# Patient Record
Sex: FEMALE | Race: WHITE | NOT HISPANIC OR LATINO | Employment: OTHER | URBAN - NONMETROPOLITAN AREA
[De-identification: names, ages, dates, MRNs, and addresses within clinical notes are randomized per-mention and may not be internally consistent; named-entity substitution may affect disease eponyms.]

---

## 2017-01-03 ENCOUNTER — HOSPITAL ENCOUNTER (OUTPATIENT)
Dept: ULTRASOUND IMAGING | Facility: HOSPITAL | Age: 82
Discharge: HOME | End: 2017-01-03
Attending: PODIATRIST | Admitting: PODIATRIST

## 2017-01-03 DIAGNOSIS — I73.9 PVD (PERIPHERAL VASCULAR DISEASE) (HCC): ICD-10-CM

## 2017-01-03 PROCEDURE — 93923 UPR/LXTR ART STDY 3+ LVLS: CPT

## 2017-01-03 PROCEDURE — 93924 LWR XTR VASC STDY BILAT: CPT | Performed by: SURGERY

## 2017-01-17 ENCOUNTER — HOSPITAL ENCOUNTER (OUTPATIENT)
Dept: NON INVASIVE DIAGNOSTICS | Age: 82
Discharge: HOME | End: 2017-01-17
Payer: MEDICARE

## 2017-01-17 ENCOUNTER — ANESTHESIA EVENT (OUTPATIENT)
Dept: OPERATING ROOM | Age: 82
End: 2017-01-17

## 2017-01-17 ENCOUNTER — HOSPITAL ENCOUNTER (OUTPATIENT)
Dept: PREADMISSION TESTING | Age: 82
Setting detail: OUTPATIENT SURGERY
Discharge: HOME | End: 2017-01-17

## 2017-01-17 ENCOUNTER — HOSPITAL ENCOUNTER (OUTPATIENT)
Dept: LAB | Age: 82
Discharge: HOME | End: 2017-01-17
Payer: MEDICARE

## 2017-01-17 LAB
ANION GAP SERPL CALCULATED.3IONS-SCNC: 18 MMOL/L (ref 7–19)
BUN BLDV-MCNC: 17 MG/DL (ref 8–23)
CALCIUM SERPL-MCNC: 9.5 MG/DL (ref 8.8–10.2)
CHLORIDE BLD-SCNC: 103 MMOL/L (ref 98–111)
CO2: 22 MMOL/L (ref 22–29)
CREAT SERPL-MCNC: 0.7 MG/DL (ref 0.5–0.9)
GFR NON-AFRICAN AMERICAN: >60
GLUCOSE BLD-MCNC: 91 MG/DL (ref 74–109)
POTASSIUM SERPL-SCNC: 4.3 MMOL/L (ref 3.5–5)
SODIUM BLD-SCNC: 143 MMOL/L (ref 136–145)

## 2017-01-17 PROCEDURE — 93005 ELECTROCARDIOGRAM TRACING: CPT

## 2017-01-23 ENCOUNTER — ANESTHESIA (OUTPATIENT)
Dept: OPERATING ROOM | Age: 82
End: 2017-01-23
Payer: MEDICARE

## 2017-01-23 ENCOUNTER — HOSPITAL ENCOUNTER (OUTPATIENT)
Age: 82
Setting detail: SPECIMEN
Discharge: HOME | End: 2017-01-23
Payer: MEDICARE

## 2017-01-23 ENCOUNTER — HOSPITAL ENCOUNTER (OUTPATIENT)
Age: 82
Setting detail: OUTPATIENT SURGERY
Discharge: HOME | End: 2017-01-23
Attending: PODIATRIST | Admitting: PODIATRIST

## 2017-01-23 VITALS
DIASTOLIC BLOOD PRESSURE: 86 MMHG | OXYGEN SATURATION: 95 % | RESPIRATION RATE: 18 BRPM | SYSTOLIC BLOOD PRESSURE: 176 MMHG | HEIGHT: 63 IN | HEART RATE: 76 BPM | WEIGHT: 125 LBS | BODY MASS INDEX: 22.15 KG/M2

## 2017-01-23 VITALS — OXYGEN SATURATION: 97 % | DIASTOLIC BLOOD PRESSURE: 72 MMHG | SYSTOLIC BLOOD PRESSURE: 152 MMHG

## 2017-01-23 PROCEDURE — 88305 TISSUE EXAM BY PATHOLOGIST: CPT

## 2017-01-23 PROCEDURE — 01480 ANES OPEN PX LOWER L/A/F NOS: CPT | Performed by: NURSE ANESTHETIST, CERTIFIED REGISTERED

## 2017-01-23 PROCEDURE — 28820 AMPUTATION OF TOE: CPT

## 2017-01-23 PROCEDURE — G8907 PT DOC NO EVENTS ON DISCHARG: HCPCS

## 2017-01-23 PROCEDURE — G8916 PT W IV AB GIVEN ON TIME: HCPCS

## 2017-01-23 RX ORDER — HYDRALAZINE HYDROCHLORIDE 20 MG/ML
5 INJECTION INTRAMUSCULAR; INTRAVENOUS EVERY 10 MIN PRN
Status: DISCONTINUED | OUTPATIENT
Start: 2017-01-23 | End: 2017-01-23 | Stop reason: HOSPADM

## 2017-01-23 RX ORDER — FENTANYL CITRATE 50 UG/ML
INJECTION, SOLUTION INTRAMUSCULAR; INTRAVENOUS PRN
Status: DISCONTINUED | OUTPATIENT
Start: 2017-01-23 | End: 2017-01-23 | Stop reason: SDUPTHER

## 2017-01-23 RX ORDER — OXYCODONE HYDROCHLORIDE AND ACETAMINOPHEN 5; 325 MG/1; MG/1
2 TABLET ORAL PRN
Status: DISCONTINUED | OUTPATIENT
Start: 2017-01-23 | End: 2017-01-23 | Stop reason: HOSPADM

## 2017-01-23 RX ORDER — SODIUM CHLORIDE, SODIUM LACTATE, POTASSIUM CHLORIDE, CALCIUM CHLORIDE 600; 310; 30; 20 MG/100ML; MG/100ML; MG/100ML; MG/100ML
INJECTION, SOLUTION INTRAVENOUS CONTINUOUS
Status: DISCONTINUED | OUTPATIENT
Start: 2017-01-23 | End: 2017-01-23 | Stop reason: HOSPADM

## 2017-01-23 RX ORDER — OXYCODONE HYDROCHLORIDE AND ACETAMINOPHEN 5; 325 MG/1; MG/1
1 TABLET ORAL PRN
Status: DISCONTINUED | OUTPATIENT
Start: 2017-01-23 | End: 2017-01-23 | Stop reason: HOSPADM

## 2017-01-23 RX ORDER — HYDROMORPHONE HCL 110MG/55ML
0.5 PATIENT CONTROLLED ANALGESIA SYRINGE INTRAVENOUS EVERY 5 MIN PRN
Status: DISCONTINUED | OUTPATIENT
Start: 2017-01-23 | End: 2017-01-23 | Stop reason: HOSPADM

## 2017-01-23 RX ORDER — MIDAZOLAM HYDROCHLORIDE 1 MG/ML
INJECTION INTRAMUSCULAR; INTRAVENOUS PRN
Status: DISCONTINUED | OUTPATIENT
Start: 2017-01-23 | End: 2017-01-23 | Stop reason: SDUPTHER

## 2017-01-23 RX ORDER — HYDROCODONE BITARTRATE AND ACETAMINOPHEN 7.5; 325 MG/1; MG/1
1 TABLET ORAL EVERY 4 HOURS PRN
Qty: 40 TABLET | Refills: 0 | Status: SHIPPED | OUTPATIENT
Start: 2017-01-23 | End: 2017-01-30

## 2017-01-23 RX ORDER — DIPHENHYDRAMINE HYDROCHLORIDE 50 MG/ML
12.5 INJECTION INTRAMUSCULAR; INTRAVENOUS
Status: DISCONTINUED | OUTPATIENT
Start: 2017-01-23 | End: 2017-01-23 | Stop reason: HOSPADM

## 2017-01-23 RX ORDER — PROPOFOL 10 MG/ML
INJECTION, EMULSION INTRAVENOUS PRN
Status: DISCONTINUED | OUTPATIENT
Start: 2017-01-23 | End: 2017-01-23 | Stop reason: SDUPTHER

## 2017-01-23 RX ORDER — LABETALOL HYDROCHLORIDE 5 MG/ML
5 INJECTION, SOLUTION INTRAVENOUS EVERY 10 MIN PRN
Status: DISCONTINUED | OUTPATIENT
Start: 2017-01-23 | End: 2017-01-23 | Stop reason: HOSPADM

## 2017-01-23 RX ORDER — CEFAZOLIN SODIUM 1 G/3ML
INJECTION, POWDER, FOR SOLUTION INTRAMUSCULAR; INTRAVENOUS PRN
Status: DISCONTINUED | OUTPATIENT
Start: 2017-01-23 | End: 2017-01-23 | Stop reason: SDUPTHER

## 2017-01-23 RX ORDER — HYDROMORPHONE HCL 110MG/55ML
0.25 PATIENT CONTROLLED ANALGESIA SYRINGE INTRAVENOUS EVERY 5 MIN PRN
Status: DISCONTINUED | OUTPATIENT
Start: 2017-01-23 | End: 2017-01-23 | Stop reason: HOSPADM

## 2017-01-23 RX ORDER — FENTANYL CITRATE 50 UG/ML
50 INJECTION, SOLUTION INTRAMUSCULAR; INTRAVENOUS EVERY 5 MIN PRN
Status: DISCONTINUED | OUTPATIENT
Start: 2017-01-23 | End: 2017-01-23 | Stop reason: HOSPADM

## 2017-01-23 RX ORDER — PROMETHAZINE HYDROCHLORIDE 25 MG/ML
12.5 INJECTION, SOLUTION INTRAMUSCULAR; INTRAVENOUS
Status: DISCONTINUED | OUTPATIENT
Start: 2017-01-23 | End: 2017-01-23 | Stop reason: HOSPADM

## 2017-01-23 RX ORDER — MEPERIDINE HYDROCHLORIDE 25 MG/ML
12.5 INJECTION INTRAMUSCULAR; INTRAVENOUS; SUBCUTANEOUS EVERY 5 MIN PRN
Status: DISCONTINUED | OUTPATIENT
Start: 2017-01-23 | End: 2017-01-23 | Stop reason: HOSPADM

## 2017-01-23 RX ORDER — ONDANSETRON 2 MG/ML
4 INJECTION INTRAMUSCULAR; INTRAVENOUS
Status: DISCONTINUED | OUTPATIENT
Start: 2017-01-23 | End: 2017-01-23 | Stop reason: HOSPADM

## 2017-01-23 RX ADMIN — PROPOFOL 30 MG: 10 INJECTION, EMULSION INTRAVENOUS at 10:15

## 2017-01-23 RX ADMIN — CEFAZOLIN SODIUM 1000 MG: 1 INJECTION, POWDER, FOR SOLUTION INTRAMUSCULAR; INTRAVENOUS at 10:02

## 2017-01-23 RX ADMIN — FENTANYL CITRATE 50 MCG: 50 INJECTION, SOLUTION INTRAMUSCULAR; INTRAVENOUS at 10:03

## 2017-01-23 RX ADMIN — MIDAZOLAM HYDROCHLORIDE 1 MG: 1 INJECTION INTRAMUSCULAR; INTRAVENOUS at 10:03

## 2017-01-23 RX ADMIN — PROPOFOL 30 MG: 10 INJECTION, EMULSION INTRAVENOUS at 10:05

## 2017-01-23 RX ADMIN — SODIUM CHLORIDE, SODIUM LACTATE, POTASSIUM CHLORIDE, CALCIUM CHLORIDE: 600; 310; 30; 20 INJECTION, SOLUTION INTRAVENOUS at 09:19

## 2017-03-06 ENCOUNTER — TRANSCRIBE ORDERS (OUTPATIENT)
Dept: LAB | Facility: HOSPITAL | Age: 82
End: 2017-03-06

## 2017-03-06 ENCOUNTER — HOSPITAL ENCOUNTER (OUTPATIENT)
Dept: ULTRASOUND IMAGING | Facility: HOSPITAL | Age: 82
Discharge: HOME | End: 2017-03-06
Attending: PODIATRIST | Admitting: PODIATRIST

## 2017-03-06 DIAGNOSIS — R60.0 LOCALIZED EDEMA: Primary | ICD-10-CM

## 2017-03-06 DIAGNOSIS — R60.0 LOCALIZED EDEMA: ICD-10-CM

## 2017-03-06 PROCEDURE — 93971 EXTREMITY STUDY: CPT

## 2017-04-30 ENCOUNTER — APPOINTMENT (OUTPATIENT)
Dept: MRI IMAGING | Age: 82
DRG: 065 | End: 2017-04-30
Payer: MEDICARE

## 2017-04-30 ENCOUNTER — APPOINTMENT (OUTPATIENT)
Dept: CT IMAGING | Age: 82
DRG: 065 | End: 2017-04-30
Payer: MEDICARE

## 2017-04-30 ENCOUNTER — HOSPITAL ENCOUNTER (INPATIENT)
Age: 82
LOS: 1 days | Discharge: HOME HEALTH | DRG: 065 | End: 2017-05-01
Attending: EMERGENCY MEDICINE | Admitting: HOSPITALIST
Payer: MEDICARE

## 2017-04-30 DIAGNOSIS — I63.9 CEREBROVASCULAR ACCIDENT (CVA), UNSPECIFIED MECHANISM (HCC): Primary | ICD-10-CM

## 2017-04-30 PROBLEM — F02.80 EARLY ONSET ALZHEIMER'S DEMENTIA (HCC): Status: ACTIVE | Noted: 2017-04-30

## 2017-04-30 PROBLEM — G30.0 EARLY ONSET ALZHEIMER'S DEMENTIA (HCC): Status: ACTIVE | Noted: 2017-04-30

## 2017-04-30 LAB
ANION GAP SERPL CALCULATED.3IONS-SCNC: 12 MMOL/L (ref 7–19)
BUN BLDV-MCNC: 9 MG/DL (ref 8–23)
CALCIUM SERPL-MCNC: 9.3 MG/DL (ref 8.8–10.2)
CHLORIDE BLD-SCNC: 104 MMOL/L (ref 98–111)
CO2: 24 MMOL/L (ref 22–29)
CREAT SERPL-MCNC: 0.7 MG/DL (ref 0.5–0.9)
GFR NON-AFRICAN AMERICAN: >60
GLUCOSE BLD-MCNC: 99 MG/DL (ref 74–109)
HCT VFR BLD CALC: 43.9 % (ref 37–47)
HEMOGLOBIN: 14.5 G/DL (ref 12–16)
INR BLD: 0.92 (ref 0.88–1.18)
LV EF: 63 %
LVEF MODALITY: NORMAL
MCH RBC QN AUTO: 29.2 PG (ref 27–31)
MCHC RBC AUTO-ENTMCNC: 33 G/DL (ref 33–37)
MCV RBC AUTO: 88.3 FL (ref 81–99)
PDW BLD-RTO: 12.9 % (ref 11.5–14.5)
PERFORMED ON: NORMAL
PLATELET # BLD: 190 K/UL (ref 130–400)
PMV BLD AUTO: 8.8 FL (ref 7.4–10.4)
POC TROPONIN I: 0 NG/ML (ref 0–0.08)
POTASSIUM SERPL-SCNC: 3.8 MMOL/L (ref 3.5–5)
PROTHROMBIN TIME: 12.4 SEC (ref 12–14.6)
RBC # BLD: 4.97 M/UL (ref 4.2–5.4)
SODIUM BLD-SCNC: 140 MMOL/L (ref 136–145)
WBC # BLD: 5.4 K/UL (ref 4.8–10.8)

## 2017-04-30 PROCEDURE — 6370000000 HC RX 637 (ALT 250 FOR IP): Performed by: PSYCHIATRY & NEUROLOGY

## 2017-04-30 PROCEDURE — 85027 COMPLETE CBC AUTOMATED: CPT

## 2017-04-30 PROCEDURE — 70551 MRI BRAIN STEM W/O DYE: CPT

## 2017-04-30 PROCEDURE — 93880 EXTRACRANIAL BILAT STUDY: CPT

## 2017-04-30 PROCEDURE — 99223 1ST HOSP IP/OBS HIGH 75: CPT | Performed by: PSYCHIATRY & NEUROLOGY

## 2017-04-30 PROCEDURE — 99285 EMERGENCY DEPT VISIT HI MDM: CPT

## 2017-04-30 PROCEDURE — 36415 COLL VENOUS BLD VENIPUNCTURE: CPT

## 2017-04-30 PROCEDURE — 84484 ASSAY OF TROPONIN QUANT: CPT

## 2017-04-30 PROCEDURE — 1210000000 HC MED SURG R&B

## 2017-04-30 PROCEDURE — 6360000002 HC RX W HCPCS: Performed by: HOSPITALIST

## 2017-04-30 PROCEDURE — 80048 BASIC METABOLIC PNL TOTAL CA: CPT

## 2017-04-30 PROCEDURE — 99223 1ST HOSP IP/OBS HIGH 75: CPT | Performed by: HOSPITALIST

## 2017-04-30 PROCEDURE — 85610 PROTHROMBIN TIME: CPT

## 2017-04-30 PROCEDURE — 70450 CT HEAD/BRAIN W/O DYE: CPT

## 2017-04-30 PROCEDURE — 93005 ELECTROCARDIOGRAM TRACING: CPT

## 2017-04-30 PROCEDURE — 6370000000 HC RX 637 (ALT 250 FOR IP): Performed by: HOSPITALIST

## 2017-04-30 PROCEDURE — 99285 EMERGENCY DEPT VISIT HI MDM: CPT | Performed by: EMERGENCY MEDICINE

## 2017-04-30 PROCEDURE — 93306 TTE W/DOPPLER COMPLETE: CPT

## 2017-04-30 RX ORDER — ONDANSETRON 2 MG/ML
4 INJECTION INTRAMUSCULAR; INTRAVENOUS EVERY 6 HOURS PRN
Status: DISCONTINUED | OUTPATIENT
Start: 2017-04-30 | End: 2017-05-01 | Stop reason: HOSPADM

## 2017-04-30 RX ORDER — ACETAMINOPHEN 325 MG/1
650 TABLET ORAL EVERY 4 HOURS PRN
Status: DISCONTINUED | OUTPATIENT
Start: 2017-04-30 | End: 2017-05-01 | Stop reason: HOSPADM

## 2017-04-30 RX ORDER — SODIUM CHLORIDE 0.9 % (FLUSH) 0.9 %
10 SYRINGE (ML) INJECTION PRN
Status: DISCONTINUED | OUTPATIENT
Start: 2017-04-30 | End: 2017-05-01 | Stop reason: HOSPADM

## 2017-04-30 RX ORDER — ASPIRIN 81 MG/1
81 TABLET ORAL DAILY
Status: DISCONTINUED | OUTPATIENT
Start: 2017-04-30 | End: 2017-05-01 | Stop reason: HOSPADM

## 2017-04-30 RX ORDER — DONEPEZIL HYDROCHLORIDE 5 MG/1
10 TABLET, FILM COATED ORAL NIGHTLY
Status: DISCONTINUED | OUTPATIENT
Start: 2017-04-30 | End: 2017-05-01 | Stop reason: HOSPADM

## 2017-04-30 RX ORDER — SODIUM CHLORIDE 0.9 % (FLUSH) 0.9 %
10 SYRINGE (ML) INJECTION EVERY 12 HOURS SCHEDULED
Status: DISCONTINUED | OUTPATIENT
Start: 2017-04-30 | End: 2017-05-01 | Stop reason: HOSPADM

## 2017-04-30 RX ORDER — HYDROCHLOROTHIAZIDE 25 MG/1
25 TABLET ORAL DAILY
Status: DISCONTINUED | OUTPATIENT
Start: 2017-04-30 | End: 2017-05-01 | Stop reason: HOSPADM

## 2017-04-30 RX ORDER — ERGOCALCIFEROL 1.25 MG/1
50000 CAPSULE ORAL
Status: DISCONTINUED | OUTPATIENT
Start: 2017-04-30 | End: 2017-05-01 | Stop reason: HOSPADM

## 2017-04-30 RX ORDER — ATORVASTATIN CALCIUM 10 MG/1
10 TABLET, FILM COATED ORAL DAILY
Status: DISCONTINUED | OUTPATIENT
Start: 2017-04-30 | End: 2017-05-01 | Stop reason: HOSPADM

## 2017-04-30 RX ORDER — CHOLECALCIFEROL (VITAMIN D3) 125 MCG
500 CAPSULE ORAL DAILY
Status: DISCONTINUED | OUTPATIENT
Start: 2017-04-30 | End: 2017-05-01 | Stop reason: HOSPADM

## 2017-04-30 RX ORDER — POTASSIUM CHLORIDE 20 MEQ/1
20 TABLET, EXTENDED RELEASE ORAL DAILY
Status: DISCONTINUED | OUTPATIENT
Start: 2017-04-30 | End: 2017-05-01 | Stop reason: HOSPADM

## 2017-04-30 RX ADMIN — HYDROCHLOROTHIAZIDE 25 MG: 25 TABLET ORAL at 16:36

## 2017-04-30 RX ADMIN — CYANOCOBALAMIN TAB 500 MCG 500 MCG: 500 TAB at 16:36

## 2017-04-30 RX ADMIN — ASPIRIN 81 MG: 81 TABLET, COATED ORAL at 16:36

## 2017-04-30 RX ADMIN — POTASSIUM CHLORIDE 20 MEQ: 20 TABLET, EXTENDED RELEASE ORAL at 16:35

## 2017-04-30 RX ADMIN — ENOXAPARIN SODIUM 40 MG: 40 INJECTION SUBCUTANEOUS at 16:36

## 2017-04-30 ASSESSMENT — ENCOUNTER SYMPTOMS
BLOOD IN STOOL: 0
NAUSEA: 0
BLURRED VISION: 1
EYES NEGATIVE: 1
HEMOPTYSIS: 0
VOMITING: 0
COUGH: 0
BACK PAIN: 0
EYE PAIN: 0
DOUBLE VISION: 0
SHORTNESS OF BREATH: 0
ABDOMINAL PAIN: 0
RESPIRATORY NEGATIVE: 1
DIARRHEA: 0
SPUTUM PRODUCTION: 0
CONSTIPATION: 0
GASTROINTESTINAL NEGATIVE: 1

## 2017-05-01 VITALS
WEIGHT: 125 LBS | OXYGEN SATURATION: 98 % | HEART RATE: 76 BPM | RESPIRATION RATE: 18 BRPM | BODY MASS INDEX: 22.15 KG/M2 | DIASTOLIC BLOOD PRESSURE: 75 MMHG | HEIGHT: 63 IN | TEMPERATURE: 97.8 F | SYSTOLIC BLOOD PRESSURE: 148 MMHG

## 2017-05-01 PROCEDURE — 99239 HOSP IP/OBS DSCHRG MGMT >30: CPT | Performed by: HOSPITALIST

## 2017-05-01 PROCEDURE — G8978 MOBILITY CURRENT STATUS: HCPCS

## 2017-05-01 PROCEDURE — G8987 SELF CARE CURRENT STATUS: HCPCS

## 2017-05-01 PROCEDURE — G8998 SWALLOW D/C STATUS: HCPCS

## 2017-05-01 PROCEDURE — 97162 PT EVAL MOD COMPLEX 30 MIN: CPT

## 2017-05-01 PROCEDURE — G8979 MOBILITY GOAL STATUS: HCPCS

## 2017-05-01 PROCEDURE — 96116 NUBHVL XM PHYS/QHP 1ST HR: CPT

## 2017-05-01 PROCEDURE — 92610 EVALUATE SWALLOWING FUNCTION: CPT

## 2017-05-01 PROCEDURE — 99233 SBSQ HOSP IP/OBS HIGH 50: CPT | Performed by: PSYCHIATRY & NEUROLOGY

## 2017-05-01 PROCEDURE — G8988 SELF CARE GOAL STATUS: HCPCS

## 2017-05-01 PROCEDURE — G8997 SWALLOW GOAL STATUS: HCPCS

## 2017-05-01 PROCEDURE — 97166 OT EVAL MOD COMPLEX 45 MIN: CPT

## 2017-05-01 PROCEDURE — G8996 SWALLOW CURRENT STATUS: HCPCS

## 2017-05-01 RX ORDER — CLOPIDOGREL BISULFATE 75 MG/1
75 TABLET ORAL DAILY
Qty: 30 TABLET | Refills: 3 | Status: SHIPPED | OUTPATIENT
Start: 2017-05-01 | End: 2017-08-11 | Stop reason: SDUPTHER

## 2017-05-01 RX ORDER — CLOPIDOGREL BISULFATE 75 MG/1
75 TABLET ORAL DAILY
Qty: 30 TABLET | Refills: 3 | Status: SHIPPED | OUTPATIENT
Start: 2017-05-01 | End: 2017-05-01

## 2017-05-01 RX ORDER — CLOPIDOGREL BISULFATE 75 MG/1
75 TABLET ORAL DAILY
Status: DISCONTINUED | OUTPATIENT
Start: 2017-05-01 | End: 2017-05-01 | Stop reason: HOSPADM

## 2017-05-02 LAB
EKG P AXIS: 33 DEGREES
EKG P-R INTERVAL: 158 MS
EKG Q-T INTERVAL: 382 MS
EKG QRS DURATION: 74 MS
EKG QTC CALCULATION (BAZETT): 401 MS
EKG T AXIS: 18 DEGREES

## 2017-06-22 ENCOUNTER — TELEPHONE (OUTPATIENT)
Dept: INTERNAL MEDICINE | Age: 82
End: 2017-06-22

## 2017-06-23 RX ORDER — ASPIRIN 81 MG/1
81 TABLET ORAL DAILY
COMMUNITY
End: 2019-07-18 | Stop reason: CLARIF

## 2017-07-14 ENCOUNTER — TELEPHONE (OUTPATIENT)
Dept: INTERNAL MEDICINE | Age: 82
End: 2017-07-14

## 2017-07-14 DIAGNOSIS — E55.9 VITAMIN D DEFICIENCY: Primary | ICD-10-CM

## 2017-07-14 DIAGNOSIS — I87.2 VENOUS INSUFFICIENCY: ICD-10-CM

## 2017-07-14 DIAGNOSIS — E78.01 FAMILIAL HYPERCHOLESTEROLEMIA: ICD-10-CM

## 2017-07-21 RX ORDER — HYDROCHLOROTHIAZIDE 25 MG/1
25 TABLET ORAL DAILY
Qty: 90 TABLET | Refills: 3 | Status: SHIPPED | OUTPATIENT
Start: 2017-07-21 | End: 2018-07-06 | Stop reason: SDUPTHER

## 2017-07-22 ENCOUNTER — HOSPITAL ENCOUNTER (EMERGENCY)
Age: 82
Discharge: HOME | End: 2017-07-22
Attending: EMERGENCY MEDICINE
Payer: MEDICARE

## 2017-07-22 VITALS
RESPIRATION RATE: 19 BRPM | OXYGEN SATURATION: 95 % | HEART RATE: 76 BPM | WEIGHT: 117 LBS | BODY MASS INDEX: 19.97 KG/M2 | DIASTOLIC BLOOD PRESSURE: 75 MMHG | HEIGHT: 64 IN | SYSTOLIC BLOOD PRESSURE: 142 MMHG | TEMPERATURE: 98.4 F

## 2017-07-22 DIAGNOSIS — I15.9 SECONDARY HYPERTENSION: Primary | ICD-10-CM

## 2017-07-22 LAB
ALBUMIN SERPL-MCNC: 3.8 G/DL (ref 3.5–5.2)
ALP BLD-CCNC: 76 U/L (ref 35–104)
ALT SERPL-CCNC: 18 U/L (ref 5–33)
ANION GAP SERPL CALCULATED.3IONS-SCNC: 13 MMOL/L (ref 7–19)
AST SERPL-CCNC: 20 U/L (ref 5–32)
BILIRUB SERPL-MCNC: 0.6 MG/DL (ref 0.2–1.2)
BUN BLDV-MCNC: 13 MG/DL (ref 8–23)
CALCIUM SERPL-MCNC: 9.5 MG/DL (ref 8.8–10.2)
CHLORIDE BLD-SCNC: 100 MMOL/L (ref 98–111)
CO2: 25 MMOL/L (ref 22–29)
CREAT SERPL-MCNC: 0.7 MG/DL (ref 0.5–0.9)
GFR NON-AFRICAN AMERICAN: >60
GLUCOSE BLD-MCNC: 111 MG/DL (ref 74–109)
HCT VFR BLD CALC: 41.3 % (ref 37–47)
HEMOGLOBIN: 14 G/DL (ref 12–16)
MCH RBC QN AUTO: 29.4 PG (ref 27–31)
MCHC RBC AUTO-ENTMCNC: 33.9 G/DL (ref 33–37)
MCV RBC AUTO: 86.8 FL (ref 81–99)
PDW BLD-RTO: 13.2 % (ref 11.5–14.5)
PLATELET # BLD: 235 K/UL (ref 130–400)
PMV BLD AUTO: 8.9 FL (ref 9.4–12.3)
POTASSIUM SERPL-SCNC: 3.7 MMOL/L (ref 3.5–5)
RBC # BLD: 4.76 M/UL (ref 4.2–5.4)
SODIUM BLD-SCNC: 138 MMOL/L (ref 136–145)
TOTAL PROTEIN: 6.9 G/DL (ref 6.6–8.7)
WBC # BLD: 7.2 K/UL (ref 4.8–10.8)

## 2017-07-22 PROCEDURE — 80053 COMPREHEN METABOLIC PANEL: CPT

## 2017-07-22 PROCEDURE — 36415 COLL VENOUS BLD VENIPUNCTURE: CPT

## 2017-07-22 PROCEDURE — 99282 EMERGENCY DEPT VISIT SF MDM: CPT | Performed by: EMERGENCY MEDICINE

## 2017-07-22 PROCEDURE — 85027 COMPLETE CBC AUTOMATED: CPT

## 2017-07-22 PROCEDURE — 96374 THER/PROPH/DIAG INJ IV PUSH: CPT

## 2017-07-22 PROCEDURE — 2500000003 HC RX 250 WO HCPCS: Performed by: EMERGENCY MEDICINE

## 2017-07-22 PROCEDURE — 99283 EMERGENCY DEPT VISIT LOW MDM: CPT

## 2017-07-22 RX ORDER — LABETALOL HYDROCHLORIDE 5 MG/ML
20 INJECTION, SOLUTION INTRAVENOUS ONCE
Status: COMPLETED | OUTPATIENT
Start: 2017-07-22 | End: 2017-07-22

## 2017-07-22 RX ADMIN — LABETALOL HYDROCHLORIDE 20 MG: 5 INJECTION, SOLUTION INTRAVENOUS at 19:12

## 2017-07-22 ASSESSMENT — ENCOUNTER SYMPTOMS
COUGH: 0
EYE PAIN: 0
STRIDOR: 0
APNEA: 0
EYES NEGATIVE: 1
EYE DISCHARGE: 0
CHOKING: 0
ALLERGIC/IMMUNOLOGIC NEGATIVE: 1
EYE ITCHING: 0
RESPIRATORY NEGATIVE: 1

## 2017-07-24 ENCOUNTER — TELEPHONE (OUTPATIENT)
Dept: INTERNAL MEDICINE | Age: 82
End: 2017-07-24

## 2017-07-24 RX ORDER — ALENDRONATE SODIUM 70 MG/1
TABLET ORAL
Qty: 12 TABLET | Refills: 3 | Status: SHIPPED | OUTPATIENT
Start: 2017-07-24 | End: 2019-01-11 | Stop reason: SDUPTHER

## 2017-07-25 ENCOUNTER — HOSPITAL ENCOUNTER (EMERGENCY)
Age: 82
Discharge: HOME | End: 2017-07-25
Attending: EMERGENCY MEDICINE
Payer: MEDICARE

## 2017-07-25 VITALS
HEART RATE: 78 BPM | DIASTOLIC BLOOD PRESSURE: 86 MMHG | HEIGHT: 64 IN | BODY MASS INDEX: 19.97 KG/M2 | OXYGEN SATURATION: 99 % | RESPIRATION RATE: 20 BRPM | SYSTOLIC BLOOD PRESSURE: 154 MMHG | TEMPERATURE: 98 F | WEIGHT: 117 LBS

## 2017-07-25 DIAGNOSIS — I10 ASYMPTOMATIC HYPERTENSION: Primary | ICD-10-CM

## 2017-07-25 LAB
ALBUMIN SERPL-MCNC: 3.8 G/DL (ref 3.5–5.2)
ALP BLD-CCNC: 75 U/L (ref 35–104)
ALT SERPL-CCNC: 17 U/L (ref 5–33)
ANION GAP SERPL CALCULATED.3IONS-SCNC: 11 MMOL/L (ref 7–19)
AST SERPL-CCNC: 20 U/L (ref 5–32)
BILIRUB SERPL-MCNC: 0.5 MG/DL (ref 0.2–1.2)
BUN BLDV-MCNC: 17 MG/DL (ref 8–23)
CALCIUM SERPL-MCNC: 9.6 MG/DL (ref 8.8–10.2)
CHLORIDE BLD-SCNC: 102 MMOL/L (ref 98–111)
CO2: 26 MMOL/L (ref 22–29)
CREAT SERPL-MCNC: 0.7 MG/DL (ref 0.5–0.9)
GFR NON-AFRICAN AMERICAN: >60
GLUCOSE BLD-MCNC: 110 MG/DL (ref 74–109)
HCT VFR BLD CALC: 40.8 % (ref 37–47)
HEMOGLOBIN: 13.4 G/DL (ref 12–16)
MCH RBC QN AUTO: 29.3 PG (ref 27–31)
MCHC RBC AUTO-ENTMCNC: 32.8 G/DL (ref 33–37)
MCV RBC AUTO: 89.3 FL (ref 81–99)
PDW BLD-RTO: 13.3 % (ref 11.5–14.5)
PERFORMED ON: NORMAL
PLATELET # BLD: 226 K/UL (ref 130–400)
PMV BLD AUTO: 9 FL (ref 9.4–12.3)
POC TROPONIN I: 0 NG/ML (ref 0–0.08)
POTASSIUM SERPL-SCNC: 3.6 MMOL/L (ref 3.5–5)
RBC # BLD: 4.57 M/UL (ref 4.2–5.4)
SODIUM BLD-SCNC: 139 MMOL/L (ref 136–145)
TOTAL PROTEIN: 6.7 G/DL (ref 6.6–8.7)
WBC # BLD: 6.2 K/UL (ref 4.8–10.8)

## 2017-07-25 PROCEDURE — 36415 COLL VENOUS BLD VENIPUNCTURE: CPT

## 2017-07-25 PROCEDURE — 93005 ELECTROCARDIOGRAM TRACING: CPT

## 2017-07-25 PROCEDURE — 85027 COMPLETE CBC AUTOMATED: CPT

## 2017-07-25 PROCEDURE — 6370000000 HC RX 637 (ALT 250 FOR IP): Performed by: EMERGENCY MEDICINE

## 2017-07-25 PROCEDURE — 99283 EMERGENCY DEPT VISIT LOW MDM: CPT

## 2017-07-25 PROCEDURE — 99283 EMERGENCY DEPT VISIT LOW MDM: CPT | Performed by: EMERGENCY MEDICINE

## 2017-07-25 PROCEDURE — 80053 COMPREHEN METABOLIC PANEL: CPT

## 2017-07-25 PROCEDURE — 84484 ASSAY OF TROPONIN QUANT: CPT

## 2017-07-25 RX ORDER — HYDROCHLOROTHIAZIDE 25 MG/1
25 TABLET ORAL ONCE
Status: COMPLETED | OUTPATIENT
Start: 2017-07-25 | End: 2017-07-25

## 2017-07-25 RX ORDER — HYDROCHLOROTHIAZIDE 25 MG/1
25 TABLET ORAL DAILY
Qty: 15 TABLET | Refills: 1 | Status: SHIPPED | OUTPATIENT
Start: 2017-07-25 | End: 2017-08-11 | Stop reason: SDUPTHER

## 2017-07-25 RX ADMIN — HYDROCHLOROTHIAZIDE 25 MG: 25 TABLET ORAL at 22:08

## 2017-07-25 ASSESSMENT — ENCOUNTER SYMPTOMS
COUGH: 0
SORE THROAT: 0
CHEST TIGHTNESS: 0
BACK PAIN: 0
NAUSEA: 0
RHINORRHEA: 0
ABDOMINAL PAIN: 0
PHOTOPHOBIA: 0
VOMITING: 0
EYE PAIN: 0
DIARRHEA: 0
SHORTNESS OF BREATH: 0

## 2017-07-26 ENCOUNTER — TELEPHONE (OUTPATIENT)
Dept: INTERNAL MEDICINE | Age: 82
End: 2017-07-26

## 2017-07-26 ENCOUNTER — TRANSCRIBE ORDERS (OUTPATIENT)
Dept: ADMINISTRATIVE | Facility: HOSPITAL | Age: 82
End: 2017-07-26

## 2017-07-26 DIAGNOSIS — Z12.31 ENCOUNTER FOR SCREENING MAMMOGRAM FOR MALIGNANT NEOPLASM OF BREAST: Primary | ICD-10-CM

## 2017-07-26 LAB
EKG P AXIS: 51 DEGREES
EKG P-R INTERVAL: 156 MS
EKG Q-T INTERVAL: 374 MS
EKG QRS DURATION: 72 MS
EKG QTC CALCULATION (BAZETT): 407 MS
EKG T AXIS: 21 DEGREES

## 2017-07-27 DIAGNOSIS — Z12.39 SCREENING FOR BREAST CANCER: Primary | ICD-10-CM

## 2017-07-28 ENCOUNTER — HOSPITAL ENCOUNTER (OUTPATIENT)
Dept: MAMMOGRAPHY | Facility: HOSPITAL | Age: 82
Discharge: HOME | End: 2017-07-28
Attending: INTERNAL MEDICINE | Admitting: INTERNAL MEDICINE

## 2017-07-28 DIAGNOSIS — Z12.31 ENCOUNTER FOR SCREENING MAMMOGRAM FOR MALIGNANT NEOPLASM OF BREAST: ICD-10-CM

## 2017-07-28 PROCEDURE — 77063 BREAST TOMOSYNTHESIS BI: CPT

## 2017-07-28 PROCEDURE — G0202 SCR MAMMO BI INCL CAD: HCPCS

## 2017-08-02 RX ORDER — METOPROLOL SUCCINATE 25 MG/1
25 TABLET, EXTENDED RELEASE ORAL DAILY
Qty: 90 TABLET | Refills: 1 | Status: SHIPPED | OUTPATIENT
Start: 2017-08-02 | End: 2018-01-10 | Stop reason: SDUPTHER

## 2017-08-08 ENCOUNTER — TELEPHONE (OUTPATIENT)
Dept: INTERNAL MEDICINE | Age: 82
End: 2017-08-08

## 2017-08-10 PROBLEM — M17.10 PRIMARY OSTEOARTHRITIS OF KNEE: Chronic | Status: ACTIVE | Noted: 2017-08-10

## 2017-08-10 PROBLEM — M51.9 LUMBAR DISC DISEASE: Chronic | Status: ACTIVE | Noted: 2017-08-10

## 2017-08-10 PROBLEM — M72.0 DUPUYTREN'S CONTRACTURE OF BOTH HANDS: Chronic | Status: ACTIVE | Noted: 2017-08-10

## 2017-08-10 PROBLEM — M81.0 AGE-RELATED OSTEOPOROSIS WITHOUT CURRENT PATHOLOGICAL FRACTURE: Chronic | Status: ACTIVE | Noted: 2017-08-10

## 2017-08-10 PROBLEM — K52.831 COLLAGENOUS COLITIS: Chronic | Status: ACTIVE | Noted: 2017-08-10

## 2017-08-10 RX ORDER — POTASSIUM CHLORIDE 20 MEQ/1
20 TABLET, EXTENDED RELEASE ORAL 2 TIMES DAILY
COMMUNITY
End: 2017-12-20 | Stop reason: ALTCHOICE

## 2017-08-11 ENCOUNTER — OFFICE VISIT (OUTPATIENT)
Dept: INTERNAL MEDICINE | Age: 82
End: 2017-08-11
Payer: MEDICARE

## 2017-08-11 VITALS
DIASTOLIC BLOOD PRESSURE: 70 MMHG | OXYGEN SATURATION: 97 % | SYSTOLIC BLOOD PRESSURE: 168 MMHG | RESPIRATION RATE: 22 BRPM | BODY MASS INDEX: 20.82 KG/M2 | HEIGHT: 63 IN | WEIGHT: 117.5 LBS | HEART RATE: 73 BPM

## 2017-08-11 DIAGNOSIS — F02.80 EARLY ONSET ALZHEIMER'S DEMENTIA WITHOUT BEHAVIORAL DISTURBANCE (HCC): ICD-10-CM

## 2017-08-11 DIAGNOSIS — G30.0 EARLY ONSET ALZHEIMER'S DEMENTIA WITHOUT BEHAVIORAL DISTURBANCE (HCC): ICD-10-CM

## 2017-08-11 DIAGNOSIS — E87.5 HYPERKALEMIA: Primary | ICD-10-CM

## 2017-08-11 DIAGNOSIS — I63.512 CEREBROVASCULAR ACCIDENT (CVA) DUE TO OCCLUSION OF LEFT MIDDLE CEREBRAL ARTERY (HCC): Primary | ICD-10-CM

## 2017-08-11 DIAGNOSIS — M81.0 AGE-RELATED OSTEOPOROSIS WITHOUT CURRENT PATHOLOGICAL FRACTURE: Chronic | ICD-10-CM

## 2017-08-11 DIAGNOSIS — I10 ESSENTIAL HYPERTENSION: ICD-10-CM

## 2017-08-11 DIAGNOSIS — M51.9 LUMBAR DISC DISEASE: Chronic | ICD-10-CM

## 2017-08-11 DIAGNOSIS — E78.2 MIXED HYPERLIPIDEMIA: ICD-10-CM

## 2017-08-11 PROCEDURE — G8427 DOCREV CUR MEDS BY ELIG CLIN: HCPCS | Performed by: INTERNAL MEDICINE

## 2017-08-11 PROCEDURE — G0439 PPPS, SUBSEQ VISIT: HCPCS | Performed by: INTERNAL MEDICINE

## 2017-08-11 PROCEDURE — 1036F TOBACCO NON-USER: CPT | Performed by: INTERNAL MEDICINE

## 2017-08-11 PROCEDURE — 4005F PHARM THX FOR OP RXD: CPT | Performed by: INTERNAL MEDICINE

## 2017-08-11 PROCEDURE — G8598 ASA/ANTIPLAT THER USED: HCPCS | Performed by: INTERNAL MEDICINE

## 2017-08-11 PROCEDURE — 4040F PNEUMOC VAC/ADMIN/RCVD: CPT | Performed by: INTERNAL MEDICINE

## 2017-08-11 PROCEDURE — 99213 OFFICE O/P EST LOW 20 MIN: CPT | Performed by: INTERNAL MEDICINE

## 2017-08-11 PROCEDURE — G8420 CALC BMI NORM PARAMETERS: HCPCS | Performed by: INTERNAL MEDICINE

## 2017-08-11 PROCEDURE — 1123F ACP DISCUSS/DSCN MKR DOCD: CPT | Performed by: INTERNAL MEDICINE

## 2017-08-11 PROCEDURE — 1090F PRES/ABSN URINE INCON ASSESS: CPT | Performed by: INTERNAL MEDICINE

## 2017-08-11 RX ORDER — CLOPIDOGREL BISULFATE 75 MG/1
75 TABLET ORAL DAILY
Qty: 90 TABLET | Refills: 3 | Status: SHIPPED | OUTPATIENT
Start: 2017-08-11 | End: 2018-09-04 | Stop reason: SDUPTHER

## 2017-08-11 ASSESSMENT — ANXIETY QUESTIONNAIRES: GAD7 TOTAL SCORE: 0

## 2017-08-11 ASSESSMENT — ENCOUNTER SYMPTOMS
SORE THROAT: 0
VOICE CHANGE: 0
VOMITING: 0
TROUBLE SWALLOWING: 0
BACK PAIN: 0
CONSTIPATION: 0
WHEEZING: 0
EYE PAIN: 0
ABDOMINAL PAIN: 0
DIARRHEA: 0
ANAL BLEEDING: 0
NAUSEA: 0
COUGH: 0
SHORTNESS OF BREATH: 0
EYE REDNESS: 0
BLOOD IN STOOL: 0

## 2017-08-11 ASSESSMENT — PATIENT HEALTH QUESTIONNAIRE - PHQ9: SUM OF ALL RESPONSES TO PHQ QUESTIONS 1-9: 0

## 2017-08-11 ASSESSMENT — LIFESTYLE VARIABLES: HOW OFTEN DO YOU HAVE A DRINK CONTAINING ALCOHOL: 0

## 2017-09-20 RX ORDER — ATORVASTATIN CALCIUM 10 MG/1
TABLET, FILM COATED ORAL
Qty: 90 TABLET | Refills: 3 | Status: SHIPPED | OUTPATIENT
Start: 2017-09-20 | End: 2017-12-20 | Stop reason: SDUPTHER

## 2017-10-13 ENCOUNTER — TELEPHONE (OUTPATIENT)
Dept: INTERNAL MEDICINE | Age: 82
End: 2017-10-13

## 2017-10-13 RX ORDER — CEPHALEXIN 500 MG/1
500 CAPSULE ORAL 3 TIMES DAILY
Qty: 21 CAPSULE | Refills: 0 | Status: SHIPPED | OUTPATIENT
Start: 2017-10-13 | End: 2017-10-20

## 2017-10-13 RX ORDER — FUROSEMIDE 20 MG/1
20 TABLET ORAL DAILY
Qty: 3 TABLET | Refills: 0 | Status: SHIPPED | OUTPATIENT
Start: 2017-10-13 | End: 2017-10-20 | Stop reason: ALTCHOICE

## 2017-10-13 NOTE — TELEPHONE ENCOUNTER
Instructions were called to pt and she verbalized understanding. 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

## 2017-10-16 ENCOUNTER — TELEPHONE (OUTPATIENT)
Dept: INTERNAL MEDICINE | Age: 82
End: 2017-10-16

## 2017-10-17 ENCOUNTER — TELEPHONE (OUTPATIENT)
Dept: INTERNAL MEDICINE | Age: 82
End: 2017-10-17

## 2017-10-17 NOTE — TELEPHONE ENCOUNTER
Pt called to discuss meds. She has been taking keflex 3x daily per Dr. Josie Jackson orders. She states that her legs and swollen and very hard. She has had double knee replacement and thinks that may be a reason her her problems. She isn't concerned at the moment, did not want to come in today (10/17) to see a mid-level provider. She will call IM back in 2 days to report on swelling levels and if she wants to be seen. I advised the pt to continue taking keflex 3 times daily. She is also on lasix.

## 2017-10-19 ENCOUNTER — TELEPHONE (OUTPATIENT)
Dept: INTERNAL MEDICINE | Age: 82
End: 2017-10-19

## 2017-10-20 ENCOUNTER — OFFICE VISIT (OUTPATIENT)
Dept: INTERNAL MEDICINE | Age: 82
End: 2017-10-20
Payer: MEDICARE

## 2017-10-20 VITALS
DIASTOLIC BLOOD PRESSURE: 74 MMHG | WEIGHT: 125.5 LBS | TEMPERATURE: 99.1 F | SYSTOLIC BLOOD PRESSURE: 130 MMHG | BODY MASS INDEX: 22.24 KG/M2 | OXYGEN SATURATION: 98 % | HEIGHT: 63 IN | HEART RATE: 78 BPM

## 2017-10-20 DIAGNOSIS — M17.0 PRIMARY OSTEOARTHRITIS OF BOTH KNEES: ICD-10-CM

## 2017-10-20 DIAGNOSIS — I87.2 VENOUS INSUFFICIENCY OF BOTH LOWER EXTREMITIES: Primary | ICD-10-CM

## 2017-10-20 DIAGNOSIS — R60.0 LOCALIZED EDEMA: ICD-10-CM

## 2017-10-20 PROCEDURE — 1090F PRES/ABSN URINE INCON ASSESS: CPT | Performed by: PHYSICIAN ASSISTANT

## 2017-10-20 PROCEDURE — 4040F PNEUMOC VAC/ADMIN/RCVD: CPT | Performed by: PHYSICIAN ASSISTANT

## 2017-10-20 PROCEDURE — G8427 DOCREV CUR MEDS BY ELIG CLIN: HCPCS | Performed by: PHYSICIAN ASSISTANT

## 2017-10-20 PROCEDURE — G8420 CALC BMI NORM PARAMETERS: HCPCS | Performed by: PHYSICIAN ASSISTANT

## 2017-10-20 PROCEDURE — G8484 FLU IMMUNIZE NO ADMIN: HCPCS | Performed by: PHYSICIAN ASSISTANT

## 2017-10-20 PROCEDURE — G8598 ASA/ANTIPLAT THER USED: HCPCS | Performed by: PHYSICIAN ASSISTANT

## 2017-10-20 PROCEDURE — 1036F TOBACCO NON-USER: CPT | Performed by: PHYSICIAN ASSISTANT

## 2017-10-20 PROCEDURE — 1123F ACP DISCUSS/DSCN MKR DOCD: CPT | Performed by: PHYSICIAN ASSISTANT

## 2017-10-20 PROCEDURE — 99213 OFFICE O/P EST LOW 20 MIN: CPT | Performed by: PHYSICIAN ASSISTANT

## 2017-10-20 RX ORDER — FUROSEMIDE 20 MG/1
20 TABLET ORAL DAILY
Qty: 5 TABLET | Refills: 0 | Status: SHIPPED | OUTPATIENT
Start: 2017-10-20 | End: 2017-12-20 | Stop reason: ALTCHOICE

## 2017-10-20 ASSESSMENT — ENCOUNTER SYMPTOMS
VOMITING: 0
CHEST TIGHTNESS: 0
NAUSEA: 0
RHINORRHEA: 0
CONSTIPATION: 0
DIARRHEA: 0
COLOR CHANGE: 0
COUGH: 0
SORE THROAT: 0
EYE PAIN: 0
PHOTOPHOBIA: 0
ABDOMINAL PAIN: 0
WHEEZING: 0
SHORTNESS OF BREATH: 0
SINUS PRESSURE: 0
EYE REDNESS: 0

## 2017-10-20 NOTE — PROGRESS NOTES
disease 8/10/2017    Memory loss     Meralgia paraesthetica     Primary osteoarthritis of knee 8/10/2017    Sensorineural hearing loss     Venous insufficiency     Vitamin D deficiency        Past Medical History:   Diagnosis Date    Age-related osteoporosis without current pathological fracture 8/10/2017    Arthritis     BPV (benign positional vertigo)     Collagenous colitis 8/10/2017    Dupuytren's contracture of both hands 6/20/3852    Ehrlichiosis     Hyperlipidemia     Hypertensive crisis     Lumbar disc disease 8/10/2017    Memory loss     Meralgia paraesthetica     Primary osteoarthritis of knee 8/10/2017    Sensorineural hearing loss     Venous insufficiency     Vitamin D deficiency        Prior to Visit Medications    Medication Sig Taking?  Authorizing Provider   furosemide (LASIX) 20 MG tablet Take 1 tablet by mouth daily Yes PAO Ni   cephALEXin (KEFLEX) 500 MG capsule Take 1 capsule by mouth 3 times daily for 7 days Yes Dora Linares MD   atorvastatin (LIPITOR) 10 MG tablet TAKE 1 TABLET EVERY DAY Yes Dora Linares MD   clopidogrel (PLAVIX) 75 MG tablet Take 1 tablet by mouth daily Yes Dora Linares MD   potassium chloride (KLOR-CON M) 20 MEQ extended release tablet Take 20 mEq by mouth 2 times daily Yes Historical Provider, MD   metoprolol succinate (TOPROL XL) 25 MG extended release tablet Take 1 tablet by mouth daily For blood pressure Yes Dora Linares MD   vitamin D (ERGOCALCIFEROL) 98492 units CAPS capsule TAKE ONE CAPSULE BY MOUTH ONCE WEEKLY Yes Dora Linares MD   alendronate (FOSAMAX) 70 MG tablet TAKE 1 TABLET EVERY WEEK Yes Dora Linares MD   hydrochlorothiazide (HYDRODIURIL) 25 MG tablet Take 1 tablet by mouth daily Yes Dora Linares MD   aspirin (ASPIR-LOW) 81 MG EC tablet Take 81 mg by mouth daily Yes Historical Provider, MD       Past Surgical History:   Procedure Laterality Date    COLONOSCOPY      KNEE SURGERY Bilateral     total knee arthroplasty 11/10    TOE AMPUTATION Right 1/23/2017    TOE AMPUTATION 2ND DIGIT performed by Inna Valdez DPM at [facility] OR       Family History   Problem Relation Age of Onset    Atrial Fibrillation Sister     Stroke Sister      ischemic    Breast Cancer Sister [de-identified]       Allergies   Allergen Reactions    Other      novocain doesn't remember side effects       Social History     Social History    Marital status: Single     Spouse name: N/A    Number of children: 0    Years of education: 15     Occupational History    retired      Social History Main Topics    Smoking status: Former Smoker    Smokeless tobacco: Never Used    Alcohol use No    Drug use: No    Sexual activity: No     Other Topics Concern    Not on file     Social History Narrative    No narrative on file       Review of Systems  Review of Systems   Constitutional: Negative for activity change, appetite change, chills, fatigue and fever. HENT: Negative for congestion, ear pain, postnasal drip, rhinorrhea, sinus pressure, sneezing and sore throat. Eyes: Negative for photophobia, pain and redness. Respiratory: Negative for cough, chest tightness, shortness of breath and wheezing. Cardiovascular: Positive for leg swelling (bilateral lower extremities below the knee). Negative for chest pain and palpitations. Gastrointestinal: Negative for abdominal pain, constipation, diarrhea, nausea and vomiting. Genitourinary: Negative for dysuria, frequency, hematuria and urgency. Musculoskeletal: Negative for arthralgias, gait problem, joint swelling and myalgias. Skin: Negative for color change, pallor and wound. Neurological: Negative for dizziness, facial asymmetry, speech difficulty, weakness and light-headedness. Hematological: Negative for adenopathy. Does not bruise/bleed easily. Psychiatric/Behavioral: Negative for confusion. The patient is not nervous/anxious.           Current Outpatient Prescriptions   Medication Sig rebound and no guarding. Musculoskeletal: Normal range of motion. She exhibits edema (patient does have some edema in bilateral lower legs. Both legs are dry and flaky, no warmth or fever within the legs. They're easy to palpate with no pitting edema. No erythema. ). She exhibits no tenderness or deformity. Lymphadenopathy:     She has no cervical adenopathy. Neurological: She is alert and oriented to person, place, and time. She has normal reflexes. No cranial nerve deficit. Skin: Skin is warm, dry and intact. No lesion and no rash noted. No erythema. Psychiatric: She has a normal mood and affect. Her behavior is normal. Judgment and thought content normal. Her mood appears not anxious. She does not exhibit a depressed mood. Nursing note and vitals reviewed. ASSESSMENT      ICD-10-CM ICD-9-CM    1. Venous insufficiency of both lower extremities I87.2 459.81    2. Localized edema R60.0 782.3 furosemide (LASIX) 20 MG tablet   3. Primary osteoarthritis of both knees M17.0 715.16        PLAN  Dr. Isabelle Caputo looked at patient's legs and we do not think any sign of infection she is just having some edema secondary to venous insufficiency. We'll start patient on Lasix 20 mg daily for 5 days and put in a prescription for RAJENDRA hose for patient to wear when she can be up and on her feet. Discussed with patient that she needs to elevate her legs as much as possible to get the edema down. Also discussed that she could  some Ace bandages and wrap her legs in the afternoon when she sitting see if we get some of the edema down. Discussed with patient low sodium diet. Patient to follow-up as needed for increased pain, swelling, fever, erythema, shortness of breath, chest pain or as needed if not improving. Return if symptoms worsen or fail to improve. All questions answered. Patient voices understanding and agrees to plans along with risks and benefits of plan.  Patient is instructed to continue prior medications, diet, and exercise plans as instructed. Patient agrees to follow up as instructions, sooner if needed, or to go to ER if condition becomes emergent. Additional Instructions: As always, patient is advised to bring in medication bottles in order to correctly reconcile with our current list.      Denise James PA-C    EMR Dragon/transcription disclaimer: Much of this encounter note is electronic transcription/translation of spoken language to printed text. The electronic translation of spoken language may be erroneous, or at times, nonsensical words or phrases may be inadvertently transcribed.  Although I have reviewed the note for such errors, some may still exist.

## 2017-11-07 ENCOUNTER — NURSE ONLY (OUTPATIENT)
Dept: INTERNAL MEDICINE | Age: 82
End: 2017-11-07
Payer: MEDICARE

## 2017-11-07 DIAGNOSIS — Z23 NEED FOR INFLUENZA VACCINATION: Primary | ICD-10-CM

## 2017-11-07 PROCEDURE — G0008 ADMIN INFLUENZA VIRUS VAC: HCPCS | Performed by: INTERNAL MEDICINE

## 2017-11-07 PROCEDURE — 90662 IIV NO PRSV INCREASED AG IM: CPT | Performed by: INTERNAL MEDICINE

## 2017-12-19 PROBLEM — R41.3 MEMORY LOSS: Chronic | Status: ACTIVE | Noted: 2017-12-19

## 2017-12-19 PROBLEM — H90.5 SENSORINEURAL HEARING LOSS: Chronic | Status: ACTIVE | Noted: 2017-12-19

## 2017-12-19 PROBLEM — I87.2 VENOUS INSUFFICIENCY: Chronic | Status: ACTIVE | Noted: 2017-12-19

## 2017-12-20 ENCOUNTER — OFFICE VISIT (OUTPATIENT)
Dept: INTERNAL MEDICINE | Age: 82
End: 2017-12-20
Payer: MEDICARE

## 2017-12-20 VITALS
OXYGEN SATURATION: 96 % | DIASTOLIC BLOOD PRESSURE: 70 MMHG | HEIGHT: 62 IN | BODY MASS INDEX: 23.65 KG/M2 | WEIGHT: 128.5 LBS | HEART RATE: 84 BPM | SYSTOLIC BLOOD PRESSURE: 170 MMHG | RESPIRATION RATE: 22 BRPM

## 2017-12-20 DIAGNOSIS — F51.01 PRIMARY INSOMNIA: ICD-10-CM

## 2017-12-20 DIAGNOSIS — J43.8 OTHER EMPHYSEMA (HCC): ICD-10-CM

## 2017-12-20 DIAGNOSIS — I10 ESSENTIAL HYPERTENSION: ICD-10-CM

## 2017-12-20 DIAGNOSIS — E78.2 MIXED HYPERLIPIDEMIA: ICD-10-CM

## 2017-12-20 DIAGNOSIS — I63.312 CEREBROVASCULAR ACCIDENT (CVA) DUE TO THROMBOSIS OF LEFT MIDDLE CEREBRAL ARTERY (HCC): ICD-10-CM

## 2017-12-20 DIAGNOSIS — I87.2 VENOUS INSUFFICIENCY: Primary | Chronic | ICD-10-CM

## 2017-12-20 DIAGNOSIS — E55.9 VITAMIN D DEFICIENCY: Chronic | ICD-10-CM

## 2017-12-20 DIAGNOSIS — M81.0 AGE-RELATED OSTEOPOROSIS WITHOUT CURRENT PATHOLOGICAL FRACTURE: Chronic | ICD-10-CM

## 2017-12-20 DIAGNOSIS — R41.3 MEMORY LOSS: Chronic | ICD-10-CM

## 2017-12-20 DIAGNOSIS — H90.3 SENSORINEURAL HEARING LOSS (SNHL) OF BOTH EARS: Chronic | ICD-10-CM

## 2017-12-20 DIAGNOSIS — M17.0 PRIMARY OSTEOARTHRITIS OF BOTH KNEES: Chronic | ICD-10-CM

## 2017-12-20 PROCEDURE — G8420 CALC BMI NORM PARAMETERS: HCPCS | Performed by: INTERNAL MEDICINE

## 2017-12-20 PROCEDURE — G8926 SPIRO NO PERF OR DOC: HCPCS | Performed by: INTERNAL MEDICINE

## 2017-12-20 PROCEDURE — 4040F PNEUMOC VAC/ADMIN/RCVD: CPT | Performed by: INTERNAL MEDICINE

## 2017-12-20 PROCEDURE — G8598 ASA/ANTIPLAT THER USED: HCPCS | Performed by: INTERNAL MEDICINE

## 2017-12-20 PROCEDURE — 1036F TOBACCO NON-USER: CPT | Performed by: INTERNAL MEDICINE

## 2017-12-20 PROCEDURE — G8484 FLU IMMUNIZE NO ADMIN: HCPCS | Performed by: INTERNAL MEDICINE

## 2017-12-20 PROCEDURE — 99214 OFFICE O/P EST MOD 30 MIN: CPT | Performed by: INTERNAL MEDICINE

## 2017-12-20 PROCEDURE — 1090F PRES/ABSN URINE INCON ASSESS: CPT | Performed by: INTERNAL MEDICINE

## 2017-12-20 PROCEDURE — 3023F SPIROM DOC REV: CPT | Performed by: INTERNAL MEDICINE

## 2017-12-20 PROCEDURE — 1123F ACP DISCUSS/DSCN MKR DOCD: CPT | Performed by: INTERNAL MEDICINE

## 2017-12-20 PROCEDURE — 4005F PHARM THX FOR OP RXD: CPT | Performed by: INTERNAL MEDICINE

## 2017-12-20 PROCEDURE — G8427 DOCREV CUR MEDS BY ELIG CLIN: HCPCS | Performed by: INTERNAL MEDICINE

## 2017-12-20 RX ORDER — ATORVASTATIN CALCIUM 20 MG/1
TABLET, FILM COATED ORAL
Qty: 90 TABLET | Refills: 3 | Status: SHIPPED | OUTPATIENT
Start: 2017-12-20 | End: 2019-01-11 | Stop reason: SDUPTHER

## 2017-12-20 RX ORDER — HYDROXYZINE HYDROCHLORIDE 25 MG/1
25 TABLET, FILM COATED ORAL NIGHTLY PRN
Qty: 30 TABLET | Refills: 5 | Status: SHIPPED | OUTPATIENT
Start: 2017-12-20 | End: 2019-07-18 | Stop reason: SDUPTHER

## 2017-12-20 ASSESSMENT — ENCOUNTER SYMPTOMS
ABDOMINAL PAIN: 0
TROUBLE SWALLOWING: 0
CONSTIPATION: 0
COUGH: 0
NAUSEA: 0
DIARRHEA: 0
SHORTNESS OF BREATH: 0

## 2017-12-20 NOTE — PROGRESS NOTES
total knee arthroplasty 11/10    TOE AMPUTATION Right 1/23/2017    TOE AMPUTATION 2ND DIGIT performed by Lynnette Corral DPM at [facility] OR      Family History   Problem Relation Age of Onset    Atrial Fibrillation Sister     Stroke Sister      ischemic    Breast Cancer Sister [de-identified]      Social History     Social History    Marital status: Single     Spouse name: N/A    Number of children: 0    Years of education: 15     Occupational History    retired      Social History Main Topics    Smoking status: Former Smoker    Smokeless tobacco: Never Used    Alcohol use No    Drug use: No    Sexual activity: No     Other Topics Concern    Not on file     Social History Narrative    No narrative on file      Allergies   Allergen Reactions    Other      novocain doesn't remember side effects      Current Outpatient Prescriptions   Medication Sig Dispense Refill    atorvastatin (LIPITOR) 20 MG tablet Take one pill daily 90 tablet 3    hydrOXYzine (ATARAX) 25 MG tablet Take 1 tablet by mouth nightly as needed (for sleep) 30 tablet 5    clopidogrel (PLAVIX) 75 MG tablet Take 1 tablet by mouth daily 90 tablet 3    metoprolol succinate (TOPROL XL) 25 MG extended release tablet Take 1 tablet by mouth daily For blood pressure 90 tablet 1    vitamin D (ERGOCALCIFEROL) 33433 units CAPS capsule TAKE ONE CAPSULE BY MOUTH ONCE WEEKLY 4 capsule 2    alendronate (FOSAMAX) 70 MG tablet TAKE 1 TABLET EVERY WEEK 12 tablet 3    hydrochlorothiazide (HYDRODIURIL) 25 MG tablet Take 1 tablet by mouth daily 90 tablet 3    aspirin (ASPIR-LOW) 81 MG EC tablet Take 81 mg by mouth daily       No current facility-administered medications for this visit. Review of Systems   Constitutional: Negative for fatigue and unexpected weight change. HENT: Positive for hearing loss. Negative for congestion and trouble swallowing. Respiratory: Negative for cough and shortness of breath.     Cardiovascular: Positive for leg

## 2017-12-21 ENCOUNTER — TRANSCRIBE ORDERS (OUTPATIENT)
Dept: ADMINISTRATIVE | Facility: HOSPITAL | Age: 82
End: 2017-12-21

## 2017-12-21 ENCOUNTER — HOSPITAL ENCOUNTER (OUTPATIENT)
Dept: GENERAL RADIOLOGY | Facility: HOSPITAL | Age: 82
Discharge: HOME | End: 2017-12-21
Attending: INTERNAL MEDICINE | Admitting: INTERNAL MEDICINE

## 2017-12-21 DIAGNOSIS — J43.8 OTHER EMPHYSEMA (HCC): Primary | ICD-10-CM

## 2017-12-21 DIAGNOSIS — J43.8 OTHER EMPHYSEMA (HCC): ICD-10-CM

## 2017-12-21 PROCEDURE — 71020 HC CHEST PA AND LATERAL: CPT

## 2018-01-09 DIAGNOSIS — J43.8 OTHER EMPHYSEMA (HCC): ICD-10-CM

## 2018-01-10 RX ORDER — METOPROLOL SUCCINATE 25 MG/1
TABLET, EXTENDED RELEASE ORAL
Qty: 90 TABLET | Refills: 1 | Status: SHIPPED | OUTPATIENT
Start: 2018-01-10 | End: 2018-03-24 | Stop reason: ALTCHOICE

## 2018-01-31 ENCOUNTER — TELEPHONE (OUTPATIENT)
Dept: INTERNAL MEDICINE | Age: 83
End: 2018-01-31

## 2018-01-31 NOTE — TELEPHONE ENCOUNTER
Chest xray was scanned into media was not dropped into box. It was scanned in 1/9/2018, will you please review results.

## 2018-03-04 ENCOUNTER — CARE COORDINATION (OUTPATIENT)
Dept: CARE COORDINATION | Age: 83
End: 2018-03-04

## 2018-03-24 ENCOUNTER — HOSPITAL ENCOUNTER (EMERGENCY)
Age: 83
Discharge: HOME | End: 2018-03-25
Payer: MEDICARE

## 2018-03-24 DIAGNOSIS — S81.851A CAT BITE OF RIGHT LOWER LEG, INITIAL ENCOUNTER: Primary | ICD-10-CM
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
## 2018-03-24 PROCEDURE — 99282 EMERGENCY DEPT VISIT SF MDM: CPT

## 2018-03-24 RX ORDER — TETANUS AND DIPHTHERIA TOXOIDS ADSORBED 2; 2 [LF]/.5ML; [LF]/.5ML
0.5 INJECTION INTRAMUSCULAR ONCE
Status: DISCONTINUED | OUTPATIENT
Start: 2018-03-24 | End: 2018-03-25 | Stop reason: HOSPADM

## 2018-03-24 RX ORDER — DOXYCYCLINE HYCLATE 100 MG
100 TABLET ORAL 2 TIMES DAILY
Qty: 14 TABLET | Refills: 0 | Status: SHIPPED | OUTPATIENT
Start: 2018-03-24 | End: 2018-03-26 | Stop reason: ALTCHOICE

## 2018-03-25 VITALS
RESPIRATION RATE: 18 BRPM | SYSTOLIC BLOOD PRESSURE: 162 MMHG | HEART RATE: 74 BPM | OXYGEN SATURATION: 91 % | BODY MASS INDEX: 22.15 KG/M2 | HEIGHT: 63 IN | DIASTOLIC BLOOD PRESSURE: 75 MMHG | TEMPERATURE: 97.7 F | WEIGHT: 125 LBS

## 2018-03-25 PROCEDURE — 99283 EMERGENCY DEPT VISIT LOW MDM: CPT | Performed by: NURSE PRACTITIONER

## 2018-03-25 NOTE — ED PROVIDER NOTES
normal.   Musculoskeletal:   2+pitting edema bilateral lower extremities   Neurological: She is alert. Skin: Skin is warm. Right lower leg with 5mm puncture wound with scant oozing of serous fluid. CMS intact bilateral lower extremities   Vitals reviewed. DIAGNOSTIC RESULTS     EKG: All EKG's are interpreted by the Emergency Department Physician who either signs or Co-signs this chart in the absence of a cardiologist.        RADIOLOGY:   Non-plain film images such as CT, Ultrasound and MRI are read by the radiologist. Plain radiographic images are visualized and preliminarily interpreted by the emergency physician with the below findings:        Interpretation per the Radiologist below, if available at the time of this note:    No orders to display         ED BEDSIDE ULTRASOUND:   Performed by ED Physician - none    LABS:  Labs Reviewed - No data to display    All other labs were within normal range or not returned as of this dictation. RE-ASSESSMENT           EMERGENCY DEPARTMENT COURSE and DIFFERENTIAL DIAGNOSIS/MDM:   Vitals:    Vitals:    03/24/18 2339 03/24/18 2342 03/24/18 2343 03/25/18 0002   BP: (!) 168/63 (!) 168/63  (!) 162/75   Pulse: 74      Resp:   18    Temp: 97.7 °F (36.5 °C)      TempSrc: Oral      SpO2: 95% 95%  91%   Weight:       Height:           MDM      CONSULTS:  None    PROCEDURES:  Unless otherwise noted below, none     Procedures    FINAL IMPRESSION      1.  Cat bite of right lower leg, initial encounter          DISPOSITION/PLAN   DISPOSITION        PATIENT REFERRED TO:  MD Keisha Conroy [phone]  [phone]    Schedule an appointment as soon as possible for a visit in 3 days  For wound re-check      DISCHARGE MEDICATIONS:       Discharge Medication List as of 3/24/2018 11:58 PM           Medication List      START taking these medications    doxycycline hyclate 100 MG tablet  Commonly known as:  VIBRA-TABS  Take 1 tablet by mouth 2 times daily for 7 days        ASK your doctor about these medications    alendronate 70 MG tablet  Commonly known as:  FOSAMAX  TAKE 1 TABLET EVERY WEEK     ASPIR-LOW 81 MG EC tablet  Generic drug:  aspirin     atorvastatin 20 MG tablet  Commonly known as:  LIPITOR  Take one pill daily     clopidogrel 75 MG tablet  Commonly known as:  PLAVIX  Take 1 tablet by mouth daily     hydrochlorothiazide 25 MG tablet  Commonly known as:  HYDRODIURIL  Take 1 tablet by mouth daily     vitamin D 11396 units Caps capsule  Commonly known as:  ERGOCALCIFEROL  TAKE ONE CAPSULE BY MOUTH ONCE WEEKLY           Where to Get Your Medications      You can get these medications from any pharmacy    Bring a paper prescription for each of these medications  · doxycycline hyclate 100 MG tablet         (Please note that portions of this note were completed with a voice recognition program.  Efforts were made to edit the dictations but occasionally words are mis-transcribed.)              Jeremie Knowles, GISELE  03/25/18 1944

## 2018-03-26 ENCOUNTER — CARE COORDINATION (OUTPATIENT)
Dept: CARE COORDINATION | Age: 83
End: 2018-03-26

## 2018-03-26 ENCOUNTER — TELEPHONE (OUTPATIENT)
Dept: INTERNAL MEDICINE | Age: 83
End: 2018-03-26

## 2018-03-26 ENCOUNTER — OFFICE VISIT (OUTPATIENT)
Dept: INTERNAL MEDICINE | Age: 83
End: 2018-03-26
Payer: MEDICARE

## 2018-03-26 VITALS — HEART RATE: 68 BPM | TEMPERATURE: 98.1 F | SYSTOLIC BLOOD PRESSURE: 150 MMHG | DIASTOLIC BLOOD PRESSURE: 74 MMHG

## 2018-03-26 DIAGNOSIS — S80.811S: Primary | ICD-10-CM

## 2018-03-26 PROCEDURE — 1123F ACP DISCUSS/DSCN MKR DOCD: CPT | Performed by: NURSE PRACTITIONER

## 2018-03-26 PROCEDURE — G8598 ASA/ANTIPLAT THER USED: HCPCS | Performed by: NURSE PRACTITIONER

## 2018-03-26 PROCEDURE — G8482 FLU IMMUNIZE ORDER/ADMIN: HCPCS | Performed by: NURSE PRACTITIONER

## 2018-03-26 PROCEDURE — 1090F PRES/ABSN URINE INCON ASSESS: CPT | Performed by: NURSE PRACTITIONER

## 2018-03-26 PROCEDURE — G8420 CALC BMI NORM PARAMETERS: HCPCS | Performed by: NURSE PRACTITIONER

## 2018-03-26 PROCEDURE — 4040F PNEUMOC VAC/ADMIN/RCVD: CPT | Performed by: NURSE PRACTITIONER

## 2018-03-26 PROCEDURE — G8427 DOCREV CUR MEDS BY ELIG CLIN: HCPCS | Performed by: NURSE PRACTITIONER

## 2018-03-26 PROCEDURE — 1036F TOBACCO NON-USER: CPT | Performed by: NURSE PRACTITIONER

## 2018-03-26 PROCEDURE — 99213 OFFICE O/P EST LOW 20 MIN: CPT | Performed by: NURSE PRACTITIONER

## 2018-03-26 ASSESSMENT — ENCOUNTER SYMPTOMS
PHOTOPHOBIA: 0
NAUSEA: 0
COLOR CHANGE: 0
VOICE CHANGE: 0
COUGH: 0
SHORTNESS OF BREATH: 0
RHINORRHEA: 0
BACK PAIN: 0
VOMITING: 0

## 2018-03-26 NOTE — PROGRESS NOTES
Cuff Size - - - - - -   Pulse - 68 - - - 74   Temp - 98.1 - - - 97.7   Resp - - - 18 - -   Weight - - - - - -   Height - - - - - -   BMI (wt*703/ht~2) - - - - - -   Some recent data might be hidden       Family History   Problem Relation Age of Onset    Atrial Fibrillation Sister     Stroke Sister      ischemic    Breast Cancer Sister [de-identified]       Social History   Substance Use Topics    Smoking status: Former Smoker    Smokeless tobacco: Never Used    Alcohol use No      Current Outpatient Prescriptions   Medication Sig Dispense Refill    atorvastatin (LIPITOR) 20 MG tablet Take one pill daily 90 tablet 3    clopidogrel (PLAVIX) 75 MG tablet Take 1 tablet by mouth daily 90 tablet 3    vitamin D (ERGOCALCIFEROL) 92875 units CAPS capsule TAKE ONE CAPSULE BY MOUTH ONCE WEEKLY 4 capsule 2    alendronate (FOSAMAX) 70 MG tablet TAKE 1 TABLET EVERY WEEK 12 tablet 3    hydrochlorothiazide (HYDRODIURIL) 25 MG tablet Take 1 tablet by mouth daily 90 tablet 3    aspirin (ASPIR-LOW) 81 MG EC tablet Take 81 mg by mouth daily       No current facility-administered medications for this visit. Allergies   Allergen Reactions    Other      novocain doesn't remember side effects       Health Maintenance   Topic Date Due    Shingles Vaccine (1 of 2 - 2 Dose Series) 03/25/1979    Potassium monitoring  07/25/2018    Creatinine monitoring  07/25/2018    DTaP/Tdap/Td vaccine (2 - Td) 07/01/2024    Flu vaccine  Completed    Pneumococcal low/med risk  Completed       Subjective:      Review of Systems   Constitutional: Negative for chills and fever. HENT: Negative for ear pain, hearing loss, rhinorrhea and voice change. Eyes: Negative for photophobia and visual disturbance. Respiratory: Negative for cough and shortness of breath. Cardiovascular: Negative for chest pain and palpitations. Gastrointestinal: Negative for nausea and vomiting. Endocrine: Negative.   Negative for cold intolerance and heat intolerance. Genitourinary: Negative for difficulty urinating and flank pain. Musculoskeletal: Negative for back pain and neck pain. Skin: Positive for wound (RLE). Negative for color change and rash. Allergic/Immunologic: Negative for environmental allergies and food allergies. Neurological: Negative for dizziness, speech difficulty and headaches. Hematological: Does not bruise/bleed easily. Psychiatric/Behavioral: Negative for sleep disturbance and suicidal ideas. Objective:     Physical Exam   Constitutional: She is oriented to person, place, and time. She appears well-developed and well-nourished. HENT:   Head: Atraumatic. Right Ear: External ear normal.   Left Ear: External ear normal.   Nose: Nose normal.   Mouth/Throat: Oropharynx is clear and moist.   Eyes: Conjunctivae are normal. Pupils are equal, round, and reactive to light. Neck: Normal range of motion. Neck supple. Cardiovascular: Normal rate, regular rhythm, S1 normal, S2 normal and normal heart sounds. Pulmonary/Chest: Effort normal and breath sounds normal.   Abdominal: Soft. Bowel sounds are normal.   Musculoskeletal: Normal range of motion. Neurological: She is alert and oriented to person, place, and time. Skin: Skin is warm and dry. Psychiatric: She has a normal mood and affect. Her behavior is normal.   Nursing note and vitals reviewed. BP (!) 150/74   Pulse 68   Temp 98.1 °F (36.7 °C)     Assessment:      1. Abrasion, right lower leg, sequela         Plan:     Advised patient to take antibiotic as directed; she states she does not like to take pills and likely will not be compliant with this. She may keep dressing applied when not at home but it is okay to keep it open to air with bacitracin ointment on it; she may shower and bathe like usual and wash with dial soap and warm water. Patient verbalizes understanding.  She the wound get worse, have purulent drainage, fever, red streaking she is to call for evaluation or go to ED. Patient given educational materials - see patient instructions. Discussed use, benefit, and side effects of prescribed medications. All patient questions answered. Pt voiced understanding. Reviewed health maintenance. Instructed to continue current medications, diet and exercise. Patient agreed with treatment plan. Follow up as directed. MEDICATIONS:  No orders of the defined types were placed in this encounter. Quality & Risk Score Accuracy - MEDICARE ADVANTAGE    Last edited 59/99/46 91:06 CDT by GISELE Littlejohn         ORDERS:  No orders of the defined types were placed in this encounter. Follow-up:  Return if symptoms worsen or fail to improve. PATIENT INSTRUCTIONS:  There are no Patient Instructions on file for this visit.   Electronically signed by GISELE Littlejohn on 3/26/2018 at 4:25 PM

## 2018-05-02 ENCOUNTER — TELEPHONE (OUTPATIENT)
Dept: INTERNAL MEDICINE | Age: 83
End: 2018-05-02

## 2018-05-02 DIAGNOSIS — Z01.89 VISIT FOR ROUTINE CHEST X-RAY: ICD-10-CM

## 2018-05-02 DIAGNOSIS — Z12.31 VISIT FOR SCREENING MAMMOGRAM: Primary | ICD-10-CM

## 2018-05-02 RX ORDER — ERGOCALCIFEROL 1.25 MG/1
50000 CAPSULE ORAL WEEKLY
Qty: 4 CAPSULE | Refills: 1 | Status: SHIPPED | OUTPATIENT
Start: 2018-05-02 | End: 2018-07-06 | Stop reason: SDUPTHER

## 2018-05-31 ENCOUNTER — TELEPHONE (OUTPATIENT)
Dept: INTERNAL MEDICINE | Age: 83
End: 2018-05-31

## 2018-05-31 ENCOUNTER — TRANSCRIBE ORDERS (OUTPATIENT)
Dept: ADMINISTRATIVE | Facility: HOSPITAL | Age: 83
End: 2018-05-31

## 2018-06-08 ENCOUNTER — TRANSCRIBE ORDERS (OUTPATIENT)
Dept: ADMINISTRATIVE | Facility: HOSPITAL | Age: 83
End: 2018-06-08

## 2018-06-08 DIAGNOSIS — Z12.31 ENCOUNTER FOR SCREENING MAMMOGRAM FOR MALIGNANT NEOPLASM OF BREAST: Primary | ICD-10-CM

## 2018-06-15 ENCOUNTER — APPOINTMENT (OUTPATIENT)
Dept: CT IMAGING | Age: 83
End: 2018-06-15
Payer: MEDICARE

## 2018-06-15 ENCOUNTER — HOSPITAL ENCOUNTER (EMERGENCY)
Age: 83
Discharge: HOME | End: 2018-06-15
Attending: EMERGENCY MEDICINE
Payer: MEDICARE

## 2018-06-15 ENCOUNTER — APPOINTMENT (OUTPATIENT)
Dept: GENERAL RADIOLOGY | Age: 83
End: 2018-06-15
Payer: MEDICARE

## 2018-06-15 VITALS
TEMPERATURE: 98.4 F | SYSTOLIC BLOOD PRESSURE: 166 MMHG | HEART RATE: 76 BPM | RESPIRATION RATE: 18 BRPM | DIASTOLIC BLOOD PRESSURE: 68 MMHG | OXYGEN SATURATION: 92 %

## 2018-06-15 DIAGNOSIS — S61.412A SKIN TEAR OF LEFT HAND WITHOUT COMPLICATION, INITIAL ENCOUNTER: ICD-10-CM

## 2018-06-15 DIAGNOSIS — S09.90XA CLOSED HEAD INJURY, INITIAL ENCOUNTER: Primary | ICD-10-CM

## 2018-06-15 DIAGNOSIS — S02.2XXA CLOSED FRACTURE OF NASAL BONE, INITIAL ENCOUNTER: ICD-10-CM

## 2018-06-15 DIAGNOSIS — S51.811A SKIN TEAR OF FOREARM WITHOUT COMPLICATION, RIGHT, INITIAL ENCOUNTER: ICD-10-CM

## 2018-06-15 PROCEDURE — 21310 PR CLOSED RX NOSE FRACTURE: CPT | Performed by: EMERGENCY MEDICINE

## 2018-06-15 PROCEDURE — 72125 CT NECK SPINE W/O DYE: CPT

## 2018-06-15 PROCEDURE — 70450 CT HEAD/BRAIN W/O DYE: CPT

## 2018-06-15 PROCEDURE — 99284 EMERGENCY DEPT VISIT MOD MDM: CPT | Performed by: EMERGENCY MEDICINE

## 2018-06-15 PROCEDURE — 6370000000 HC RX 637 (ALT 250 FOR IP): Performed by: EMERGENCY MEDICINE

## 2018-06-15 PROCEDURE — 70486 CT MAXILLOFACIAL W/O DYE: CPT

## 2018-06-15 PROCEDURE — 99284 EMERGENCY DEPT VISIT MOD MDM: CPT

## 2018-06-15 RX ORDER — HYDROCODONE BITARTRATE AND ACETAMINOPHEN 5; 325 MG/1; MG/1
1 TABLET ORAL ONCE
Status: COMPLETED | OUTPATIENT
Start: 2018-06-15 | End: 2018-06-15

## 2018-06-15 RX ADMIN — HYDROCODONE BITARTRATE AND ACETAMINOPHEN 1 TABLET: 5; 325 TABLET ORAL at 21:00

## 2018-06-15 ASSESSMENT — ENCOUNTER SYMPTOMS
ABDOMINAL PAIN: 0
DIARRHEA: 0
COUGH: 0
BACK PAIN: 0
VOMITING: 0
RHINORRHEA: 0
SORE THROAT: 0
NAUSEA: 0
SHORTNESS OF BREATH: 0

## 2018-06-15 ASSESSMENT — PAIN SCALES - GENERAL: PAINLEVEL_OUTOF10: 6

## 2018-06-16 ENCOUNTER — OFFICE VISIT (OUTPATIENT)
Dept: URGENT CARE | Age: 83
End: 2018-06-16
Payer: MEDICARE

## 2018-06-16 VITALS
BODY MASS INDEX: 21.43 KG/M2 | TEMPERATURE: 97.4 F | RESPIRATION RATE: 18 BRPM | WEIGHT: 121 LBS | SYSTOLIC BLOOD PRESSURE: 120 MMHG | HEART RATE: 63 BPM | DIASTOLIC BLOOD PRESSURE: 60 MMHG

## 2018-06-16 DIAGNOSIS — S02.2XXD CLOSED FRACTURE OF NASAL BONE WITH ROUTINE HEALING, SUBSEQUENT ENCOUNTER: ICD-10-CM

## 2018-06-16 DIAGNOSIS — S61.512D: ICD-10-CM
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
## 2018-06-16 DIAGNOSIS — S51.811D SKIN TEAR OF RIGHT FOREARM WITHOUT COMPLICATION, SUBSEQUENT ENCOUNTER: ICD-10-CM

## 2018-06-16 PROCEDURE — 4040F PNEUMOC VAC/ADMIN/RCVD: CPT | Performed by: NURSE PRACTITIONER

## 2018-06-16 PROCEDURE — 1036F TOBACCO NON-USER: CPT | Performed by: NURSE PRACTITIONER

## 2018-06-16 PROCEDURE — G8420 CALC BMI NORM PARAMETERS: HCPCS | Performed by: NURSE PRACTITIONER

## 2018-06-16 PROCEDURE — G8427 DOCREV CUR MEDS BY ELIG CLIN: HCPCS | Performed by: NURSE PRACTITIONER

## 2018-06-16 PROCEDURE — 1123F ACP DISCUSS/DSCN MKR DOCD: CPT | Performed by: NURSE PRACTITIONER

## 2018-06-16 PROCEDURE — 1090F PRES/ABSN URINE INCON ASSESS: CPT | Performed by: NURSE PRACTITIONER

## 2018-06-16 PROCEDURE — G8598 ASA/ANTIPLAT THER USED: HCPCS | Performed by: NURSE PRACTITIONER

## 2018-06-16 PROCEDURE — 99213 OFFICE O/P EST LOW 20 MIN: CPT | Performed by: NURSE PRACTITIONER

## 2018-06-16 ASSESSMENT — ENCOUNTER SYMPTOMS
PHOTOPHOBIA: 0
COLOR CHANGE: 0
SHORTNESS OF BREATH: 0
VOICE CHANGE: 0
RHINORRHEA: 0
COUGH: 0
VOMITING: 0
NAUSEA: 0
BACK PAIN: 0

## 2018-06-17 ENCOUNTER — HOSPITAL ENCOUNTER (EMERGENCY)
Age: 83
Discharge: HOME | End: 2018-06-17
Attending: EMERGENCY MEDICINE
Payer: MEDICARE

## 2018-06-17 ENCOUNTER — HOSPITAL ENCOUNTER (EMERGENCY)
Age: 83
Discharge: HOME | End: 2018-06-17
Payer: MEDICARE

## 2018-06-17 VITALS
HEIGHT: 63 IN | TEMPERATURE: 97.4 F | RESPIRATION RATE: 18 BRPM | HEART RATE: 81 BPM | OXYGEN SATURATION: 95 % | SYSTOLIC BLOOD PRESSURE: 144 MMHG | WEIGHT: 121 LBS | DIASTOLIC BLOOD PRESSURE: 74 MMHG | BODY MASS INDEX: 21.44 KG/M2

## 2018-06-17 VITALS — OXYGEN SATURATION: 97 % | TEMPERATURE: 97.9 F | RESPIRATION RATE: 16 BRPM | HEART RATE: 86 BPM

## 2018-06-17 DIAGNOSIS — Z48.00 DRESSING CHANGE: Primary | ICD-10-CM

## 2018-06-17 DIAGNOSIS — Z51.89 ENCOUNTER FOR WOUND RE-CHECK: ICD-10-CM

## 2018-06-17 PROCEDURE — 99282 EMERGENCY DEPT VISIT SF MDM: CPT | Performed by: PHYSICIAN ASSISTANT

## 2018-06-17 PROCEDURE — 99282 EMERGENCY DEPT VISIT SF MDM: CPT

## 2018-06-17 PROCEDURE — 4500000002 HC ER NO CHARGE

## 2018-06-17 RX ORDER — BACITRACIN ZINC AND POLYMYXIN B SULFATE 500; 1000 [USP'U]/G; [USP'U]/G
OINTMENT TOPICAL
Qty: 1 TUBE | Refills: 1 | Status: SHIPPED | OUTPATIENT
Start: 2018-06-17 | End: 2018-06-24

## 2018-06-17 ASSESSMENT — ENCOUNTER SYMPTOMS
APNEA: 0
SHORTNESS OF BREATH: 0
SINUS PRESSURE: 0
RHINORRHEA: 0
NAUSEA: 0
COUGH: 0
CHEST TIGHTNESS: 0
CONSTIPATION: 0
EYE PAIN: 0
VOMITING: 0
SORE THROAT: 0
DIARRHEA: 0
ABDOMINAL DISTENTION: 0
WHEEZING: 0
ABDOMINAL PAIN: 0

## 2018-06-17 ASSESSMENT — PAIN SCALES - GENERAL: PAINLEVEL_OUTOF10: 5

## 2018-06-17 ASSESSMENT — PAIN DESCRIPTION - LOCATION: LOCATION: LEG

## 2018-06-17 ASSESSMENT — PAIN DESCRIPTION - PAIN TYPE: TYPE: ACUTE PAIN

## 2018-06-19 ENCOUNTER — OFFICE VISIT (OUTPATIENT)
Dept: INTERNAL MEDICINE | Age: 83
End: 2018-06-19
Payer: MEDICARE

## 2018-06-19 VITALS
HEIGHT: 63 IN | SYSTOLIC BLOOD PRESSURE: 138 MMHG | DIASTOLIC BLOOD PRESSURE: 80 MMHG | HEART RATE: 88 BPM | TEMPERATURE: 99.3 F | BODY MASS INDEX: 22.32 KG/M2 | WEIGHT: 126 LBS | OXYGEN SATURATION: 98 %

## 2018-06-19 DIAGNOSIS — Z79.01 ANTICOAGULATED: ICD-10-CM

## 2018-06-19 DIAGNOSIS — S41.111S ARM LACERATION, RIGHT, SEQUELA: Primary | ICD-10-CM

## 2018-06-19 PROCEDURE — 4040F PNEUMOC VAC/ADMIN/RCVD: CPT | Performed by: NURSE PRACTITIONER

## 2018-06-19 PROCEDURE — 1036F TOBACCO NON-USER: CPT | Performed by: NURSE PRACTITIONER

## 2018-06-19 PROCEDURE — G8427 DOCREV CUR MEDS BY ELIG CLIN: HCPCS | Performed by: NURSE PRACTITIONER

## 2018-06-19 PROCEDURE — G8420 CALC BMI NORM PARAMETERS: HCPCS | Performed by: NURSE PRACTITIONER

## 2018-06-19 PROCEDURE — 1090F PRES/ABSN URINE INCON ASSESS: CPT | Performed by: NURSE PRACTITIONER

## 2018-06-19 PROCEDURE — 99213 OFFICE O/P EST LOW 20 MIN: CPT | Performed by: NURSE PRACTITIONER

## 2018-06-19 PROCEDURE — 1123F ACP DISCUSS/DSCN MKR DOCD: CPT | Performed by: NURSE PRACTITIONER

## 2018-06-19 PROCEDURE — G8598 ASA/ANTIPLAT THER USED: HCPCS | Performed by: NURSE PRACTITIONER

## 2018-06-19 ASSESSMENT — ENCOUNTER SYMPTOMS
SHORTNESS OF BREATH: 0
PHOTOPHOBIA: 0
NAUSEA: 0
COLOR CHANGE: 0
COUGH: 0
RHINORRHEA: 0
BACK PAIN: 0
VOMITING: 0
VOICE CHANGE: 0

## 2018-06-21 ENCOUNTER — TELEPHONE (OUTPATIENT)
Dept: INTERNAL MEDICINE | Age: 83
End: 2018-06-21

## 2018-07-06 ENCOUNTER — OFFICE VISIT (OUTPATIENT)
Dept: INTERNAL MEDICINE | Age: 83
End: 2018-07-06
Payer: MEDICARE

## 2018-07-06 VITALS
OXYGEN SATURATION: 97 % | DIASTOLIC BLOOD PRESSURE: 80 MMHG | BODY MASS INDEX: 24.62 KG/M2 | HEART RATE: 93 BPM | WEIGHT: 125.4 LBS | RESPIRATION RATE: 22 BRPM | SYSTOLIC BLOOD PRESSURE: 180 MMHG | HEIGHT: 60 IN

## 2018-07-06 DIAGNOSIS — R41.3 MEMORY LOSS: Chronic | ICD-10-CM

## 2018-07-06 DIAGNOSIS — M81.0 AGE-RELATED OSTEOPOROSIS WITHOUT CURRENT PATHOLOGICAL FRACTURE: Chronic | ICD-10-CM

## 2018-07-06 DIAGNOSIS — G30.0 EARLY ONSET ALZHEIMER'S DEMENTIA WITHOUT BEHAVIORAL DISTURBANCE (HCC): ICD-10-CM

## 2018-07-06 DIAGNOSIS — H90.3 SENSORINEURAL HEARING LOSS (SNHL) OF BOTH EARS: Chronic | ICD-10-CM

## 2018-07-06 DIAGNOSIS — I87.2 VENOUS INSUFFICIENCY: Chronic | ICD-10-CM

## 2018-07-06 DIAGNOSIS — I10 ESSENTIAL HYPERTENSION: Primary | ICD-10-CM

## 2018-07-06 DIAGNOSIS — E78.2 MIXED HYPERLIPIDEMIA: ICD-10-CM

## 2018-07-06 DIAGNOSIS — M72.0 DUPUYTREN'S CONTRACTURE OF BOTH HANDS: Chronic | ICD-10-CM

## 2018-07-06 DIAGNOSIS — I63.312 CEREBROVASCULAR ACCIDENT (CVA) DUE TO THROMBOSIS OF LEFT MIDDLE CEREBRAL ARTERY (HCC): ICD-10-CM

## 2018-07-06 DIAGNOSIS — E55.9 VITAMIN D DEFICIENCY: Chronic | ICD-10-CM

## 2018-07-06 DIAGNOSIS — I16.9 HYPERTENSIVE CRISIS: ICD-10-CM

## 2018-07-06 DIAGNOSIS — M51.9 LUMBAR DISC DISEASE: Chronic | ICD-10-CM

## 2018-07-06 DIAGNOSIS — F02.80 EARLY ONSET ALZHEIMER'S DEMENTIA WITHOUT BEHAVIORAL DISTURBANCE (HCC): ICD-10-CM

## 2018-07-06 PROCEDURE — 1036F TOBACCO NON-USER: CPT | Performed by: INTERNAL MEDICINE

## 2018-07-06 PROCEDURE — G8420 CALC BMI NORM PARAMETERS: HCPCS | Performed by: INTERNAL MEDICINE

## 2018-07-06 PROCEDURE — 99213 OFFICE O/P EST LOW 20 MIN: CPT | Performed by: INTERNAL MEDICINE

## 2018-07-06 PROCEDURE — 4040F PNEUMOC VAC/ADMIN/RCVD: CPT | Performed by: INTERNAL MEDICINE

## 2018-07-06 PROCEDURE — 1123F ACP DISCUSS/DSCN MKR DOCD: CPT | Performed by: INTERNAL MEDICINE

## 2018-07-06 PROCEDURE — 1090F PRES/ABSN URINE INCON ASSESS: CPT | Performed by: INTERNAL MEDICINE

## 2018-07-06 PROCEDURE — G0439 PPPS, SUBSEQ VISIT: HCPCS | Performed by: INTERNAL MEDICINE

## 2018-07-06 PROCEDURE — G8427 DOCREV CUR MEDS BY ELIG CLIN: HCPCS | Performed by: INTERNAL MEDICINE

## 2018-07-06 PROCEDURE — G8598 ASA/ANTIPLAT THER USED: HCPCS | Performed by: INTERNAL MEDICINE

## 2018-07-06 RX ORDER — ERGOCALCIFEROL 1.25 MG/1
50000 CAPSULE ORAL WEEKLY
Qty: 4 CAPSULE | Refills: 3 | Status: SHIPPED | OUTPATIENT
Start: 2018-07-06 | End: 2018-11-28 | Stop reason: SDUPTHER

## 2018-07-06 RX ORDER — METOPROLOL SUCCINATE 25 MG/1
TABLET, EXTENDED RELEASE ORAL
Qty: 90 TABLET | Refills: 3 | Status: SHIPPED | OUTPATIENT
Start: 2018-07-06 | End: 2019-01-11 | Stop reason: SDUPTHER

## 2018-07-06 RX ORDER — HYDROCHLOROTHIAZIDE 25 MG/1
25 TABLET ORAL DAILY
Qty: 90 TABLET | Refills: 3 | Status: SHIPPED | OUTPATIENT
Start: 2018-07-06 | End: 2019-01-11 | Stop reason: SDUPTHER

## 2018-07-06 RX ORDER — LOSARTAN POTASSIUM 25 MG/1
25 TABLET ORAL DAILY
Qty: 90 TABLET | Refills: 3 | Status: SHIPPED | OUTPATIENT
Start: 2018-07-06 | End: 2019-01-11 | Stop reason: SDUPTHER

## 2018-07-06 ASSESSMENT — ANXIETY QUESTIONNAIRES: GAD7 TOTAL SCORE: 0

## 2018-07-06 ASSESSMENT — PATIENT HEALTH QUESTIONNAIRE - PHQ9: SUM OF ALL RESPONSES TO PHQ QUESTIONS 1-9: 0

## 2018-07-06 ASSESSMENT — LIFESTYLE VARIABLES: HOW OFTEN DO YOU HAVE A DRINK CONTAINING ALCOHOL: 0

## 2018-07-06 NOTE — PROGRESS NOTES
Hypertensive crisis     Lumbar disc disease 8/10/2017    Memory loss     Meralgia paraesthetica     Other emphysema ([facility] [address] 12/20/2017    Primary osteoarthritis of knee 8/10/2017    Sensorineural hearing loss     Venous insufficiency     Vitamin D deficiency      Past Surgical History:   Procedure Laterality Date    COLONOSCOPY      KNEE SURGERY Bilateral     total knee arthroplasty 11/10    TOE AMPUTATION Right 1/23/2017    TOE AMPUTATION 2ND DIGIT performed by Kristin Ley DPM at [facility] OR     Family History   Problem Relation Age of Onset    Atrial Fibrillation Sister     Stroke Sister         ischemic    Breast Cancer Sister [de-identified]     Social History     Social History    Marital status: Single     Spouse name: N/A    Number of children: 0    Years of education: 15     Occupational History    retired      Social History Main Topics    Smoking status: Former Smoker     Quit date: 1965    Smokeless tobacco: Never Used    Alcohol use No    Drug use: No    Sexual activity: No     Other Topics Concern    Not on file     Social History Narrative    No narrative on file       Consultants:   Patient Care Team:  Taran Aguilera MD as PCP - General (Internal Medicine)  Taran Aguilera MD as PCP - S Attributed Provider  Golden Gagnon MD as Consulting Physician (Vascular Surgery)       [address] form completed by patient, reviewed and scanned into chart. Summary of HRA, and behavioral, psychosocial, cognitive and functional/safety screening results are documented in additional note within this encounter.     Wt Readings from Last 3 Encounters:   07/06/18 125 lb 6.4 oz (56.9 kg)   06/19/18 126 lb (57.2 kg)   06/17/18 121 lb (54.9 kg)     BP Readings from Last 3 Encounters:   07/06/18 (!) 180/80   06/19/18 138/80   06/17/18 (!) 144/74       Pertinent Physical Exam    Vitals:    07/06/18 1030   BP: (!) 180/80   Site: Left Arm   Position: Sitting   Pulse: 93   Resp: 22   SpO2: 97% Weight: 125 lb 6.4 oz (56.9 kg)   Height: 5' (1.524 m)     Body mass index is 24.49 kg/m². The following problems were reviewed today and where indicated follow up appointments were made and/or referrals ordered. Risk Factor Screenings with Interventions     Fall Risk:     Fall Risk Interventions:    · None indicated    Depression:     Depression Interventions:  · None indicated    Anxiety:     Anxiety Interventions:  · None indicated    Cognitive:     Cognitive Impairment Interventions:  · Patient declines any further evaluation/treatment for cognitive impairment       Social History     Social History    Marital status: Single     Spouse name: N/A    Number of children: 0    Years of education: 15     Occupational History    retired      Social History Main Topics    Smoking status: Former Smoker     Quit date: 1965    Smokeless tobacco: Never Used    Alcohol use No    Drug use: No    Sexual activity: No     Other Topics Concern    Not on file     Social History Narrative    No narrative on file       Substance Abuse Interventions:  · None indicated    Health Risk Assessment:        General Health Risk Interventions:  · None indicated       There is no height or weight on file to calculate BMI. Health Habits/Nutrition Interventions:  · None indicated       Hearing/Vision Interventions:  · None indicated       Safety Interventions:  · None indicated       ADL Interventions:  · None indicated    Personalized Preventive Plan     Immunization History   Administered Date(s) Administered    Influenza, High Dose 10/04/2016, 11/07/2017    Pneumococcal 13-valent Conjugate ([id number]) 01/04/2016    Pneumococcal Polysaccharide ([id number]) 10/20/2008    Tdap (Boostrix, Adacel) 07/01/2014       Health Maintenance Due   Topic Date Due    Shingles Vaccine (1 of 2 - 2 Dose Series) 03/25/1979       Recommendations for Preventive Services Due: see orders.   Recommended screening schedule for the next drip, rhinorrhea, sinus pressure, sore throat and trouble swallowing. Eyes: Negative for visual disturbance. Respiratory: Negative for cough and shortness of breath. Cardiovascular: Positive for leg swelling. Negative for chest pain and palpitations. Gastrointestinal: Negative for abdominal pain, blood in stool, constipation, diarrhea and nausea. Endocrine: Negative for cold intolerance, heat intolerance and polyuria. Genitourinary: Negative for difficulty urinating, dysuria, frequency and hematuria. Musculoskeletal: Negative for arthralgias, back pain and myalgias. Skin: Negative for color change and rash. Neurological: Negative for dizziness, syncope, weakness, numbness and headaches. Hematological: Negative for adenopathy. Does not bruise/bleed easily. Psychiatric/Behavioral: Negative for dysphoric mood. The patient is not nervous/anxious. He does like she has some mild memory loss in the past has declined any medication or workup        BP (!) 180/80 (Site: Left Arm, Position: Sitting)   Pulse 93   Resp 22   Ht 5' (1.524 m)   Wt 125 lb 6.4 oz (56.9 kg)   SpO2 97%   BMI 24.49 kg/m²    Physical Exam   Constitutional: She is oriented to person, place, and time. She appears well-developed and well-nourished. No distress. HENT:   Head: Normocephalic and atraumatic. Right Ear: External ear normal.   Left Ear: External ear normal.   Nose: Nose normal.   Mouth/Throat: Oropharynx is clear and moist.   Tympanic membranes normal   Eyes: Conjunctivae and EOM are normal. Pupils are equal, round, and reactive to light. Right eye exhibits no discharge. Left eye exhibits no discharge. No scleral icterus. Fundiscopic exam normal   Neck: No JVD present. No thyromegaly present. Cardiovascular: Normal rate, regular rhythm, normal heart sounds and intact distal pulses. Exam reveals no gallop. No murmur heard.   Pulses:       Dorsalis pedis pulses are 2+ on the right side, and 2+ on Age-related osteoporosis without current pathological fracture M81.0 733.01 Comprehensive Metabolic Panel      Vitamin D 25 Hydroxy   4. Dupuytren's contracture of both hands M72.0 728.6 Comprehensive Metabolic Panel   5. Early onset Alzheimer's dementia without behavioral disturbance G30.0 331.0 Comprehensive Metabolic Panel    J35.77 221.71    6. Memory loss R41.3 780.93 Comprehensive Metabolic Panel   7. Lumbar disc disease M51.9 722.93 Comprehensive Metabolic Panel   8. Hypertensive crisis I16.9 401.9 Comprehensive Metabolic Panel   9. Mixed hyperlipidemia E78.2 272.2 Comprehensive Metabolic Panel      Lipid Panel   10. Sensorineural hearing loss (SNHL) of both ears H90.3 389.18 Comprehensive Metabolic Panel   11. Venous insufficiency I87.2 459.81 CBC Auto Differential      Comprehensive Metabolic Panel   12. Vitamin D deficiency E55.9 268.9 Comprehensive Metabolic Panel      Vitamin D 25 Hydroxy        Orders Placed This Encounter   Procedures    CBC Auto Differential    Comprehensive Metabolic Panel    Lipid Panel    Vitamin D 25 Hydroxy          New Prescriptions    ELASTIC BANDAGES & SUPPORTS (ACE ELASTIC BANDAGE 4\") MISC    Wrap lower legs daily for 3-6 hrs as tolerated for edema. LOSARTAN (COZAAR) 25 MG TABLET    Take 1 tablet by mouth daily        ASSESSMENT/PLAN:  She is going to check her blood pressure closely at home and bring in her cuff for comparison at some point. I'm going to add losartan 25 mg a day to her current blood pressure regimen  Her vitamin D is stable and she is tolerating alendronate for her osteoporosis. Her lipid control remains very good on current medication. 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 She is having some continued problems with swelling in her lower extremities.  This is predominantly related to venous insufficiency and I suggested that she use Ace wraps for at least part of the day

## 2018-07-07 ASSESSMENT — ENCOUNTER SYMPTOMS
CONSTIPATION: 0
COUGH: 0
TROUBLE SWALLOWING: 0
SORE THROAT: 0
BLOOD IN STOOL: 0
COLOR CHANGE: 0
RHINORRHEA: 0
NAUSEA: 0
ABDOMINAL PAIN: 0
SINUS PRESSURE: 0
SHORTNESS OF BREATH: 0
DIARRHEA: 0
BACK PAIN: 0

## 2018-07-30 ENCOUNTER — HOSPITAL ENCOUNTER (OUTPATIENT)
Dept: MAMMOGRAPHY | Facility: HOSPITAL | Age: 83
Discharge: HOME | End: 2018-07-30
Attending: INTERNAL MEDICINE | Admitting: INTERNAL MEDICINE

## 2018-07-30 DIAGNOSIS — Z12.31 ENCOUNTER FOR SCREENING MAMMOGRAM FOR MALIGNANT NEOPLASM OF BREAST: ICD-10-CM

## 2018-07-30 PROCEDURE — 77063 BREAST TOMOSYNTHESIS BI: CPT

## 2018-07-30 PROCEDURE — 77067 SCR MAMMO BI INCL CAD: CPT

## 2018-09-04 RX ORDER — CLOPIDOGREL BISULFATE 75 MG/1
TABLET ORAL
Qty: 90 TABLET | Refills: 3 | Status: SHIPPED | OUTPATIENT
Start: 2018-09-04 | End: 2018-12-11 | Stop reason: SDUPTHER

## 2018-09-04 RX ORDER — CLOPIDOGREL BISULFATE 75 MG/1
75 TABLET ORAL DAILY
Qty: 90 TABLET | Refills: 3 | Status: SHIPPED | OUTPATIENT
Start: 2018-09-04 | End: 2019-01-11 | Stop reason: SDUPTHER

## 2018-09-05 ENCOUNTER — TRANSCRIBE ORDERS (OUTPATIENT)
Dept: ADMINISTRATIVE | Facility: HOSPITAL | Age: 83
End: 2018-09-05

## 2018-09-05 ENCOUNTER — HOSPITAL ENCOUNTER (OUTPATIENT)
Dept: GENERAL RADIOLOGY | Facility: HOSPITAL | Age: 83
Discharge: HOME | End: 2018-09-05
Attending: INTERNAL MEDICINE | Admitting: INTERNAL MEDICINE

## 2018-09-05 DIAGNOSIS — J43.8 OTHER EMPHYSEMA (HCC): Primary | ICD-10-CM

## 2018-09-05 DIAGNOSIS — J43.8 OTHER EMPHYSEMA (HCC): ICD-10-CM

## 2018-09-05 PROCEDURE — 71046 X-RAY EXAM CHEST 2 VIEWS: CPT

## 2018-11-28 RX ORDER — ERGOCALCIFEROL 1.25 MG/1
CAPSULE ORAL
Qty: 4 CAPSULE | Refills: 3 | Status: SHIPPED | OUTPATIENT
Start: 2018-11-28 | End: 2019-07-18 | Stop reason: SDUPTHER

## 2018-12-10 ENCOUNTER — TELEPHONE (OUTPATIENT)
Dept: INTERNAL MEDICINE | Age: 83
End: 2018-12-10

## 2018-12-11 ENCOUNTER — OFFICE VISIT (OUTPATIENT)
Dept: INTERNAL MEDICINE | Age: 83
End: 2018-12-11
Payer: MEDICARE

## 2018-12-11 VITALS
BODY MASS INDEX: 24.77 KG/M2 | DIASTOLIC BLOOD PRESSURE: 88 MMHG | HEIGHT: 60 IN | WEIGHT: 126.2 LBS | HEART RATE: 76 BPM | SYSTOLIC BLOOD PRESSURE: 164 MMHG | TEMPERATURE: 98.9 F | OXYGEN SATURATION: 99 %

## 2018-12-11 DIAGNOSIS — R30.0 DYSURIA: ICD-10-CM

## 2018-12-11 DIAGNOSIS — N30.01 ACUTE CYSTITIS WITH HEMATURIA: Primary | ICD-10-CM

## 2018-12-11 LAB
APPEARANCE FLUID: NORMAL
BILIRUBIN, POC: NORMAL
BLOOD URINE, POC: NORMAL
CLARITY, POC: NORMAL
COLOR, POC: YELLOW
GLUCOSE URINE, POC: NORMAL
KETONES, POC: 15
LEUKOCYTE EST, POC: NORMAL
NITRITE, POC: NORMAL
PH, POC: 5.5
PROTEIN, POC: 30
SPECIFIC GRAVITY, POC: 1.03
UROBILINOGEN, POC: 0.2

## 2018-12-11 PROCEDURE — 1123F ACP DISCUSS/DSCN MKR DOCD: CPT | Performed by: NURSE PRACTITIONER

## 2018-12-11 PROCEDURE — G8598 ASA/ANTIPLAT THER USED: HCPCS | Performed by: NURSE PRACTITIONER

## 2018-12-11 PROCEDURE — 4040F PNEUMOC VAC/ADMIN/RCVD: CPT | Performed by: NURSE PRACTITIONER

## 2018-12-11 PROCEDURE — 81002 URINALYSIS NONAUTO W/O SCOPE: CPT | Performed by: NURSE PRACTITIONER

## 2018-12-11 PROCEDURE — G8427 DOCREV CUR MEDS BY ELIG CLIN: HCPCS | Performed by: NURSE PRACTITIONER

## 2018-12-11 PROCEDURE — G8420 CALC BMI NORM PARAMETERS: HCPCS | Performed by: NURSE PRACTITIONER

## 2018-12-11 PROCEDURE — 1101F PT FALLS ASSESS-DOCD LE1/YR: CPT | Performed by: NURSE PRACTITIONER

## 2018-12-11 PROCEDURE — 1036F TOBACCO NON-USER: CPT | Performed by: NURSE PRACTITIONER

## 2018-12-11 PROCEDURE — G8484 FLU IMMUNIZE NO ADMIN: HCPCS | Performed by: NURSE PRACTITIONER

## 2018-12-11 PROCEDURE — 99213 OFFICE O/P EST LOW 20 MIN: CPT | Performed by: NURSE PRACTITIONER

## 2018-12-11 PROCEDURE — 1090F PRES/ABSN URINE INCON ASSESS: CPT | Performed by: NURSE PRACTITIONER

## 2018-12-11 RX ORDER — NITROFURANTOIN 25; 75 MG/1; MG/1
100 CAPSULE ORAL 2 TIMES DAILY
Qty: 20 CAPSULE | Refills: 0 | Status: SHIPPED | OUTPATIENT
Start: 2018-12-11 | End: 2018-12-21

## 2018-12-11 ASSESSMENT — ENCOUNTER SYMPTOMS
VOICE CHANGE: 0
COLOR CHANGE: 0
SHORTNESS OF BREATH: 0
NAUSEA: 0
VOMITING: 0
PHOTOPHOBIA: 0
COUGH: 0
BACK PAIN: 0
RHINORRHEA: 0

## 2018-12-11 NOTE — TELEPHONE ENCOUNTER
I called and spoke with the patient and she is not able to completely empty her bladder.  She is scheduled with Maribell tomorrow at 6:17 am.

## 2018-12-11 NOTE — PROGRESS NOTES
[facility]  [facility] [address]  Dept: [phone]  Dept Fax: [phone]  Loc: [phone]    Anita Hdz is a 80 y.o. female who presents today for her medical conditions/complaints as noted below. Anita Hdz is c/o of Urinary Retention        HPI:     HPI   Chief Complaint   Patient presents with    Urinary Retention     Patient presents today for evaluation of urinary retention; she feels like she is unable to fully void. Symptoms have been present for 4 days. She states she has to go urgently; she is drinking plenty of water. She denies fever or flank pain.      Past Medical History:   Diagnosis Date    Age-related osteoporosis without current pathological fracture 8/10/2017    Arthritis     BPV (benign positional vertigo)     Collagenous colitis 8/10/2017    Dupuytren's contracture of both hands 0/30/7256    Ehrlichiosis     Hyperlipidemia     Hypertensive crisis     Lumbar disc disease 8/10/2017    Memory loss     Meralgia paraesthetica     Other emphysema ([address] 12/20/2017    Primary osteoarthritis of knee 8/10/2017    Sensorineural hearing loss     Venous insufficiency     Vitamin D deficiency       Past Surgical History:   Procedure Laterality Date    COLONOSCOPY      KNEE SURGERY Bilateral     total knee arthroplasty 11/10    TOE AMPUTATION Right 1/23/2017    TOE AMPUTATION 2ND DIGIT performed by Kvng Chew DPM at [facility] 12/11/2018 7/6/2018 6/19/2018 6/17/2018 6/17/2018 5/11/0351   SYSTOLIC 092 983 898 - 100 (No Data)   DIASTOLIC 88 80 80 - 74 (No Data)   Site - Left Arm Left Arm - - -   Position - Sitting Sitting - - -   Cuff Size - - - - - -   Pulse 76 93 88 - 81 86   Temp 98.9 - 99.3 - 97.4 97.9   Resp - 22 - - 18 16   Weight 126 lb 3.2 oz 125 lb 6.4 oz 126 lb - 121 lb -   Height 5' 0\" 5' 0\" 5' 3\" - 5' 3\" -   BMI (wt*703/ht~2) 24.64 kg/m2 24.49 kg/m2 22.32 kg/m2 - 21.43 ear pain, hearing loss, rhinorrhea and voice change. Eyes: Negative for photophobia and visual disturbance. Respiratory: Negative for cough and shortness of breath. Cardiovascular: Negative for chest pain and palpitations. Gastrointestinal: Negative for nausea and vomiting. Endocrine: Negative. Negative for cold intolerance and heat intolerance. Genitourinary: Positive for dysuria. Negative for difficulty urinating and flank pain. Musculoskeletal: Negative for back pain and neck pain. Skin: Negative for color change and rash. Allergic/Immunologic: Negative for environmental allergies and food allergies. Neurological: Negative for dizziness, speech difficulty and headaches. Hematological: Does not bruise/bleed easily. Psychiatric/Behavioral: Negative for sleep disturbance and suicidal ideas. Objective:     Physical Exam   Constitutional: She is oriented to person, place, and time. She appears well-developed and well-nourished. HENT:   Head: Atraumatic. Right Ear: External ear normal.   Left Ear: External ear normal.   Nose: Nose normal.   Mouth/Throat: Oropharynx is clear and moist.   Eyes: Pupils are equal, round, and reactive to light. Conjunctivae are normal.   Neck: Normal range of motion. Neck supple. Cardiovascular: Normal rate, regular rhythm, S1 normal, S2 normal and normal heart sounds. Pulmonary/Chest: Effort normal and breath sounds normal.   Abdominal: Soft. Bowel sounds are normal.   Musculoskeletal: Normal range of motion. Neurological: She is alert and oriented to person, place, and time. Skin: Skin is warm and dry. Psychiatric: She has a normal mood and affect. Her behavior is normal.   Nursing note and vitals reviewed. BP (!) 164/88   Pulse 76   Temp 98.9 °F (37.2 °C)   Ht 5' (1.524 m)   Wt 126 lb 3.2 oz (57.2 kg)   SpO2 99%   BMI 24.65 kg/m²     Assessment:       Diagnosis Orders   1. Acute cystitis with hematuria     2.  Dysuria  POCT

## 2018-12-28 ENCOUNTER — TELEPHONE (OUTPATIENT)
Dept: INTERNAL MEDICINE | Age: 83
End: 2018-12-28

## 2019-01-11 ENCOUNTER — OFFICE VISIT (OUTPATIENT)
Dept: INTERNAL MEDICINE | Age: 84
End: 2019-01-11
Payer: MEDICARE

## 2019-01-11 ENCOUNTER — TELEPHONE (OUTPATIENT)
Dept: INTERNAL MEDICINE | Age: 84
End: 2019-01-11

## 2019-01-11 ENCOUNTER — TRANSCRIBE ORDERS (OUTPATIENT)
Dept: ADMINISTRATIVE | Facility: HOSPITAL | Age: 84
End: 2019-01-11

## 2019-01-11 VITALS
SYSTOLIC BLOOD PRESSURE: 176 MMHG | OXYGEN SATURATION: 96 % | BODY MASS INDEX: 25.4 KG/M2 | RESPIRATION RATE: 22 BRPM | HEART RATE: 71 BPM | WEIGHT: 129.4 LBS | HEIGHT: 60 IN | DIASTOLIC BLOOD PRESSURE: 84 MMHG

## 2019-01-11 DIAGNOSIS — E55.9 VITAMIN D DEFICIENCY: Chronic | ICD-10-CM

## 2019-01-11 DIAGNOSIS — E78.2 MIXED HYPERLIPIDEMIA: Primary | ICD-10-CM

## 2019-01-11 DIAGNOSIS — M81.0 AGE-RELATED OSTEOPOROSIS WITHOUT CURRENT PATHOLOGICAL FRACTURE: Chronic | ICD-10-CM

## 2019-01-11 DIAGNOSIS — R41.3 MEMORY LOSS: Chronic | ICD-10-CM

## 2019-01-11 DIAGNOSIS — I87.2 VENOUS STASIS DERMATITIS OF BOTH LOWER EXTREMITIES: Chronic | ICD-10-CM

## 2019-01-11 DIAGNOSIS — H90.3 SENSORINEURAL HEARING LOSS (SNHL) OF BOTH EARS: Chronic | ICD-10-CM

## 2019-01-11 DIAGNOSIS — Z78.0 MENOPAUSE: Primary | ICD-10-CM

## 2019-01-11 DIAGNOSIS — Z78.0 MENOPAUSE: ICD-10-CM

## 2019-01-11 DIAGNOSIS — J43.8 OTHER EMPHYSEMA (HCC): ICD-10-CM

## 2019-01-11 DIAGNOSIS — I87.2 VENOUS INSUFFICIENCY: Chronic | ICD-10-CM

## 2019-01-11 DIAGNOSIS — M51.9 LUMBAR DISC DISEASE: Chronic | ICD-10-CM

## 2019-01-11 DIAGNOSIS — I10 ESSENTIAL HYPERTENSION: ICD-10-CM

## 2019-01-11 DIAGNOSIS — M17.0 PRIMARY OSTEOARTHRITIS OF BOTH KNEES: Chronic | ICD-10-CM

## 2019-01-11 PROCEDURE — 4040F PNEUMOC VAC/ADMIN/RCVD: CPT | Performed by: INTERNAL MEDICINE

## 2019-01-11 PROCEDURE — 99214 OFFICE O/P EST MOD 30 MIN: CPT | Performed by: INTERNAL MEDICINE

## 2019-01-11 PROCEDURE — G8598 ASA/ANTIPLAT THER USED: HCPCS | Performed by: INTERNAL MEDICINE

## 2019-01-11 PROCEDURE — 1036F TOBACCO NON-USER: CPT | Performed by: INTERNAL MEDICINE

## 2019-01-11 PROCEDURE — 1090F PRES/ABSN URINE INCON ASSESS: CPT | Performed by: INTERNAL MEDICINE

## 2019-01-11 PROCEDURE — G8419 CALC BMI OUT NRM PARAM NOF/U: HCPCS | Performed by: INTERNAL MEDICINE

## 2019-01-11 PROCEDURE — G8482 FLU IMMUNIZE ORDER/ADMIN: HCPCS | Performed by: INTERNAL MEDICINE

## 2019-01-11 PROCEDURE — 1101F PT FALLS ASSESS-DOCD LE1/YR: CPT | Performed by: INTERNAL MEDICINE

## 2019-01-11 PROCEDURE — G8427 DOCREV CUR MEDS BY ELIG CLIN: HCPCS | Performed by: INTERNAL MEDICINE

## 2019-01-11 PROCEDURE — G8926 SPIRO NO PERF OR DOC: HCPCS | Performed by: INTERNAL MEDICINE

## 2019-01-11 PROCEDURE — 3023F SPIROM DOC REV: CPT | Performed by: INTERNAL MEDICINE

## 2019-01-11 PROCEDURE — 1123F ACP DISCUSS/DSCN MKR DOCD: CPT | Performed by: INTERNAL MEDICINE

## 2019-01-11 RX ORDER — ALENDRONATE SODIUM 70 MG/1
TABLET ORAL
Qty: 12 TABLET | Refills: 3 | Status: SHIPPED | OUTPATIENT
Start: 2019-01-11

## 2019-01-11 RX ORDER — METOPROLOL SUCCINATE 25 MG/1
TABLET, EXTENDED RELEASE ORAL
Qty: 90 TABLET | Refills: 3 | Status: SHIPPED | OUTPATIENT
Start: 2019-01-11

## 2019-01-11 RX ORDER — DONEPEZIL HYDROCHLORIDE 5 MG/1
5 TABLET, FILM COATED ORAL NIGHTLY
Qty: 90 TABLET | Refills: 1 | Status: SHIPPED | OUTPATIENT
Start: 2019-01-11 | End: 2019-02-13 | Stop reason: SDUPTHER

## 2019-01-11 RX ORDER — ATORVASTATIN CALCIUM 20 MG/1
TABLET, FILM COATED ORAL
Qty: 90 TABLET | Refills: 3 | Status: SHIPPED | OUTPATIENT
Start: 2019-01-11

## 2019-01-11 RX ORDER — HYDROCHLOROTHIAZIDE 25 MG/1
25 TABLET ORAL DAILY
Qty: 90 TABLET | Refills: 3 | Status: SHIPPED | OUTPATIENT
Start: 2019-01-11 | End: 2020-02-28 | Stop reason: SDUPTHER

## 2019-01-11 RX ORDER — CLOPIDOGREL BISULFATE 75 MG/1
75 TABLET ORAL DAILY
Qty: 90 TABLET | Refills: 3 | Status: SHIPPED | OUTPATIENT
Start: 2019-01-11

## 2019-01-11 RX ORDER — AZITHROMYCIN 250 MG/1
250 TABLET, FILM COATED ORAL DAILY
COMMUNITY
End: 2019-02-18 | Stop reason: ALTCHOICE

## 2019-01-11 RX ORDER — LOSARTAN POTASSIUM 25 MG/1
25 TABLET ORAL DAILY
Qty: 90 TABLET | Refills: 3 | Status: SHIPPED | OUTPATIENT
Start: 2019-01-11

## 2019-01-12 ASSESSMENT — ENCOUNTER SYMPTOMS
NAUSEA: 0
SHORTNESS OF BREATH: 0
COUGH: 0
ABDOMINAL PAIN: 0
DIARRHEA: 0

## 2019-01-15 ENCOUNTER — TELEPHONE (OUTPATIENT)
Dept: INTERNAL MEDICINE | Age: 84
End: 2019-01-15

## 2019-01-24 ENCOUNTER — HOSPITAL ENCOUNTER (OUTPATIENT)
Dept: BONE DENSITY | Facility: HOSPITAL | Age: 84
Discharge: HOME | End: 2019-01-24
Attending: INTERNAL MEDICINE | Admitting: INTERNAL MEDICINE

## 2019-01-24 DIAGNOSIS — Z78.0 MENOPAUSE: ICD-10-CM

## 2019-01-24 PROCEDURE — 77080 DXA BONE DENSITY AXIAL: CPT

## 2019-02-11 ENCOUNTER — TELEPHONE (OUTPATIENT)
Dept: INTERNAL MEDICINE | Age: 84
End: 2019-02-11

## 2019-02-13 ENCOUNTER — TELEPHONE (OUTPATIENT)
Dept: INTERNAL MEDICINE | Age: 84
End: 2019-02-13

## 2019-02-13 RX ORDER — DONEPEZIL HYDROCHLORIDE 5 MG/1
5 TABLET, FILM COATED ORAL NIGHTLY
Qty: 90 TABLET | Refills: 1 | Status: SHIPPED | OUTPATIENT
Start: 2019-02-13 | End: 2019-11-21 | Stop reason: SDUPTHER

## 2019-02-18 ENCOUNTER — OFFICE VISIT (OUTPATIENT)
Dept: INTERNAL MEDICINE | Age: 84
End: 2019-02-18
Payer: MEDICARE

## 2019-02-18 VITALS
BODY MASS INDEX: 25.91 KG/M2 | OXYGEN SATURATION: 98 % | WEIGHT: 132 LBS | HEIGHT: 60 IN | SYSTOLIC BLOOD PRESSURE: 132 MMHG | TEMPERATURE: 98.4 F | HEART RATE: 74 BPM | DIASTOLIC BLOOD PRESSURE: 74 MMHG

## 2019-02-18 DIAGNOSIS — R33.9 URINARY RETENTION: Primary | ICD-10-CM

## 2019-02-18 DIAGNOSIS — R30.0 DYSURIA: ICD-10-CM

## 2019-02-18 LAB
APPEARANCE FLUID: CLEAR
BILIRUBIN, POC: NORMAL
BLOOD URINE, POC: NORMAL
CLARITY, POC: CLEAR
COLOR, POC: YELLOW
GLUCOSE URINE, POC: NORMAL
KETONES, POC: NORMAL
LEUKOCYTE EST, POC: NORMAL
NITRITE, POC: NORMAL
PH, POC: 7
PROTEIN, POC: NORMAL
SPECIFIC GRAVITY, POC: 1.02
UROBILINOGEN, POC: 1

## 2019-02-18 PROCEDURE — 1123F ACP DISCUSS/DSCN MKR DOCD: CPT | Performed by: NURSE PRACTITIONER

## 2019-02-18 PROCEDURE — G8482 FLU IMMUNIZE ORDER/ADMIN: HCPCS | Performed by: NURSE PRACTITIONER

## 2019-02-18 PROCEDURE — 1036F TOBACCO NON-USER: CPT | Performed by: NURSE PRACTITIONER

## 2019-02-18 PROCEDURE — 99213 OFFICE O/P EST LOW 20 MIN: CPT | Performed by: NURSE PRACTITIONER

## 2019-02-18 PROCEDURE — G8419 CALC BMI OUT NRM PARAM NOF/U: HCPCS | Performed by: NURSE PRACTITIONER

## 2019-02-18 PROCEDURE — G8598 ASA/ANTIPLAT THER USED: HCPCS | Performed by: NURSE PRACTITIONER

## 2019-02-18 PROCEDURE — G8427 DOCREV CUR MEDS BY ELIG CLIN: HCPCS | Performed by: NURSE PRACTITIONER

## 2019-02-18 PROCEDURE — 1101F PT FALLS ASSESS-DOCD LE1/YR: CPT | Performed by: NURSE PRACTITIONER

## 2019-02-18 PROCEDURE — 4040F PNEUMOC VAC/ADMIN/RCVD: CPT | Performed by: NURSE PRACTITIONER

## 2019-02-18 PROCEDURE — 1090F PRES/ABSN URINE INCON ASSESS: CPT | Performed by: NURSE PRACTITIONER

## 2019-02-18 PROCEDURE — 81002 URINALYSIS NONAUTO W/O SCOPE: CPT | Performed by: NURSE PRACTITIONER

## 2019-02-18 ASSESSMENT — PATIENT HEALTH QUESTIONNAIRE - PHQ9
1. LITTLE INTEREST OR PLEASURE IN DOING THINGS: 0
SUM OF ALL RESPONSES TO PHQ QUESTIONS 1-9: 0
SUM OF ALL RESPONSES TO PHQ QUESTIONS 1-9: 0
2. FEELING DOWN, DEPRESSED OR HOPELESS: 0
SUM OF ALL RESPONSES TO PHQ9 QUESTIONS 1 & 2: 0

## 2019-02-19 ASSESSMENT — ENCOUNTER SYMPTOMS
VOICE CHANGE: 0
VOMITING: 0
COUGH: 0
RHINORRHEA: 0
NAUSEA: 0
SHORTNESS OF BREATH: 0
PHOTOPHOBIA: 0
COLOR CHANGE: 0
BACK PAIN: 0

## 2019-02-21 ENCOUNTER — OFFICE VISIT (OUTPATIENT)
Dept: UROLOGY | Age: 84
End: 2019-02-21

## 2019-02-21 VITALS — BODY MASS INDEX: 25.39 KG/M2 | TEMPERATURE: 98 F | WEIGHT: 130 LBS

## 2019-02-21 DIAGNOSIS — R33.9 RETENTION OF URINE: Primary | ICD-10-CM

## 2019-02-21 PROCEDURE — 99999 PR OFFICE/OUTPT VISIT,PROCEDURE ONLY: CPT | Performed by: NURSE PRACTITIONER

## 2019-02-25 ENCOUNTER — TRANSCRIBE ORDERS (OUTPATIENT)
Dept: ADMINISTRATIVE | Facility: HOSPITAL | Age: 84
End: 2019-02-25

## 2019-02-25 ENCOUNTER — HOSPITAL ENCOUNTER (OUTPATIENT)
Dept: ULTRASOUND IMAGING | Facility: HOSPITAL | Age: 84
Discharge: HOME | End: 2019-02-25
Admitting: PODIATRIST

## 2019-02-25 DIAGNOSIS — I87.8 OTHER SPECIFIED DISORDERS OF VEINS: Primary | ICD-10-CM

## 2019-02-25 DIAGNOSIS — I87.8 OTHER SPECIFIED DISORDERS OF VEINS: ICD-10-CM

## 2019-02-25 DIAGNOSIS — R60.0 LOCALIZED EDEMA: ICD-10-CM

## 2019-02-25 PROCEDURE — 93970 EXTREMITY STUDY: CPT

## 2019-07-16 DIAGNOSIS — I10 ESSENTIAL HYPERTENSION: ICD-10-CM

## 2019-07-16 DIAGNOSIS — I87.2 VENOUS STASIS DERMATITIS OF BOTH LOWER EXTREMITIES: Chronic | ICD-10-CM

## 2019-07-16 DIAGNOSIS — E55.9 VITAMIN D DEFICIENCY: Chronic | ICD-10-CM

## 2019-07-16 DIAGNOSIS — E78.2 MIXED HYPERLIPIDEMIA: ICD-10-CM

## 2019-07-16 DIAGNOSIS — M81.0 AGE-RELATED OSTEOPOROSIS WITHOUT CURRENT PATHOLOGICAL FRACTURE: Chronic | ICD-10-CM

## 2019-07-16 DIAGNOSIS — M17.0 PRIMARY OSTEOARTHRITIS OF BOTH KNEES: Chronic | ICD-10-CM

## 2019-07-18 ENCOUNTER — HOSPITAL ENCOUNTER (OUTPATIENT)
Dept: NON INVASIVE DIAGNOSTICS | Age: 84
Discharge: HOME | End: 2019-07-18
Payer: MEDICARE

## 2019-07-18 ENCOUNTER — OFFICE VISIT (OUTPATIENT)
Dept: INTERNAL MEDICINE | Age: 84
End: 2019-07-18
Payer: MEDICARE

## 2019-07-18 VITALS
HEIGHT: 60 IN | HEART RATE: 74 BPM | WEIGHT: 123 LBS | OXYGEN SATURATION: 98 % | DIASTOLIC BLOOD PRESSURE: 72 MMHG | BODY MASS INDEX: 24.15 KG/M2 | SYSTOLIC BLOOD PRESSURE: 120 MMHG

## 2019-07-18 DIAGNOSIS — E55.9 VITAMIN D DEFICIENCY: ICD-10-CM

## 2019-07-18 DIAGNOSIS — Z00.00 ROUTINE GENERAL MEDICAL EXAMINATION AT A HEALTH CARE FACILITY: ICD-10-CM

## 2019-07-18 DIAGNOSIS — Z00.00 MEDICARE ANNUAL WELLNESS VISIT, SUBSEQUENT: Primary | ICD-10-CM

## 2019-07-18 DIAGNOSIS — R53.83 FATIGUE, UNSPECIFIED TYPE: ICD-10-CM

## 2019-07-18 DIAGNOSIS — I10 ESSENTIAL HYPERTENSION: ICD-10-CM

## 2019-07-18 DIAGNOSIS — I63.312 CEREBROVASCULAR ACCIDENT (CVA) DUE TO THROMBOSIS OF LEFT MIDDLE CEREBRAL ARTERY (HCC): ICD-10-CM

## 2019-07-18 DIAGNOSIS — M81.0 AGE-RELATED OSTEOPOROSIS WITHOUT CURRENT PATHOLOGICAL FRACTURE: Chronic | ICD-10-CM

## 2019-07-18 DIAGNOSIS — M19.90 ARTHRITIS: ICD-10-CM

## 2019-07-18 DIAGNOSIS — E78.2 MIXED HYPERLIPIDEMIA: ICD-10-CM

## 2019-07-18 DIAGNOSIS — F51.01 PRIMARY INSOMNIA: ICD-10-CM

## 2019-07-18 DIAGNOSIS — R60.0 LOCALIZED EDEMA: ICD-10-CM

## 2019-07-18 PROCEDURE — G0439 PPPS, SUBSEQ VISIT: HCPCS | Performed by: INTERNAL MEDICINE

## 2019-07-18 PROCEDURE — 99213 OFFICE O/P EST LOW 20 MIN: CPT | Performed by: INTERNAL MEDICINE

## 2019-07-18 PROCEDURE — G8420 CALC BMI NORM PARAMETERS: HCPCS | Performed by: INTERNAL MEDICINE

## 2019-07-18 PROCEDURE — 1036F TOBACCO NON-USER: CPT | Performed by: INTERNAL MEDICINE

## 2019-07-18 PROCEDURE — G8598 ASA/ANTIPLAT THER USED: HCPCS | Performed by: INTERNAL MEDICINE

## 2019-07-18 PROCEDURE — 1123F ACP DISCUSS/DSCN MKR DOCD: CPT | Performed by: INTERNAL MEDICINE

## 2019-07-18 PROCEDURE — 4040F PNEUMOC VAC/ADMIN/RCVD: CPT | Performed by: INTERNAL MEDICINE

## 2019-07-18 PROCEDURE — 1090F PRES/ABSN URINE INCON ASSESS: CPT | Performed by: INTERNAL MEDICINE

## 2019-07-18 PROCEDURE — 93971 EXTREMITY STUDY: CPT

## 2019-07-18 PROCEDURE — G8427 DOCREV CUR MEDS BY ELIG CLIN: HCPCS | Performed by: INTERNAL MEDICINE

## 2019-07-18 RX ORDER — FUROSEMIDE 20 MG/1
20 TABLET ORAL DAILY PRN
Qty: 30 TABLET | Refills: 1 | Status: SHIPPED | OUTPATIENT
Start: 2019-07-18

## 2019-07-18 RX ORDER — HYDROXYZINE HYDROCHLORIDE 25 MG/1
25 TABLET, FILM COATED ORAL NIGHTLY PRN
Qty: 90 TABLET | Refills: 3 | Status: SHIPPED | OUTPATIENT
Start: 2019-07-18 | End: 2019-08-17

## 2019-07-18 RX ORDER — ERGOCALCIFEROL 1.25 MG/1
CAPSULE ORAL
Qty: 12 CAPSULE | Refills: 3 | Status: SHIPPED | OUTPATIENT
Start: 2019-07-18

## 2019-07-18 ASSESSMENT — LIFESTYLE VARIABLES: HOW OFTEN DO YOU HAVE A DRINK CONTAINING ALCOHOL: 0

## 2019-07-18 ASSESSMENT — PATIENT HEALTH QUESTIONNAIRE - PHQ9
SUM OF ALL RESPONSES TO PHQ QUESTIONS 1-9: 0
SUM OF ALL RESPONSES TO PHQ QUESTIONS 1-9: 0

## 2019-07-18 NOTE — PROGRESS NOTES
Chief Complaint   Patient presents with   [facility] AWV     has stopped taking low dose aspirin    New Patient     Dr. Sotero Herring Transfer    Hypertension    Hyperlipidemia    Arthritis       HPI: Patient is here today for Medicare annual wellness visit and to establish as a new patient to me I have known her for a long time she used to see Dr. Mario Coates. 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     Past Medical History:   Diagnosis Date    Age-related osteoporosis without current pathological fracture 8/10/2017    Arthritis     BPV (benign positional vertigo)     Collagenous colitis 8/10/2017    Dupuytren's contracture of both hands 1/01/7178    Ehrlichiosis     Hyperlipidemia     Hypertensive crisis     Lumbar disc disease 8/10/2017    Memory loss     Meralgia paraesthetica     Other emphysema ([address] 12/20/2017    Primary osteoarthritis of knee 8/10/2017    Sensorineural hearing loss     Venous insufficiency     Venous stasis dermatitis of both lower extremities 1/11/2019    Vitamin D deficiency        Past Surgical History:   Procedure Laterality Date    COLONOSCOPY      KNEE SURGERY Bilateral     total knee arthroplasty 11/10    TOE AMPUTATION Right 1/23/2017    TOE AMPUTATION 2ND DIGIT performed by Girma Christensen DPM at [facility] OR       Family History   Problem Relation Age of Onset    Atrial Fibrillation Sister     Stroke Sister         ischemic    Breast Cancer Sister [de-identified]       Social History     Socioeconomic History    Marital status: Single     Spouse name: Not on file    Number of children: 0    Years of education: 15    Highest education level: Not on file   Occupational History    Occupation: retired   Social Needs    Financial resource strain: Not on file    Food insecurity:     Worry: Not on file     Inability: Not on file   [facility] needs:     Medical: Not on file     Non-medical: Not on file   Tobacco Use    Smoking status: Former Smoker     Last attempt to quit: 1965     Years since quittin.5    Smokeless tobacco: Never Used   Substance and Sexual Activity    Alcohol use: No    Drug use: No    Sexual activity: Never   Lifestyle    Physical activity:     Days per week: Not on file     Minutes per session: Not on file    Stress: Not on file   Relationships    Social connections:     Talks on phone: Not on file     Gets together: Not on file     Attends [employer] service: Not on file     Active member of club or organization: Not on file     Attends meetings of clubs or organizations: Not on file     Relationship status: Not on file    Intimate partner violence:     Fear of current or ex partner: Not on file     Emotionally abused: Not on file     Physically abused: Not on file     Forced sexual activity: Not on file   Other Topics Concern    Not on file   Social History Narrative    Not on file       Allergies   Allergen Reactions    Other      novocain doesn't remember side effects       Current Outpatient Medications   Medication Sig Dispense Refill    vitamin D (ERGOCALCIFEROL) 19266 units CAPS capsule TAKE ONE CAPSULE BY MOUTH ONCE WEEKLY 12 capsule 3    hydrOXYzine (ATARAX) 25 MG tablet Take 1 tablet by mouth nightly as needed (for sleep) 90 tablet 3    furosemide (LASIX) 20 MG tablet Take 1 tablet by mouth daily as needed (edema) 30 tablet 1    donepezil (ARICEPT) 5 MG tablet Take 1 tablet by mouth nightly 90 tablet 1    Multiple Vitamins-Minerals (PRESERVISION AREDS 2 PO) Take by mouth daily      metoprolol succinate (TOPROL XL) 25 MG extended release tablet TAKE 1 TABLET EVERY DAY FOR BLOOD PRESSURE 90 tablet 3 25.78 kg/m²   01/11/19 25.27 kg/m²   12/11/18 24.65 kg/m²   07/06/18 24.49 kg/m²     Resp Readings from Last 7 Encounters:   07/29/19 18   01/11/19 22   07/06/18 22   06/17/18 18   06/17/18 16   06/16/18 18   06/15/18 18       Physical Exam   Constitutional: She is oriented to person, place, and time. She appears well-developed. No distress. Slightly frail-appearing   HENT:   Right Ear: External ear normal. Tympanic membrane is not injected. Left Ear: External ear normal. Tympanic membrane is not injected. Mouth/Throat: Oropharynx is clear and moist. No oropharyngeal exudate. Eyes: Conjunctivae are normal. No scleral icterus. Neck: Neck supple. Carotid bruit is not present. No thyroid mass and no thyromegaly present. Cardiovascular: Normal rate, regular rhythm, S1 normal and S2 normal. Exam reveals no S3 and no S4. No murmur heard. Pulmonary/Chest: Effort normal and breath sounds normal. No respiratory distress. She has no wheezes. She has no rales. Abdominal: Soft. Normal appearance and bowel sounds are normal. She exhibits no distension and no mass. There is no hepatosplenomegaly. There is no tenderness. Musculoskeletal:   moderate edema and mild arthritis changes. Lymphadenopathy:     She has no cervical adenopathy. Right: No supraclavicular adenopathy present. Left: No supraclavicular adenopathy present. Neurological: She is alert and oriented to person, place, and time. She has normal strength. No cranial nerve deficit. Skin: Skin is intact. No rash noted.        Results for orders placed or performed in visit on 02/18/19   POCT Urinalysis no Micro   Result Value Ref Range    Color, UA yellow     Clarity, UA clear     Glucose, UA POC neg     Bilirubin, UA neg     Ketones, UA neg     Spec Grav, UA 1.025     Blood, UA POC neg     pH, UA 7.0     Protein, UA POC neg     Urobilinogen, UA 1.0     Leukocytes, UA neg     Nitrite, UA neg     Appearance, Fluid Clear Clear, Slightly Interventions:  · Patient is very active for her age still does quite a bit of activity in terms of gardening yard work etc.    ADL:  ADLs  In the past 7 days, did you need help from others to perform any of the following everyday activities? Eating, dressing, grooming, bathing, toileting, or walking/balance?: None  In the past 7 days, did you need help from others to take care of any of the following? Laundry, housekeeping, banking/finances, shopping, telephone use, food preparation, transportation, or taking medications?: [employer], Transportation  ADL Interventions:  · Recommend avoiding precarious situations patient does a lot of work for herself try to use the assistance of others. Personalized Preventive Plan   Current Health Maintenance Status  Immunization History   Administered Date(s) Administered    Influenza, High Dose (Fluzone 65 yrs and older) 10/04/2016, 11/07/2017, 10/08/2018    Pneumococcal Conjugate 13-valent ([id number]) 01/04/2016    Pneumococcal Polysaccharide (Fysligows32) 10/20/2008    Tdap (Boostrix, Adacel) 07/01/2014        Health Maintenance   Topic Date Due    Shingles Vaccine (1 of 2) 03/25/1979    Annual Wellness Visit (AWV)  07/06/2019    Flu vaccine (1) 09/01/2019    Potassium monitoring  07/29/2020    Creatinine monitoring  07/29/2020    DTaP/Tdap/Td vaccine (2 - Td) 07/01/2024    Pneumococcal 65+ years Vaccine  Completed     Recommendations for Preventive Services Due: see orders and patient instructions/AVS.  .   Recommended screening schedule for the next 5-10 years is provided to the patient in written form: see Patient Instructions/AVS.

## 2019-07-29 ENCOUNTER — OFFICE VISIT (OUTPATIENT)
Dept: URGENT CARE | Age: 84
End: 2019-07-29
Payer: MEDICARE

## 2019-07-29 VITALS
RESPIRATION RATE: 18 BRPM | SYSTOLIC BLOOD PRESSURE: 172 MMHG | BODY MASS INDEX: 24.22 KG/M2 | HEART RATE: 72 BPM | DIASTOLIC BLOOD PRESSURE: 84 MMHG | OXYGEN SATURATION: 97 % | TEMPERATURE: 99.2 F | WEIGHT: 124 LBS
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
## 2019-07-29 PROCEDURE — G8420 CALC BMI NORM PARAMETERS: HCPCS | Performed by: NURSE PRACTITIONER

## 2019-07-29 PROCEDURE — 99211 OFF/OP EST MAY X REQ PHY/QHP: CPT | Performed by: NURSE PRACTITIONER

## 2019-07-29 PROCEDURE — G8427 DOCREV CUR MEDS BY ELIG CLIN: HCPCS | Performed by: NURSE PRACTITIONER

## 2019-07-29 NOTE — PROGRESS NOTES
[facility]RG[facility]  [address] [place] [address] [address]  [address] [address]  Dept: [phone]  Loc: [phone]    aMrio Parmar is a 80 y.o. female who presents today for her medical conditions/complaintsas noted below. Mario Parmar is c/o of Tick Removal        HPI:     HPI  Ms. Leanne Oglesby presents today for tick removal. She noticed the tick last night when it became itchy. No fever. She is requesting that we help her remove the tick.    Past Medical History:   Diagnosis Date    Age-related osteoporosis without current pathological fracture 8/10/2017    Arthritis     BPV (benign positional vertigo)     Collagenous colitis 8/10/2017    Dupuytren's contracture of both hands     Ehrlichiosis     Hyperlipidemia     Hypertensive crisis     Lumbar disc disease 8/10/2017    Memory loss     Meralgia paraesthetica     Other emphysema ([address] 2017    Primary osteoarthritis of knee 8/10/2017    Sensorineural hearing loss     Venous insufficiency     Venous stasis dermatitis of both lower extremities 2019    Vitamin D deficiency      Past Surgical History:   Procedure Laterality Date    COLONOSCOPY      KNEE SURGERY Bilateral     total knee arthroplasty 11/10    TOE AMPUTATION Right 2017    TOE AMPUTATION 2ND DIGIT performed by Eli Garcia DPM at [facility]       Family History   Problem Relation Age of Onset    Atrial Fibrillation Sister     Stroke Sister         ischemic    Breast Cancer Sister [de-identified]       Social History     Tobacco Use    Smoking status: Former Smoker     Last attempt to quit: 1965     Years since quittin.5    Smokeless tobacco: Never Used   Substance Use Topics    Alcohol use: No      Current Outpatient Medications   Medication Sig Dispense Refill    vitamin D (ERGOCALCIFEROL) 20977 units CAPS capsule TAKE ONE CAPSULE BY MOUTH ONCE WEEKLY 12 capsule 3    hydrOXYzine (ATARAX) 25 MG tablet Take 1 tablet by mouth nightly as needed (for sleep) 90 tablet 3    furosemide (LASIX) 20 MG tablet Take 1 tablet by mouth daily as needed (edema) 30 tablet 1    donepezil (ARICEPT) 5 MG tablet Take 1 tablet by mouth nightly 90 tablet 1    Multiple Vitamins-Minerals (PRESERVISION AREDS 2 PO) Take by mouth daily      metoprolol succinate (TOPROL XL) 25 MG extended release tablet TAKE 1 TABLET EVERY DAY FOR BLOOD PRESSURE 90 tablet 3    losartan (COZAAR) 25 MG tablet Take 1 tablet by mouth daily 90 tablet 3    hydrochlorothiazide (HYDRODIURIL) 25 MG tablet Take 1 tablet by mouth daily 90 tablet 3    clopidogrel (PLAVIX) 75 MG tablet Take 1 tablet by mouth daily 90 tablet 3    atorvastatin (LIPITOR) 20 MG tablet Take one pill daily 90 tablet 3    alendronate (FOSAMAX) 70 MG tablet TAKE 1 TABLET EVERY WEEK 12 tablet 3     No current facility-administered medications for this visit. Allergies   Allergen Reactions    Other      novocain doesn't remember side effects       Health Maintenance   Topic Date Due    Shingles Vaccine (1 of 2) 03/25/1979    Annual Wellness Visit (AWV)  07/06/2019    Flu vaccine (1) 09/01/2019    Potassium monitoring  01/01/2020    Creatinine monitoring  01/01/2020    DTaP/Tdap/Td vaccine (2 - Td) 07/01/2024    Pneumococcal 65+ years Vaccine  Completed       Subjective:     Review of Systems   Constitutional: Negative for chills and fever. Skin:        Tick to left upper back       :Objective      Physical Exam   Constitutional: She is oriented to person, place, and time. She appears well-developed and well-nourished. No distress. HENT:   Right Ear: Decreased hearing is noted. Left Ear: Decreased hearing is noted. Neurological: She is alert and oriented to person, place, and time. Skin: She is not diaphoretic. Nursing note and vitals reviewed.     BP (!) 172/84   Pulse 72   Temp 99.2 °F (37.3 °C) (Temporal)   Resp 18   Wt 124 lb (56.2 kg)   SpO2 97%   BMI 24.22 kg/m²

## 2019-08-04 ASSESSMENT — ENCOUNTER SYMPTOMS
EYE DISCHARGE: 0
SHORTNESS OF BREATH: 0
BACK PAIN: 0
ABDOMINAL DISTENTION: 0
SINUS PRESSURE: 0
ABDOMINAL PAIN: 0
COUGH: 0
EYE REDNESS: 0

## 2019-09-05 ENCOUNTER — OFFICE VISIT (OUTPATIENT)
Dept: INTERNAL MEDICINE | Age: 84
End: 2019-09-05
Payer: MEDICARE

## 2019-09-05 VITALS
BODY MASS INDEX: 22.19 KG/M2 | OXYGEN SATURATION: 98 % | RESPIRATION RATE: 20 BRPM | WEIGHT: 113 LBS | HEIGHT: 60 IN | SYSTOLIC BLOOD PRESSURE: 118 MMHG | DIASTOLIC BLOOD PRESSURE: 72 MMHG | HEART RATE: 121 BPM

## 2019-09-05 DIAGNOSIS — R41.3 MEMORY LOSS: Chronic | ICD-10-CM

## 2019-09-05 DIAGNOSIS — R60.0 LOCALIZED EDEMA: ICD-10-CM

## 2019-09-05 DIAGNOSIS — I87.2 VENOUS STASIS DERMATITIS OF BOTH LOWER EXTREMITIES: Chronic | ICD-10-CM

## 2019-09-05 DIAGNOSIS — I10 ESSENTIAL HYPERTENSION: Primary | ICD-10-CM

## 2019-09-05 PROCEDURE — 1036F TOBACCO NON-USER: CPT | Performed by: INTERNAL MEDICINE

## 2019-09-05 PROCEDURE — G8427 DOCREV CUR MEDS BY ELIG CLIN: HCPCS | Performed by: INTERNAL MEDICINE

## 2019-09-05 PROCEDURE — 99213 OFFICE O/P EST LOW 20 MIN: CPT | Performed by: INTERNAL MEDICINE

## 2019-09-05 PROCEDURE — 1123F ACP DISCUSS/DSCN MKR DOCD: CPT | Performed by: INTERNAL MEDICINE

## 2019-09-05 PROCEDURE — 4040F PNEUMOC VAC/ADMIN/RCVD: CPT | Performed by: INTERNAL MEDICINE

## 2019-09-05 PROCEDURE — G8420 CALC BMI NORM PARAMETERS: HCPCS | Performed by: INTERNAL MEDICINE

## 2019-09-05 PROCEDURE — 1090F PRES/ABSN URINE INCON ASSESS: CPT | Performed by: INTERNAL MEDICINE

## 2019-09-05 PROCEDURE — G8598 ASA/ANTIPLAT THER USED: HCPCS | Performed by: INTERNAL MEDICINE

## 2019-09-05 NOTE — PROGRESS NOTES
Non-medical: Not on file   Tobacco Use    Smoking status: Former Smoker     Last attempt to quit: 1965     Years since quittin.7    Smokeless tobacco: Never Used   Substance and Sexual Activity    Alcohol use: No    Drug use: No    Sexual activity: Never   Lifestyle    Physical activity:     Days per week: Not on file     Minutes per session: Not on file    Stress: Not on file   Relationships    Social connections:     Talks on phone: Not on file     Gets together: Not on file     Attends Mandaeism service: Not on file     Active member of club or organization: Not on file     Attends meetings of clubs or organizations: Not on file     Relationship status: Not on file    Intimate partner violence:     Fear of current or ex partner: Not on file     Emotionally abused: Not on file     Physically abused: Not on file     Forced sexual activity: Not on file   Other Topics Concern    Not on file   Social History Narrative    Not on file       Allergies   Allergen Reactions    Other      novocain doesn't remember side effects       Current Outpatient Medications   Medication Sig Dispense Refill    Apoaequorin (PREVAGEN) 10 MG CAPS Take by mouth      vitamin D (ERGOCALCIFEROL) 74283 units CAPS capsule TAKE ONE CAPSULE BY MOUTH ONCE WEEKLY 12 capsule 3    furosemide (LASIX) 20 MG tablet Take 1 tablet by mouth daily as needed (edema) 30 tablet 1    donepezil (ARICEPT) 5 MG tablet Take 1 tablet by mouth nightly 90 tablet 1    Multiple Vitamins-Minerals (PRESERVISION AREDS 2 PO) Take by mouth daily      metoprolol succinate (TOPROL XL) 25 MG extended release tablet TAKE 1 TABLET EVERY DAY FOR BLOOD PRESSURE 90 tablet 3    losartan (COZAAR) 25 MG tablet Take 1 tablet by mouth daily 90 tablet 3    hydrochlorothiazide (HYDRODIURIL) 25 MG tablet Take 1 tablet by mouth daily 90 tablet 3    clopidogrel (PLAVIX) 75 MG tablet Take 1 tablet by mouth daily 90 tablet 3    atorvastatin (LIPITOR) 20 MG tablet Take

## 2019-09-15 PROBLEM — R60.0 LOCALIZED EDEMA: Status: ACTIVE | Noted: 2019-09-15

## 2019-09-15 ASSESSMENT — ENCOUNTER SYMPTOMS
ABDOMINAL PAIN: 0
COUGH: 0
BACK PAIN: 0
SHORTNESS OF BREATH: 0
EYE REDNESS: 0
SINUS PRESSURE: 0
EYE DISCHARGE: 0
ABDOMINAL DISTENTION: 0

## 2019-09-24 ENCOUNTER — TELEPHONE (OUTPATIENT)
Dept: INTERNAL MEDICINE | Age: 84
End: 2019-09-24

## 2019-09-24 DIAGNOSIS — R41.3 MEMORY LOSS: Primary | Chronic | ICD-10-CM

## 2019-09-24 DIAGNOSIS — R60.0 LOCALIZED EDEMA: ICD-10-CM

## 2019-09-24 DIAGNOSIS — I87.2 VENOUS INSUFFICIENCY: Chronic | ICD-10-CM

## 2019-09-26 ENCOUNTER — TELEPHONE (OUTPATIENT)
Dept: INTERNAL MEDICINE CLINIC | Age: 84
End: 2019-09-26

## 2019-09-27 ENCOUNTER — TELEPHONE (OUTPATIENT)
Dept: INTERNAL MEDICINE CLINIC | Age: 84
End: 2019-09-27

## 2019-09-30 ENCOUNTER — TELEPHONE (OUTPATIENT)
Dept: INTERNAL MEDICINE CLINIC | Age: 84
End: 2019-09-30

## 2019-10-03 ENCOUNTER — TELEPHONE (OUTPATIENT)
Dept: INTERNAL MEDICINE CLINIC | Age: 84
End: 2019-10-03

## 2019-10-03 LAB
BASOPHILS ABSOLUTE: 0.1 K/UL (ref 0–0.2)
BASOPHILS RELATIVE PERCENT: 0.7 % (ref 0–1)
EOSINOPHILS ABSOLUTE: 0.1 K/UL (ref 0–0.6)
EOSINOPHILS RELATIVE PERCENT: 1.8 % (ref 0–5)
HCT VFR BLD CALC: 41.5 % (ref 37–47)
HEMOGLOBIN: 13.9 G/DL (ref 12–16)
IMMATURE GRANULOCYTES #: 0 K/UL
LYMPHOCYTES ABSOLUTE: 1.2 K/UL (ref 1.1–4.5)
LYMPHOCYTES RELATIVE PERCENT: 17.7 % (ref 20–40)
MCH RBC QN AUTO: 29.4 PG (ref 27–31)
MCHC RBC AUTO-ENTMCNC: 33.5 G/DL (ref 33–37)
MCV RBC AUTO: 87.9 FL (ref 81–99)
MONOCYTES ABSOLUTE: 0.5 K/UL (ref 0–0.9)
MONOCYTES RELATIVE PERCENT: 7.9 % (ref 0–10)
NEUTROPHILS ABSOLUTE: 4.9 K/UL (ref 1.5–7.5)
NEUTROPHILS RELATIVE PERCENT: 71.6 % (ref 50–65)
PDW BLD-RTO: 13.2 % (ref 11.5–14.5)
PLATELET # BLD: 207 K/UL (ref 130–400)
PMV BLD AUTO: 9.7 FL (ref 9.4–12.3)
RBC # BLD: 4.72 M/UL (ref 4.2–5.4)
WBC # BLD: 6.8 K/UL (ref 4.8–10.8)

## 2019-10-08 ENCOUNTER — TELEPHONE (OUTPATIENT)
Dept: INTERNAL MEDICINE CLINIC | Age: 84
End: 2019-10-08

## 2019-10-16 ENCOUNTER — TELEPHONE (OUTPATIENT)
Dept: INTERNAL MEDICINE CLINIC | Age: 84
End: 2019-10-16

## 2019-10-23 ENCOUNTER — TELEPHONE (OUTPATIENT)
Dept: INTERNAL MEDICINE CLINIC | Age: 84
End: 2019-10-23

## 2019-11-21 RX ORDER — DONEPEZIL HYDROCHLORIDE 5 MG/1
TABLET, FILM COATED ORAL
Qty: 90 TABLET | Refills: 3 | Status: SHIPPED | OUTPATIENT
Start: 2019-11-21 | End: 2020-12-01

## 2019-11-27 ENCOUNTER — NURSE ONLY (OUTPATIENT)
Dept: INTERNAL MEDICINE | Age: 84
End: 2019-11-27
Payer: MEDICARE

## 2019-11-27 DIAGNOSIS — Z23 FLU VACCINE NEED: Primary | ICD-10-CM

## 2019-11-27 PROCEDURE — 90653 IIV ADJUVANT VACCINE IM: CPT | Performed by: INTERNAL MEDICINE

## 2019-11-27 PROCEDURE — G0008 ADMIN INFLUENZA VIRUS VAC: HCPCS | Performed by: INTERNAL MEDICINE

## 2019-12-12 ENCOUNTER — OFFICE VISIT (OUTPATIENT)
Dept: INTERNAL MEDICINE | Age: 84
End: 2019-12-12
Payer: MEDICARE

## 2019-12-12 VITALS
HEIGHT: 60 IN | HEART RATE: 62 BPM | OXYGEN SATURATION: 92 % | BODY MASS INDEX: 22.58 KG/M2 | WEIGHT: 115 LBS | SYSTOLIC BLOOD PRESSURE: 122 MMHG | DIASTOLIC BLOOD PRESSURE: 70 MMHG
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
## 2019-12-12 DIAGNOSIS — R41.3 MEMORY LOSS: Chronic | ICD-10-CM

## 2019-12-12 PROCEDURE — 4040F PNEUMOC VAC/ADMIN/RCVD: CPT | Performed by: INTERNAL MEDICINE

## 2019-12-12 PROCEDURE — 1090F PRES/ABSN URINE INCON ASSESS: CPT | Performed by: INTERNAL MEDICINE

## 2019-12-12 PROCEDURE — G8427 DOCREV CUR MEDS BY ELIG CLIN: HCPCS | Performed by: INTERNAL MEDICINE

## 2019-12-12 PROCEDURE — 1036F TOBACCO NON-USER: CPT | Performed by: INTERNAL MEDICINE

## 2019-12-12 PROCEDURE — G8420 CALC BMI NORM PARAMETERS: HCPCS | Performed by: INTERNAL MEDICINE

## 2019-12-12 PROCEDURE — 99213 OFFICE O/P EST LOW 20 MIN: CPT | Performed by: INTERNAL MEDICINE

## 2019-12-12 PROCEDURE — G8598 ASA/ANTIPLAT THER USED: HCPCS | Performed by: INTERNAL MEDICINE

## 2019-12-12 PROCEDURE — 1123F ACP DISCUSS/DSCN MKR DOCD: CPT | Performed by: INTERNAL MEDICINE

## 2019-12-12 PROCEDURE — G8482 FLU IMMUNIZE ORDER/ADMIN: HCPCS | Performed by: INTERNAL MEDICINE

## 2019-12-20 ASSESSMENT — ENCOUNTER SYMPTOMS
EYE DISCHARGE: 0
ABDOMINAL DISTENTION: 0
EYE REDNESS: 0
SHORTNESS OF BREATH: 0
ABDOMINAL PAIN: 0
COUGH: 0
BACK PAIN: 1

## 2020-02-27 PROBLEM — Z96.1 PSEUDOPHAKIA: Status: ACTIVE | Noted: 2019-06-27

## 2020-02-28 RX ORDER — HYDROCHLOROTHIAZIDE 25 MG/1
25 TABLET ORAL DAILY
Qty: 90 TABLET | Refills: 3 | Status: SHIPPED | OUTPATIENT
Start: 2020-02-28 | End: 2020-03-03 | Stop reason: SDUPTHER

## 2020-03-03 RX ORDER — HYDROCHLOROTHIAZIDE 25 MG/1
25 TABLET ORAL DAILY
Qty: 90 TABLET | Refills: 3 | Status: SHIPPED | OUTPATIENT
Start: 2020-03-03

## 2020-03-06 ENCOUNTER — OFFICE VISIT (OUTPATIENT)
Dept: INTERNAL MEDICINE | Age: 85
End: 2020-03-06
Payer: MEDICARE

## 2020-03-06 VITALS
HEART RATE: 70 BPM | SYSTOLIC BLOOD PRESSURE: 122 MMHG | WEIGHT: 115 LBS | BODY MASS INDEX: 22.58 KG/M2 | HEIGHT: 60 IN | OXYGEN SATURATION: 93 % | DIASTOLIC BLOOD PRESSURE: 60 MMHG

## 2020-03-06 PROCEDURE — 4040F PNEUMOC VAC/ADMIN/RCVD: CPT | Performed by: INTERNAL MEDICINE

## 2020-03-06 PROCEDURE — 1090F PRES/ABSN URINE INCON ASSESS: CPT | Performed by: INTERNAL MEDICINE

## 2020-03-06 PROCEDURE — G8482 FLU IMMUNIZE ORDER/ADMIN: HCPCS | Performed by: INTERNAL MEDICINE

## 2020-03-06 PROCEDURE — G8420 CALC BMI NORM PARAMETERS: HCPCS | Performed by: INTERNAL MEDICINE

## 2020-03-06 PROCEDURE — 99213 OFFICE O/P EST LOW 20 MIN: CPT | Performed by: INTERNAL MEDICINE

## 2020-03-06 PROCEDURE — G8427 DOCREV CUR MEDS BY ELIG CLIN: HCPCS | Performed by: INTERNAL MEDICINE

## 2020-03-06 PROCEDURE — 1036F TOBACCO NON-USER: CPT | Performed by: INTERNAL MEDICINE

## 2020-03-06 PROCEDURE — 1123F ACP DISCUSS/DSCN MKR DOCD: CPT | Performed by: INTERNAL MEDICINE

## 2020-03-06 ASSESSMENT — PATIENT HEALTH QUESTIONNAIRE - PHQ9
SUM OF ALL RESPONSES TO PHQ QUESTIONS 1-9: 0
SUM OF ALL RESPONSES TO PHQ9 QUESTIONS 1 & 2: 0
2. FEELING DOWN, DEPRESSED OR HOPELESS: 0
SUM OF ALL RESPONSES TO PHQ QUESTIONS 1-9: 0
1. LITTLE INTEREST OR PLEASURE IN DOING THINGS: 0

## 2020-03-06 NOTE — PROGRESS NOTES
Inability: Not on file    Transportation needs     Medical: Not on file     Non-medical: Not on file   Tobacco Use    Smoking status: Former Smoker     Last attempt to quit: 1965     Years since quittin.2    Smokeless tobacco: Never Used   Substance and Sexual Activity    Alcohol use: No    Drug use: No    Sexual activity: Never   Lifestyle    Physical activity     Days per week: Not on file     Minutes per session: Not on file    Stress: Not on file   Relationships    Social connections     Talks on phone: Not on file     Gets together: Not on file     Attends Sabianism service: Not on file     Active member of club or organization: Not on file     Attends meetings of clubs or organizations: Not on file     Relationship status: Not on file    Intimate partner violence     Fear of current or ex partner: Not on file     Emotionally abused: Not on file     Physically abused: Not on file     Forced sexual activity: Not on file   Other Topics Concern    Not on file   Social History Narrative    Not on file       Allergies   Allergen Reactions    Other      novocain doesn't remember side effects       Current Outpatient Medications   Medication Sig Dispense Refill    hydroCHLOROthiazide (HYDRODIURIL) 25 MG tablet Take 1 tablet by mouth daily 90 tablet 3    donepezil (ARICEPT) 5 MG tablet TAKE 1 TABLET EVERY NIGHT 90 tablet 3    Apoaequorin (PREVAGEN) 10 MG CAPS Take by mouth      vitamin D (ERGOCALCIFEROL) 54874 units CAPS capsule TAKE ONE CAPSULE BY MOUTH ONCE WEEKLY 12 capsule 3    furosemide (LASIX) 20 MG tablet Take 1 tablet by mouth daily as needed (edema) 30 tablet 1    Multiple Vitamins-Minerals (PRESERVISION AREDS 2 PO) Take by mouth daily      metoprolol succinate (TOPROL XL) 25 MG extended release tablet TAKE 1 TABLET EVERY DAY FOR BLOOD PRESSURE 90 tablet 3    losartan (COZAAR) 25 MG tablet Take 1 tablet by mouth daily 90 tablet 3    clopidogrel (PLAVIX) 75 MG tablet Take 1 tablet by mouth daily 90 tablet 3    atorvastatin (LIPITOR) 20 MG tablet Take one pill daily 90 tablet 3    alendronate (FOSAMAX) 70 MG tablet TAKE 1 TABLET EVERY WEEK 12 tablet 3     No current facility-administered medications for this visit. Review of Systems   Constitutional: Positive for fatigue. Negative for chills and fever. HENT: Positive for hearing loss. Eyes: Negative for discharge and redness. Respiratory: Negative for cough and shortness of breath. Cardiovascular: Positive for leg swelling. Negative for chest pain and palpitations. Gastrointestinal: Negative for abdominal distention and abdominal pain. Genitourinary: Negative for dysuria. Musculoskeletal: Positive for arthralgias and gait problem. Negative for back pain. Skin: Negative for rash and wound. Neurological: Negative for dizziness and headaches. Psychiatric/Behavioral: Positive for confusion. Negative for dysphoric mood. /60 (Site: Left Upper Arm)   Pulse 70   Ht 5' (1.524 m)   Wt 115 lb (52.2 kg)   SpO2 93%   BMI 22.46 kg/m²   BP Readings from Last 7 Encounters:   03/06/20 122/60   12/12/19 122/70   09/05/19 118/72   07/29/19 (!) 172/84   07/18/19 120/72   02/18/19 132/74   01/11/19 (!) 176/84     Wt Readings from Last 7 Encounters:   03/06/20 115 lb (52.2 kg)   12/12/19 115 lb (52.2 kg)   09/05/19 113 lb (51.3 kg)   07/29/19 124 lb (56.2 kg)   07/18/19 123 lb (55.8 kg)   02/21/19 130 lb (59 kg)   02/18/19 132 lb (59.9 kg)     BMI Readings from Last 7 Encounters:   03/06/20 22.46 kg/m²   12/12/19 22.46 kg/m²   09/05/19 22.07 kg/m²   07/29/19 24.22 kg/m²   07/18/19 24.02 kg/m²   02/21/19 25.39 kg/m²   02/18/19 25.78 kg/m²     Resp Readings from Last 7 Encounters:   09/05/19 20   07/29/19 18   01/11/19 22   07/06/18 22   06/17/18 18   06/17/18 16   06/16/18 18       Physical Exam neck is supple sclera anicteric heart S1 is 2 speech is more difficult than in the past but stable since her stroke.   She has a right cerumen impaction TMs otherwise clear heart S1-S2 lungs clear extremities with pitting edema venous stasis changes no open wounds today. Results for orders placed or performed in visit on 10/03/19   CBC Auto Differential   Result Value Ref Range    WBC 6.8 4.8 - 10.8 K/uL    RBC 4.72 4.20 - 5.40 M/uL    Hemoglobin 13.9 12.0 - 16.0 g/dL    Hematocrit 41.5 37.0 - 47.0 %    MCV 87.9 81.0 - 99.0 fL    MCH 29.4 27.0 - 31.0 pg    MCHC 33.5 33.0 - 37.0 g/dL    RDW 13.2 11.5 - 14.5 %    Platelets 161 630 - 619 K/uL    MPV 9.7 9.4 - 12.3 fL    Neutrophils % 71.6 (H) 50.0 - 65.0 %    Lymphocytes % 17.7 (L) 20.0 - 40.0 %    Monocytes % 7.9 0.0 - 10.0 %    Eosinophils % 1.8 0.0 - 5.0 %    Basophils % 0.7 0.0 - 1.0 %    Neutrophils Absolute 4.9 1.5 - 7.5 K/uL    Immature Granulocytes # 0.0 K/uL    Lymphocytes Absolute 1.2 1.1 - 4.5 K/uL    Monocytes Absolute 0.50 0.00 - 0.90 K/uL    Eosinophils Absolute 0.10 0.00 - 0.60 K/uL    Basophils Absolute 0.10 0.00 - 0.20 K/uL       ASSESSMENT/ PLAN:  1. Memory loss  Patient continues to live alone she has some cognitive changes from the past but able to do her ADLs and manage -she has supportive family she is very independent. 2. Impacted cerumen of right ear    - Ear wax removal    3. Primary osteoarthritis of both knees  Continue the current medical management use a walker at all times a cane when on the soft ground in her yard prefer she would not go up steps or get on ladders. 4. Age-related osteoporosis without current pathological fracture  Continue with Fosamax it sounds like she takes it appropriately    5. Cerebrovascular accident (CVA) due to thrombosis of left middle cerebral artery ([address]  As above watch closely    6. Essential hypertension  Blood pressure stable    7. Localized edema  Stable    8.  Unsteady gait  Urged to use of a walker or some support at all times

## 2020-03-14 PROBLEM — J43.8 OTHER EMPHYSEMA (HCC): Status: RESOLVED | Noted: 2017-12-20 | Resolved: 2020-03-14

## 2020-03-14 PROBLEM — R26.81 UNSTEADY GAIT: Status: ACTIVE | Noted: 2020-03-14

## 2020-03-14 ASSESSMENT — ENCOUNTER SYMPTOMS
EYE DISCHARGE: 0
EYE REDNESS: 0
ABDOMINAL DISTENTION: 0
COUGH: 0
SHORTNESS OF BREATH: 0
ABDOMINAL PAIN: 0
BACK PAIN: 0

## 2020-06-19 ENCOUNTER — OFFICE VISIT (OUTPATIENT)
Dept: URGENT CARE | Age: 85
End: 2020-06-19
Payer: MEDICARE

## 2020-06-19 VITALS
HEART RATE: 98 BPM | SYSTOLIC BLOOD PRESSURE: 156 MMHG | HEIGHT: 63 IN | WEIGHT: 115 LBS | DIASTOLIC BLOOD PRESSURE: 78 MMHG | BODY MASS INDEX: 20.38 KG/M2 | RESPIRATION RATE: 18 BRPM | TEMPERATURE: 97.1 F | OXYGEN SATURATION: 98 %

## 2020-06-19 PROCEDURE — 99213 OFFICE O/P EST LOW 20 MIN: CPT | Performed by: NURSE PRACTITIONER

## 2020-06-19 ASSESSMENT — ENCOUNTER SYMPTOMS: COUGH: 0

## 2020-06-19 NOTE — PROGRESS NOTES
6866               ZVGUKQV Version: 12.5  © 1349-8694 [employer]. Care instructions adapted under license by [facility] [place] ([facility]). If you have questions about a medical condition or this instruction, always ask your healthcare professional. [address] any warranty or liability for your use of this information.                Electronically signed by GISELE Ware CNP on 6/19/2020 at 11:02 AM

## 2020-06-19 NOTE — PATIENT INSTRUCTIONS
Patient Education        Scrapes (Abrasions): Care Instructions  Your Care Instructions  Scrapes (abrasions) are wounds where your skin has been rubbed or torn off. Most scrapes do not go deep into the skin, but some may remove several layers of skin. Scrapes usually don't bleed much, but they may ooze pinkish fluid. Scrapes on the head or face may appear worse than they are. They may bleed a lot because of the good blood supply to this area. Most scrapes heal well and may not need a bandage. They usually heal within 3 to 7 days. A large, deep scrape may take 1 to 2 weeks or longer to heal. A scab may form on some scrapes. Follow-up care is a key part of your treatment and safety. Be sure to make and go to all appointments, and call your doctor if you are having problems. It's also a good idea to know your test results and keep a list of the medicines you take. How can you care for yourself at home? · If your doctor told you how to care for your wound, follow your doctor's instructions. If you did not get instructions, follow this general advice:  ? Wash the scrape with clean water 2 times a day. Don't use hydrogen peroxide or alcohol, which can slow healing. ? You may cover the scrape with a thin layer of petroleum jelly, such as Vaseline, and a nonstick bandage. ? Apply more petroleum jelly and replace the bandage as needed. · Prop up the injured area on a pillow anytime you sit or lie down during the next 3 days. Try to keep it above the level of your heart. This will help reduce swelling. · Be safe with medicines. Take pain medicines exactly as directed. ? If the doctor gave you a prescription medicine for pain, take it as prescribed. ? If you are not taking a prescription pain medicine, ask your doctor if you can take an over-the-counter medicine. When should you call for help?    Call your doctor now or seek immediate medical care if:  · You have signs of infection, such as:  ? Increased pain,

## 2020-07-09 ENCOUNTER — OFFICE VISIT (OUTPATIENT)
Dept: INTERNAL MEDICINE | Age: 85
End: 2020-07-09
Payer: MEDICARE

## 2020-07-09 VITALS
HEIGHT: 60 IN | WEIGHT: 112 LBS | SYSTOLIC BLOOD PRESSURE: 130 MMHG | DIASTOLIC BLOOD PRESSURE: 68 MMHG | OXYGEN SATURATION: 96 % | BODY MASS INDEX: 21.99 KG/M2 | HEART RATE: 78 BPM

## 2020-07-09 PROCEDURE — 4040F PNEUMOC VAC/ADMIN/RCVD: CPT | Performed by: INTERNAL MEDICINE

## 2020-07-09 PROCEDURE — 1123F ACP DISCUSS/DSCN MKR DOCD: CPT | Performed by: INTERNAL MEDICINE

## 2020-07-09 PROCEDURE — G0439 PPPS, SUBSEQ VISIT: HCPCS | Performed by: INTERNAL MEDICINE

## 2020-07-09 ASSESSMENT — PATIENT HEALTH QUESTIONNAIRE - PHQ9
SUM OF ALL RESPONSES TO PHQ9 QUESTIONS 1 & 2: 0
SUM OF ALL RESPONSES TO PHQ QUESTIONS 1-9: 0
SUM OF ALL RESPONSES TO PHQ QUESTIONS 1-9: 0
2. FEELING DOWN, DEPRESSED OR HOPELESS: 0
1. LITTLE INTEREST OR PLEASURE IN DOING THINGS: 0

## 2020-07-09 NOTE — PROGRESS NOTES
Chief Complaint   Patient presents with    Medicare AWV    Hypertension    Memory Loss    Hyperlipidemia       HPI: Patient is here today for Medicare annual wellness visit and to follow-up medical issues dementia with a history of stroke and related to her age she still lives alone she still drives she has family support. She is here to follow-up history of a stroke hypertension and other medical issues. Patient however speaking she suddenly stood up and said she needed to go she had another appointment actually walked her to the car had concern she seemed forgetful got very shaky when she first stood up probably had some orthostasis at that time she walks quickly with a walker walked way out in the heat to her car. There were many cars in the parking lot she was able to locate her car. She denies headache chest pain she denies any recent fall had a fall prior to the last visit when she was out in her yard. Weak fragile she knows me remembers recent interactions.     Past Medical History:   Diagnosis Date    Age-related osteoporosis without current pathological fracture 8/10/2017    Arthritis     BPV (benign positional vertigo)     Collagenous colitis 8/10/2017    Dupuytren's contracture of both hands 3/46/4519    Ehrlichiosis     Hyperlipidemia     Hypertensive crisis     Lumbar disc disease 8/10/2017    Memory loss     Meralgia paraesthetica     Other emphysema ([facility] [address] 12/20/2017    Primary osteoarthritis of knee 8/10/2017    Sensorineural hearing loss     Venous insufficiency     Venous stasis dermatitis of both lower extremities 1/11/2019    Vitamin D deficiency        Past Surgical History:   Procedure Laterality Date    COLONOSCOPY      KNEE SURGERY Bilateral     total knee arthroplasty 11/10    TOE AMPUTATION Right 1/23/2017    TOE AMPUTATION 2ND DIGIT performed by David Hernandez DPM at [facility]       Family History   Problem Relation Age of Onset    Atrial Fibrillation she appeared to be having an orthostatic episode she did not fall and regained her strength and then walked quickly. Patient is alert and oriented to person time and place and other questions I asked her but she seems weak forgetful. Results for orders placed or performed in visit on 10/03/19   CBC Auto Differential   Result Value Ref Range    WBC 6.8 4.8 - 10.8 K/uL    RBC 4.72 4.20 - 5.40 M/uL    Hemoglobin 13.9 12.0 - 16.0 g/dL    Hematocrit 41.5 37.0 - 47.0 %    MCV 87.9 81.0 - 99.0 fL    MCH 29.4 27.0 - 31.0 pg    MCHC 33.5 33.0 - 37.0 g/dL    RDW 13.2 11.5 - 14.5 %    Platelets 532 523 - 505 K/uL    MPV 9.7 9.4 - 12.3 fL    Neutrophils % 71.6 (H) 50.0 - 65.0 %    Lymphocytes % 17.7 (L) 20.0 - 40.0 %    Monocytes % 7.9 0.0 - 10.0 %    Eosinophils % 1.8 0.0 - 5.0 %    Basophils % 0.7 0.0 - 1.0 %    Neutrophils Absolute 4.9 1.5 - 7.5 K/uL    Immature Granulocytes # 0.0 K/uL    Lymphocytes Absolute 1.2 1.1 - 4.5 K/uL    Monocytes Absolute 0.50 0.00 - 0.90 K/uL    Eosinophils Absolute 0.10 0.00 - 0.60 K/uL    Basophils Absolute 0.10 0.00 - 0.20 K/uL       ASSESSMENT/ PLAN:  1. Medicare annual wellness visit, subsequent  Chart medications labs vaccines reviewed patient is very independent she lives alone she has very supportive family but does not have a children of her own she has a sister and nephew and other family who help her. She is very independent resistant to much help her intervention she is able to answer questions make decisions but she is debilitated. 2. Memory loss  Continue with present meds has been on them awhile    3. Venous stasis dermatitis of both lower extremities  Stable and compensated    4. Cerebrovascular accident (CVA) due to thrombosis of left middle cerebral artery ([facility] [address]  History of a stroke but manages -she is using a walker today she says she uses a walker or assistive device. Caution against falling watch closely    5. Unsteady gait  Walker at all times if possible    6. Age-related osteoporosis without current pathological fracture  Continue Fosamax reviewed how to take it it sounds like she is doing this appropriately have caution. Medicare Annual Wellness Visit  Name: Quiana Rivera Date: 2020   MRN: [id number] Sex: Female   Age: 80 y.o. Ethnicity: Non-/Non    : 3/25/1929 Race: Ellyn Mayes is here for Medicare AWV; Hypertension; Memory Loss; and Hyperlipidemia    Screenings for behavioral, psychosocial and functional/safety risks, and cognitive dysfunction are all negative except as indicated below. These results, as well as other patient data from the [address] form, are documented in Flowsheets linked to this Encounter. Allergies   Allergen Reactions    Other      novocain doesn't remember side effects       Prior to Visit Medications    Medication Sig Taking?  Authorizing Provider   hydroCHLOROthiazide (HYDRODIURIL) 25 MG tablet Take 1 tablet by mouth daily  Stephen Lott MD   donepezil (ARICEPT) 5 MG tablet TAKE 1 TABLET EVERY NIGHT  Stephen Lott MD   Apoaequorin (PREVAGEN) 10 MG CAPS Take by mouth  Historical Provider, MD   vitamin D (ERGOCALCIFEROL) 08195 units CAPS capsule TAKE ONE CAPSULE BY MOUTH ONCE WEEKLY  Stephen Lott MD   furosemide (LASIX) 20 MG tablet Take 1 tablet by mouth daily as needed (edema)  Stephen Lott MD   Multiple Vitamins-Minerals (PRESERVISION AREDS 2 PO) Take by mouth daily  Historical Provider, MD   metoprolol succinate (TOPROL XL) 25 MG extended release tablet TAKE 1 TABLET EVERY DAY FOR BLOOD PRESSURE  Lary Lopez MD   losartan (COZAAR) 25 MG tablet Take 1 tablet by mouth daily  Lary Lopez MD   clopidogrel (PLAVIX) 75 MG tablet Take 1 tablet by mouth daily  Lary Lopez MD   atorvastatin (LIPITOR) 20 MG tablet Take one pill daily  Lary Lopez MD   alendronate (FOSAMAX) 70 MG tablet TAKE 1 TABLET EVERY WEEK  Lary Lopez MD       Past Medical History:   Diagnosis Date    Age-related osteoporosis without current pathological fracture 8/10/2017    Arthritis     BPV (benign positional vertigo)     Collagenous colitis 8/10/2017    Dupuytren's contracture of both hands 6/61/7005    Ehrlichiosis     Hyperlipidemia     Hypertensive crisis     Lumbar disc disease 8/10/2017    Memory loss     Meralgia paraesthetica     Other emphysema ([address] 12/20/2017    Primary osteoarthritis of knee 8/10/2017    Sensorineural hearing loss     Venous insufficiency     Venous stasis dermatitis of both lower extremities 1/11/2019    Vitamin D deficiency        Past Surgical History:   Procedure Laterality Date    COLONOSCOPY      KNEE SURGERY Bilateral     total knee arthroplasty 11/10    TOE AMPUTATION Right 1/23/2017    TOE AMPUTATION 2ND DIGIT performed by Kel Martin DPM at [facility]       Family History   Problem Relation Age of Onset    Atrial Fibrillation Sister     Stroke Sister         ischemic    Breast Cancer Sister [de-identified]       CareTeam (Including outside providers/suppliers regularly involved in providing care):   Patient Care Team:  Sultana Paiz MD as PCP - General (Internal Medicine)  Sultana Paiz MD as PCP - [facility] Provider  Grisel Lucero MD as Consulting Physician (Vascular Surgery)    Wt Readings from Last 3 Encounters:   07/09/20 112 lb (50.8 kg)   06/19/20 115 lb (52.2 kg)   03/06/20 115 lb (52.2 kg)     Vitals:    07/09/20 1002   BP: 130/68   Site: Left Upper Arm   Pulse: 78   SpO2: 96%   Weight: 112 lb (50.8 kg)   Height: 5' (1.524 m)     Body mass index is 21.87 kg/m². Based upon direct observation of the patient, evaluation of cognition reveals memory seems intact but patient has a blank stare seems not as cognitively sharp as in the past but she is oriented and alert. Patient's complete Health Risk Assessment and screening values have been reviewed and are found in Flowsheets.  The following problems were reviewed today and where indicated follow up appointments were made and/or referrals ordered. Positive Risk Factor Screenings with Interventions:     Fall Risk:  2 or more falls in past year?: (!) yes  Fall with injury in past year?: (!) yes  Fall Risk Interventions:    · We recommend a walker at all times    Health Habits/Nutrition:  Health Habits/Nutrition  Do you exercise for at least 20 minutes 2-3 times per week?: (!) No  Have you lost any weight without trying in the past 3 months?: No  Do you eat fewer than 2 meals per day?: No  Have you seen a dentist within the past year?: (!) No  Body mass index is 21.87 kg/m². Health Habits/Nutrition Interventions:  · Watch salt intake    ADL:  ADLs  In the past 7 days, did you need help from others to perform any of the following everyday activities? Eating, dressing, grooming, bathing, toileting, or walking/balance?: (!) Walking/Balance  In the past 7 days, did you need help from others to take care of any of the following? Laundry, housekeeping, banking/finances, shopping, telephone use, food preparation, transportation, or taking medications?: [employer], Laundry, Shopping, Food Preparation, Transportation  ADL Interventions:  · Patient would be better to be living with someone or having more assistance she is extremely independent and declines says she has supportive family that check on her routinely patient knows we will wish she had more continuous support. Very independent.     Personalized Preventive Plan   Current Health Maintenance Status  Immunization History   Administered Date(s) Administered    Influenza, High Dose (Fluzone 65 yrs and older) 10/04/2016, 11/07/2017, 10/08/2018    Influenza, Triv, inactivated, subunit, adjuvanted, IM (Fluad 65 yrs and older) 11/27/2019    Pneumococcal Conjugate 13-valent ([id number]) 01/04/2016    Pneumococcal Polysaccharide ([id number]) 10/20/2008    Tdap (Boostrix, Adacel) 07/01/2014        Health Maintenance   Topic Date Due    Shingles Vaccine (1 of 2) 03/25/1979    Annual Wellness Visit (AWV)  07/18/2020    Flu vaccine (1) 09/01/2020    Lipid screen  06/27/2021    Potassium monitoring  06/27/2021    Creatinine monitoring  06/27/2021    DTaP/Tdap/Td vaccine (2 - Td) 07/01/2024    Pneumococcal 65+ years Vaccine  Completed    Hepatitis A vaccine  Aged Out    Hepatitis B vaccine  Aged Out    Hib vaccine  Aged Out    Meningococcal (ACWY) vaccine  Aged Out     Recommendations for "[employer] [place]" Due: see orders and patient instructions/AVS.  . Recommended screening schedule for the next 5-10 years is provided to the patient in written form: see Patient Instructions/AVS.    Carmen Hurst was seen today for medicare awv, hypertension, memory loss and hyperlipidemia.     Diagnoses and all orders for this visit:    Medicare annual wellness visit, subsequent    Memory loss    Venous stasis dermatitis of both lower extremities    Cerebrovascular accident (CVA) due to thrombosis of left middle cerebral artery ([address]    Unsteady gait    Age-related osteoporosis without current pathological fracture

## 2020-07-18 ASSESSMENT — ENCOUNTER SYMPTOMS
ABDOMINAL DISTENTION: 0
EYE REDNESS: 0
BACK PAIN: 0
ABDOMINAL PAIN: 0
EYE DISCHARGE: 0
SHORTNESS OF BREATH: 0
COUGH: 0

## 2020-12-01 RX ORDER — DONEPEZIL HYDROCHLORIDE 5 MG/1
TABLET, FILM COATED ORAL
Qty: 90 TABLET | Refills: 3 | Status: SHIPPED | OUTPATIENT
Start: 2020-12-01

## 2021-01-11 ENCOUNTER — OFFICE VISIT (OUTPATIENT)
Dept: INTERNAL MEDICINE | Age: 86
End: 2021-01-11
Payer: MEDICARE

## 2021-01-11 VITALS
WEIGHT: 109 LBS | HEART RATE: 64 BPM | SYSTOLIC BLOOD PRESSURE: 124 MMHG | DIASTOLIC BLOOD PRESSURE: 70 MMHG | BODY MASS INDEX: 21.4 KG/M2 | HEIGHT: 60 IN

## 2021-01-11 DIAGNOSIS — E78.2 MIXED HYPERLIPIDEMIA: ICD-10-CM

## 2021-01-11 DIAGNOSIS — I63.312 CEREBROVASCULAR ACCIDENT (CVA) DUE TO THROMBOSIS OF LEFT MIDDLE CEREBRAL ARTERY (HCC): Primary | ICD-10-CM

## 2021-01-11 DIAGNOSIS — Z91.81 AT HIGH RISK FOR FALLS: ICD-10-CM

## 2021-01-11 DIAGNOSIS — I10 ESSENTIAL HYPERTENSION: ICD-10-CM

## 2021-01-11 DIAGNOSIS — M81.0 AGE-RELATED OSTEOPOROSIS WITHOUT CURRENT PATHOLOGICAL FRACTURE: Chronic | ICD-10-CM

## 2021-01-11 DIAGNOSIS — M17.0 PRIMARY OSTEOARTHRITIS OF BOTH KNEES: Chronic | ICD-10-CM

## 2021-01-11 PROBLEM — F02.80 DEMENTIA ASSOCIATED WITH OTHER UNDERLYING DISEASE WITHOUT BEHAVIORAL DISTURBANCE (HCC): Status: ACTIVE | Noted: 2017-12-19

## 2021-01-11 PROCEDURE — 1036F TOBACCO NON-USER: CPT | Performed by: INTERNAL MEDICINE

## 2021-01-11 PROCEDURE — G8482 FLU IMMUNIZE ORDER/ADMIN: HCPCS | Performed by: INTERNAL MEDICINE

## 2021-01-11 PROCEDURE — 1090F PRES/ABSN URINE INCON ASSESS: CPT | Performed by: INTERNAL MEDICINE

## 2021-01-11 PROCEDURE — 99213 OFFICE O/P EST LOW 20 MIN: CPT | Performed by: INTERNAL MEDICINE

## 2021-01-11 PROCEDURE — 4040F PNEUMOC VAC/ADMIN/RCVD: CPT | Performed by: INTERNAL MEDICINE

## 2021-01-11 PROCEDURE — 1123F ACP DISCUSS/DSCN MKR DOCD: CPT | Performed by: INTERNAL MEDICINE

## 2021-01-11 PROCEDURE — G8420 CALC BMI NORM PARAMETERS: HCPCS | Performed by: INTERNAL MEDICINE

## 2021-01-11 PROCEDURE — G8427 DOCREV CUR MEDS BY ELIG CLIN: HCPCS | Performed by: INTERNAL MEDICINE

## 2021-01-11 SDOH — ECONOMIC STABILITY: TRANSPORTATION INSECURITY
IN THE PAST 12 MONTHS, HAS THE LACK OF TRANSPORTATION KEPT YOU FROM MEDICAL APPOINTMENTS OR FROM GETTING MEDICATIONS?: NOT ASKED

## 2021-01-11 ASSESSMENT — ENCOUNTER SYMPTOMS
SHORTNESS OF BREATH: 0
BACK PAIN: 0
COUGH: 0
EYE REDNESS: 0
EYE DISCHARGE: 0
ABDOMINAL PAIN: 0
ABDOMINAL DISTENTION: 0

## 2021-01-11 NOTE — PROGRESS NOTES
Chief Complaint   Patient presents with    3 Month Follow-Up    Dementia    Hypertension       HPI: Patient is here today to follow-up her history of stroke hypertension other medical problems. She has a bit of an unsteady shuffling gait we encouraged her to use her cane or a walker. She feels okay no specific complaints other than hand pain knee pain.     Past Medical History:   Diagnosis Date    Age-related osteoporosis without current pathological fracture 8/10/2017    Arthritis     BPV (benign positional vertigo)     Collagenous colitis 8/10/2017    Dupuytren's contracture of both hands 1830    Ehrlichiosis     Hyperlipidemia     Hypertensive crisis     Lumbar disc disease 8/10/2017    Memory loss     Meralgia paraesthetica     Other emphysema ([facility] [address] 2017    Primary osteoarthritis of knee 8/10/2017    Sensorineural hearing loss     Venous insufficiency     Venous stasis dermatitis of both lower extremities 2019    Vitamin D deficiency        Past Surgical History:   Procedure Laterality Date    COLONOSCOPY      KNEE SURGERY Bilateral     total knee arthroplasty 11/10    TOE AMPUTATION Right 2017    TOE AMPUTATION 2ND DIGIT performed by Stephane Rogers DPM at [facility] OR       Family History   Problem Relation Age of Onset    Atrial Fibrillation Sister     Stroke Sister         ischemic    Breast Cancer Sister [de-identified]       Social History     Socioeconomic History    Marital status: Single     Spouse name: Not on file    Number of children: 0    Years of education: 15    Highest education level: Not on file   Occupational History    Occupation: retired   Social Needs    Financial resource strain: Not very hard    Food insecurity     Worry: Never true     Inability: Never true    Transportation needs     Medical: Not on file     Non-medical: Not on file   Tobacco Use    Smoking status: Former Smoker     Quit date:      Years since quittin.0    Smokeless tobacco: Never Used   Substance and Sexual Activity    Alcohol use: No    Drug use: No    Sexual activity: Never   Lifestyle    Physical activity     Days per week: Not on file     Minutes per session: Not on file    Stress: Not on file   Relationships    Social connections     Talks on phone: Not on file     Gets together: Not on file     Attends Religion service: Not on file     Active member of club or organization: Not on file     Attends meetings of clubs or organizations: Not on file     Relationship status: Not on file    Intimate partner violence     Fear of current or ex partner: Not on file     Emotionally abused: Not on file     Physically abused: Not on file     Forced sexual activity: Not on file   Other Topics Concern    Not on file   Social History Narrative    Not on file       Allergies   Allergen Reactions    Other      novocain doesn't remember side effects       Current Outpatient Medications   Medication Sig Dispense Refill    diclofenac sodium (VOLTAREN) 1 % GEL Apply 4 g topically 4 times daily as needed for Pain 150 g 3    donepezil (ARICEPT) 5 MG tablet TAKE 1 TABLET EVERY NIGHT 90 tablet 3    hydroCHLOROthiazide (HYDRODIURIL) 25 MG tablet Take 1 tablet by mouth daily 90 tablet 3    Apoaequorin (PREVAGEN) 10 MG CAPS Take by mouth      vitamin D (ERGOCALCIFEROL) 19155 units CAPS capsule TAKE ONE CAPSULE BY MOUTH ONCE WEEKLY 12 capsule 3    furosemide (LASIX) 20 MG tablet Take 1 tablet by mouth daily as needed (edema) 30 tablet 1    Multiple Vitamins-Minerals (PRESERVISION AREDS 2 PO) Take by mouth daily      metoprolol succinate (TOPROL XL) 25 MG extended release tablet TAKE 1 TABLET EVERY DAY FOR BLOOD PRESSURE 90 tablet 3    losartan (COZAAR) 25 MG tablet Take 1 tablet by mouth daily 90 tablet 3    clopidogrel (PLAVIX) 75 MG tablet Take 1 tablet by mouth daily 90 tablet 3    atorvastatin (LIPITOR) 20 MG tablet Take one pill daily 90 tablet 3    alendronate (FOSAMAX) 70 MG tablet TAKE 1 TABLET EVERY WEEK 12 tablet 3     No current facility-administered medications for this visit. Review of Systems   Constitutional: Positive for fatigue. Negative for chills and fever. HENT: Positive for hearing loss. Eyes: Negative for discharge and redness. Respiratory: Negative for cough and shortness of breath. Cardiovascular: Positive for leg swelling. Negative for chest pain and palpitations. Gastrointestinal: Negative for abdominal distention and abdominal pain. Genitourinary: Negative for dysuria. Musculoskeletal: Positive for arthralgias and gait problem. Negative for back pain. Skin: Negative for rash and wound. Neurological: Positive for weakness. Negative for dizziness and headaches. Psychiatric/Behavioral: Positive for confusion. Negative for dysphoric mood. /70   Pulse 64   Ht 5' (1.524 m)   Wt 109 lb (49.4 kg)   BMI 21.29 kg/m²   BP Readings from Last 7 Encounters:   01/11/21 124/70   07/09/20 130/68   06/19/20 (!) 156/78   03/06/20 122/60   12/12/19 122/70   09/05/19 118/72   07/29/19 (!) 172/84     Wt Readings from Last 7 Encounters:   01/11/21 109 lb (49.4 kg)   07/09/20 112 lb (50.8 kg)   06/19/20 115 lb (52.2 kg)   03/06/20 115 lb (52.2 kg)   12/12/19 115 lb (52.2 kg)   09/05/19 113 lb (51.3 kg)   07/29/19 124 lb (56.2 kg)     BMI Readings from Last 7 Encounters:   01/11/21 21.29 kg/m²   07/09/20 21.87 kg/m²   06/19/20 20.37 kg/m²   03/06/20 22.46 kg/m²   12/12/19 22.46 kg/m²   09/05/19 22.07 kg/m²   07/29/19 24.22 kg/m²     Resp Readings from Last 7 Encounters:   06/19/20 18   09/05/19 20   07/29/19 18   01/11/19 22   07/06/18 22   06/17/18 18   06/17/18 16       Physical Exam  Constitutional:       General: She is not in acute distress. HENT:      Head: Normocephalic. Eyes:      General: No scleral icterus. Neck:      Musculoskeletal: Neck supple. Cardiovascular:      Heart sounds: Normal heart sounds.    Pulmonary: Breath sounds: Normal breath sounds. Musculoskeletal:      Right lower leg: Edema present. Left lower leg: Edema present. Lymphadenopathy:      Cervical: No cervical adenopathy. Skin:     Findings: No rash. Comments: Chronic arthritis changes lower extremity edema venous stasis dermatitis changes   Neurological:      Mental Status: Mental status is at baseline. Psychiatric:         Mood and Affect: Mood normal.         Results for orders placed or performed in visit on 10/03/19   CBC Auto Differential   Result Value Ref Range    WBC 6.8 4.8 - 10.8 K/uL    RBC 4.72 4.20 - 5.40 M/uL    Hemoglobin 13.9 12.0 - 16.0 g/dL    Hematocrit 41.5 37.0 - 47.0 %    MCV 87.9 81.0 - 99.0 fL    MCH 29.4 27.0 - 31.0 pg    MCHC 33.5 33.0 - 37.0 g/dL    RDW 13.2 11.5 - 14.5 %    Platelets 155 602 - 373 K/uL    MPV 9.7 9.4 - 12.3 fL    Neutrophils % 71.6 (H) 50.0 - 65.0 %    Lymphocytes % 17.7 (L) 20.0 - 40.0 %    Monocytes % 7.9 0.0 - 10.0 %    Eosinophils % 1.8 0.0 - 5.0 %    Basophils % 0.7 0.0 - 1.0 %    Neutrophils Absolute 4.9 1.5 - 7.5 K/uL    Immature Granulocytes # 0.0 K/uL    Lymphocytes Absolute 1.2 1.1 - 4.5 K/uL    Monocytes Absolute 0.50 0.00 - 0.90 K/uL    Eosinophils Absolute 0.10 0.00 - 0.60 K/uL    Basophils Absolute 0.10 0.00 - 0.20 K/uL       ASSESSMENT/ PLAN:  1. Cerebrovascular accident (CVA) due to thrombosis of left middle cerebral artery [facility])  Patient is 80 should be 80 soon she lives alone she has a very supportive sister who is elderly also. Patient is very independent really does not accept offers of help she is managing we encouraged her routinely to have great caution with walking caution against falling she has been compliant with her medications since her stroke risk benefit such as bleeding from Plavix weighed but she has maintained and done well with this present medical regimen    2.  Primary osteoarthritis of both knees  She cannot take NSAIDs but we wrote for Voltaren gel she

## 2021-02-15 ENCOUNTER — CARE COORDINATION (OUTPATIENT)
Dept: CARE COORDINATION | Age: 86
End: 2021-02-15

## 2021-02-19 NOTE — CARE COORDINATION
ACM was unable to speak with [employer] this week - I made 2 attempts to call, and pt did not answer and does not have voicemail. AC called patient's other contact, Too Francisco. ACM identified myself and asked if Too Francisco would let patient know I am trying to contact her - no emergency but would like to talk to pt. Too Francisco voiced understanding. Too Francisco (pt's sister) said pt's memory is not good and she doesn't know if it is affecting patient's self-care. She said [employer] won't let anyone help her much. Pt does have homemaker assistance 2 days per week - someone who worked in a nursing home before. Too Francisco thinks patient is eating ok. Too Francisco stated patient does not answer her phone and does not have voicemail. She stated patient does not answer phone when Too Francisco calls her. Patient will call Too Francisco every morning and let her know she's OK and that's about it. AC will attempt to reach patient again next week.    Electronically signed by Zohra Neely RN on 2/19/2021 at 1:02 PM

## 2021-02-23 ENCOUNTER — CARE COORDINATION (OUTPATIENT)
Dept: CARE COORDINATION | Age: 86
End: 2021-02-23

## 2021-02-23 NOTE — CARE COORDINATION
ACM made attempt again this week to call patient and offer support through care management. Pt did not answer and there is no voicemail set up. Pt's sister informed this ACM that patient does not answer her phone and will not answer sister's calls. Due to lack of contact over past 2 weeks, will screen patient out at this time. If she calls for support, will initiate care management enrollment as indicated.   Electronically signed by Peg Portillo RN on 2/23/2021 at 3:27 PM

## 2021-03-07 ENCOUNTER — IMMUNIZATION (OUTPATIENT)
Age: 86
End: 2021-03-07
Payer: MEDICARE

## 2021-03-07 PROCEDURE — 91300 COVID-19, PFIZER VACCINE 30MCG/0.3ML DOSE: CPT | Performed by: FAMILY MEDICINE

## 2021-03-07 PROCEDURE — 0001A PR IMM ADMN SARSCOV2 30MCG/0.3ML DIL RECON 1ST DOSE: CPT | Performed by: FAMILY MEDICINE

## 2021-03-19 ENCOUNTER — OFFICE VISIT (OUTPATIENT)
Dept: URGENT CARE | Age: 86
End: 2021-03-19
Payer: MEDICARE

## 2021-03-19 VITALS
SYSTOLIC BLOOD PRESSURE: 150 MMHG | DIASTOLIC BLOOD PRESSURE: 90 MMHG | WEIGHT: 103 LBS | HEART RATE: 114 BPM | TEMPERATURE: 97.3 F | BODY MASS INDEX: 20.12 KG/M2

## 2021-03-19 DIAGNOSIS — Z23 NEED FOR TDAP VACCINATION: ICD-10-CM

## 2021-03-19 DIAGNOSIS — M25.421 ELBOW SWELLING, RIGHT: ICD-10-CM

## 2021-03-19 DIAGNOSIS — S41.111A SKIN TEAR OF RIGHT UPPER ARM WITHOUT COMPLICATION, INITIAL ENCOUNTER: Primary | ICD-10-CM
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
## 2021-03-19 PROCEDURE — 99214 OFFICE O/P EST MOD 30 MIN: CPT | Performed by: NURSE PRACTITIONER

## 2021-03-19 PROCEDURE — 1123F ACP DISCUSS/DSCN MKR DOCD: CPT | Performed by: NURSE PRACTITIONER

## 2021-03-19 PROCEDURE — G8420 CALC BMI NORM PARAMETERS: HCPCS | Performed by: NURSE PRACTITIONER

## 2021-03-19 PROCEDURE — 1090F PRES/ABSN URINE INCON ASSESS: CPT | Performed by: NURSE PRACTITIONER

## 2021-03-19 PROCEDURE — G8427 DOCREV CUR MEDS BY ELIG CLIN: HCPCS | Performed by: NURSE PRACTITIONER

## 2021-03-19 PROCEDURE — G8482 FLU IMMUNIZE ORDER/ADMIN: HCPCS | Performed by: NURSE PRACTITIONER

## 2021-03-19 PROCEDURE — 4040F PNEUMOC VAC/ADMIN/RCVD: CPT | Performed by: NURSE PRACTITIONER

## 2021-03-19 PROCEDURE — 1036F TOBACCO NON-USER: CPT | Performed by: NURSE PRACTITIONER

## 2021-03-19 RX ORDER — CEPHALEXIN 500 MG/1
500 CAPSULE ORAL 2 TIMES DAILY
Qty: 14 CAPSULE | Refills: 0 | Status: SHIPPED | OUTPATIENT
Start: 2021-03-19 | End: 2021-03-26

## 2021-03-19 ASSESSMENT — ENCOUNTER SYMPTOMS: COLOR CHANGE: 0

## 2021-03-19 NOTE — Clinical Note
Diamond,    I saw Ms. Christen Mayer today. Please see my note. She refused most of the care I suggested. I am unsure of her cognitive function baseline but she answered me appropriately. She basically just wanted me to put a dressing on her arm, but I felt she warranted further work up. Just wanted to let you know.      Thanks

## 2021-03-19 NOTE — PROGRESS NOTES
[address] [place] [address]   [address], [id number]     Phone:  [phone]  Fax:  [phone]      Gonsalo Disla is a 80 y.o. female who presents today for her medical conditions/complaints as noted below. Gonsalo Disla is c/o of Bleeding/Bruising (pt states fell yesterday on her right side and bruised her right arm)      Chief Complaint   Patient presents with    Bleeding/Bruising     pt states fell yesterday on her right side and bruised her right arm       HPI:     HPI    Gonsalo Disla presents today for fall around 5pm last night. She states she fell on concrete at her home and bruised her right upper arm. She denies syncope or hitting her head. She states she tripped outside. She fell on her right arm/elbow. She can straighten elbow. She states the area only bled slightly then stopped. She is on Plavix. She does have pain in this area. She mentions she is here because her  came this morning and suggested she come get this area dressed. She does live alone. Her last Tdap was in 2014 according to records. She is unsure. She is oriented to self, place, and time. She denies n/t to arm. She is a poor historian, but her sister is present, but she was not present when fall occurred.      Past Medical History:   Diagnosis Date    Age-related osteoporosis without current pathological fracture 8/10/2017    Arthritis     BPV (benign positional vertigo)     Collagenous colitis 8/10/2017    Dupuytren's contracture of both hands 0/55/8977    Ehrlichiosis     Hyperlipidemia     Hypertensive crisis     Lumbar disc disease 8/10/2017    Memory loss     Meralgia paraesthetica     Other emphysema ([facility] [address] 12/20/2017    Primary osteoarthritis of knee 8/10/2017    Sensorineural hearing loss     Venous insufficiency     Venous stasis dermatitis of both lower extremities 1/11/2019    Vitamin D deficiency         Past Surgical History:   Procedure Laterality Date    COLONOSCOPY      KNEE SURGERY Bilateral     total knee arthroplasty 11/10    TOE AMPUTATION Right 2017    TOE AMPUTATION 2ND DIGIT performed by Rajesh Connor DPM at [address] [facility] OR       Social History     Tobacco Use    Smoking status: Former Smoker     Quit date: 1965     Years since quittin.3    Smokeless tobacco: Never Used   Substance Use Topics    Alcohol use: No        Current Outpatient Medications   Medication Sig Dispense Refill    mupirocin (BACTROBAN) 2 % ointment Apply 3 times daily. 1 Tube 0    cephALEXin (KEFLEX) 500 MG capsule Take 1 capsule by mouth 2 times daily for 7 days 14 capsule 0    diclofenac sodium (VOLTAREN) 1 % GEL Apply 4 g topically 4 times daily as needed for Pain 150 g 3    donepezil (ARICEPT) 5 MG tablet TAKE 1 TABLET EVERY NIGHT 90 tablet 3    hydroCHLOROthiazide (HYDRODIURIL) 25 MG tablet Take 1 tablet by mouth daily 90 tablet 3    Apoaequorin (PREVAGEN) 10 MG CAPS Take by mouth      vitamin D (ERGOCALCIFEROL) 10570 units CAPS capsule TAKE ONE CAPSULE BY MOUTH ONCE WEEKLY 12 capsule 3    furosemide (LASIX) 20 MG tablet Take 1 tablet by mouth daily as needed (edema) 30 tablet 1    Multiple Vitamins-Minerals (PRESERVISION AREDS 2 PO) Take by mouth daily      metoprolol succinate (TOPROL XL) 25 MG extended release tablet TAKE 1 TABLET EVERY DAY FOR BLOOD PRESSURE 90 tablet 3    losartan (COZAAR) 25 MG tablet Take 1 tablet by mouth daily 90 tablet 3    clopidogrel (PLAVIX) 75 MG tablet Take 1 tablet by mouth daily 90 tablet 3    atorvastatin (LIPITOR) 20 MG tablet Take one pill daily 90 tablet 3    alendronate (FOSAMAX) 70 MG tablet TAKE 1 TABLET EVERY WEEK 12 tablet 3     No current facility-administered medications for this visit.         Allergies   Allergen Reactions    Other      novocain doesn't remember side effects       Family History   Problem Relation Age of Onset    Atrial Fibrillation Sister     Stroke Sister         ischemic    Breast Cancer Sister [de-identified] Review of Systems   Constitutional: Negative for fever. Musculoskeletal: Positive for arthralgias. Skin: Positive for wound. Negative for color change, pallor and rash. Neurological: Negative for numbness. Objective:     Physical Exam  Vitals signs and nursing note reviewed. Constitutional:       General: She is not in acute distress. Appearance: Normal appearance. She is not ill-appearing, toxic-appearing or diaphoretic. HENT:      Head: Normocephalic and atraumatic. Right Ear: External ear normal.      Left Ear: External ear normal.   Eyes:      General:         Right eye: No discharge. Left eye: No discharge. Neck:      Musculoskeletal: Normal range of motion and neck supple. Cardiovascular:      Rate and Rhythm: Tachycardia present. Pulmonary:      Effort: Pulmonary effort is normal. No respiratory distress. Breath sounds: Normal breath sounds. No wheezing or rhonchi. Musculoskeletal:         General: Swelling and tenderness present. Skin:     Capillary Refill: Capillary refill takes less than 2 seconds. Findings: Lesion present. Neurological:      Mental Status: She is alert and oriented to person, place, and time. Motor: Weakness present. Psychiatric:         Behavior: Behavior is agitated. Cognition and Memory: Cognition is impaired. BP (!) 150/90   Pulse 114   Temp 97.3 °F (36.3 °C)   Wt 103 lb (46.7 kg)   BMI 20.12 kg/m²     Assessment:      Diagnosis Orders   1. Skin tear of right upper arm without complication, initial encounter  mupirocin (BACTROBAN) 2 % ointment    cephALEXin (KEFLEX) 500 MG capsule   2. Need for Tdap vaccination     3. Fall, initial encounter     4. Elbow swelling, right         No results found for this visit on 03/19/21. Plan:     Patient declined to get elbow xray. I am concerned for possible foreign body and possible fracture due to pain. She states \"I'm 80years old.  I don't even get xrays at the dentist.\" I explained to her in detail the reasons for getting xrays and she refused. I brought her sister, Osman Perea, to speak with myself and patient. Sister agreed on xray. Patient still refused. After long discussion, she decided she would get the xray and get the Tdap vaccine. Her right arm wound was cleaned and dressed in the office with bactroban and non adhesive gauze and wrapped with kerlex. MA took her to xray 10 minutes later and patient refused to get xray while in radiology department. I spoke with her again upon returning to urgent care and discussed possible fracture and foreign body. She has refused. She has also refused the tdap vaccine at this point. She was given her after visit summary and she yelled at staff that she didn't know what to do with the papers. I have discussed with her treatment and dressing of wound along with taking Keflex. She has previous agreed to this and was calm while in exam room. She did state she only wanted me to dress the wound when she arrived, but had since agreed to treatment. She continued to yell at staff and providers while crumbling papers stating \" I'm scared to take pills. \" Her sister is present and taking her home. She does live at home alone. A  and her sister visit often from what I am told. She also was able to answer all questions appropriately at beginning of appointment. Sister and patient state she has no POA. Her sister that is with her is her next of kin. I did still recommend keflex and bactroban to wound with dressing changes daily. She needs to follow up with PCP as soon as possible. Return if symptoms worsen or fail to improve, for PCP as soon as possible. .    No orders of the defined types were placed in this encounter. Orders Placed This Encounter   Medications    mupirocin (BACTROBAN) 2 % ointment     Sig: Apply 3 times daily.      Dispense:  1 Tube     Refill:  0    cephALEXin (KEFLEX) 500 MG capsule     Sig: Take 1 capsule by mouth 2 times daily for 7 days     Dispense:  14 capsule     Refill:  0        Patient offered educational materials - see patient instructions for any instruction needed. Discussed use, benefit, and side effects of prescribed medications. All patient questions answered. Instructed to continue current medications, diet and exercise. Patient agreed with treatment plan. Follow up as directed. Patient was advised to go to the ED if condition ever becomes emergent.        Electronically signed by Ritu Ricketts on 3/19/2021 at 1:12 PM

## 2021-03-19 NOTE — PATIENT INSTRUCTIONS
wash the skin tear with plain water 2 times a day. Do not rub the area. · Let the area air dry. Or you can pat it carefully with a soft towel. When should you call for help? Call your doctor now or seek immediate medical care if:    · You have signs of infection, such as:  ? Increased pain, swelling, warmth, or redness around the tear. ? Red streaks leading from the tear. ? Pus draining from the tear. ? A fever.     · The tear starts to bleed a lot. Small amounts of blood are normal.   Watch closely for changes in your health, and be sure to contact your doctor if:    · You do not get better as expected. Where can you learn more? Go to [email or web address]://[email or web address] org and sign in to your [employer] account. Enter K335 in the [employer] box to learn more about \"Skin Tears: Care Instructions. \"     If you do not have an account, please click on the \"Sign Up Now\" link. Current as of: February 26, 2020               Content Version: 12.8  © 2006-2021 [employer]wise, Incorporated. Care instructions adapted under license by [facility] ([facility]). If you have questions about a medical condition or this instruction, always ask your healthcare professional. [place] [address] any warranty or liability for your use of this information.

## 2021-03-20 ENCOUNTER — TELEPHONE (OUTPATIENT)
Dept: URGENT CARE | Age: 86
End: 2021-03-20

## 2021-03-20 NOTE — TELEPHONE ENCOUNTER
Received voicemail from patient stating \"MY PHONE IS OUT OF ORDER. MY PHONE IS OUT OF ORDER. MY PHONE IS OUT OF ORDER. My name is Felice Reinoso. I was there yesterday. Thanks. \" Patient did not state a reason for calling otherwise. No one from this office has called her. I have attempted to call patient and get a busy signal. I have attempted to call contact, Juan Jose Lamont, and received a male that stated she was not available. Will try again at a later time and call welfare check if not able to reach patient or next of kin.

## 2021-03-20 NOTE — TELEPHONE ENCOUNTER
Per Sumanth Manley, pt lm on nurse line stating that she was seen at Urgent Care yesterday, but did not leave any further details. Per Petra Patiño, pt has dementia and she is concerned pt may need assistance. I called pt's sister, she states she checked on pt earlier today and she was in stable condition and did not appear to be in any distress. She states that pt will not answer her phone and she checks on her rountinely.

## 2021-03-22 ENCOUNTER — TELEPHONE (OUTPATIENT)
Dept: URGENT CARE | Age: 86
End: 2021-03-22

## 2021-03-22 NOTE — TELEPHONE ENCOUNTER
Pt called and left voicemail message asking how often to change dressing for skin tear on er right arm. I called pt, no answer, no voicemail. I called her sister who is listed on [employer] form and is her caretaker. I advised sister a clean, dry, non stick dressing should be applied to wound  at least once a day and should take antibiotic prescribed. Informed sister that pt needs a f/u appt with pcp as well. Sister states that she will call pcp and schedule f/u tomorrow.

## 2021-03-28 ENCOUNTER — IMMUNIZATION (OUTPATIENT)
Age: 86
End: 2021-03-28
Payer: MEDICARE

## 2021-03-28 PROCEDURE — 0002A COVID-19, PFIZER VACCINE 30MCG/0.3ML DOSE: CPT | Performed by: FAMILY MEDICINE

## 2021-03-28 PROCEDURE — 91300 COVID-19, PFIZER VACCINE 30MCG/0.3ML DOSE: CPT | Performed by: FAMILY MEDICINE

## 2021-04-06 ENCOUNTER — OFFICE VISIT (OUTPATIENT)
Dept: INTERNAL MEDICINE | Age: 86
End: 2021-04-06
Payer: MEDICARE

## 2021-04-06 VITALS — SYSTOLIC BLOOD PRESSURE: 132 MMHG | DIASTOLIC BLOOD PRESSURE: 80 MMHG

## 2021-04-06 DIAGNOSIS — F02.80 DEMENTIA ASSOCIATED WITH OTHER UNDERLYING DISEASE WITHOUT BEHAVIORAL DISTURBANCE (HCC): ICD-10-CM

## 2021-04-06 DIAGNOSIS — I63.312 CEREBROVASCULAR ACCIDENT (CVA) DUE TO THROMBOSIS OF LEFT MIDDLE CEREBRAL ARTERY (HCC): ICD-10-CM

## 2021-04-06 DIAGNOSIS — R26.81 UNSTEADY GAIT: ICD-10-CM

## 2021-04-06 DIAGNOSIS — S41.111S SKIN TEAR OF RIGHT UPPER ARM WITHOUT COMPLICATION, SEQUELA: Primary | ICD-10-CM

## 2021-04-06 PROCEDURE — 1123F ACP DISCUSS/DSCN MKR DOCD: CPT | Performed by: NURSE PRACTITIONER

## 2021-04-06 PROCEDURE — 1036F TOBACCO NON-USER: CPT | Performed by: NURSE PRACTITIONER

## 2021-04-06 PROCEDURE — G8420 CALC BMI NORM PARAMETERS: HCPCS | Performed by: NURSE PRACTITIONER

## 2021-04-06 PROCEDURE — 99212 OFFICE O/P EST SF 10 MIN: CPT | Performed by: NURSE PRACTITIONER

## 2021-04-06 PROCEDURE — 4040F PNEUMOC VAC/ADMIN/RCVD: CPT | Performed by: NURSE PRACTITIONER

## 2021-04-06 PROCEDURE — 1090F PRES/ABSN URINE INCON ASSESS: CPT | Performed by: NURSE PRACTITIONER

## 2021-04-06 PROCEDURE — G8427 DOCREV CUR MEDS BY ELIG CLIN: HCPCS | Performed by: NURSE PRACTITIONER

## 2021-04-06 ASSESSMENT — ENCOUNTER SYMPTOMS
TROUBLE SWALLOWING: 0
VOMITING: 0
NAUSEA: 0
DIARRHEA: 0
EYE DISCHARGE: 0
EYE ITCHING: 0
CONSTIPATION: 0
SHORTNESS OF BREATH: 0
ABDOMINAL DISTENTION: 0
STRIDOR: 0
COLOR CHANGE: 0
SORE THROAT: 0
CHOKING: 0
WHEEZING: 0
BLOOD IN STOOL: 0
COUGH: 0
ABDOMINAL PAIN: 0

## 2021-04-06 ASSESSMENT — PATIENT HEALTH QUESTIONNAIRE - PHQ9
SUM OF ALL RESPONSES TO PHQ QUESTIONS 1-9: 0
SUM OF ALL RESPONSES TO PHQ QUESTIONS 1-9: 0

## 2021-04-06 NOTE — PROGRESS NOTES
[address] [facility]  [address] [id number] [address] [address]  Dept: [phone]  Dept Fax: [phone]: [phone]    Lisa Langley (:  3/25/1929) is a 80 y.o. female,Established patient, here for evaluation of the following chief complaint(s): Other (pt fell the   went to urgent care 2 days after)      Lisa Langley is a 80 y.o. female who presents today for her medical conditions/complaints as noted below. Lisa Langley is c/sriram Other (pt fell the   went to urgent care 2 days after)        HPI:     HPI   #1 fall in March she had a fall she states she fell against some steps at her house. She had apparent skin tear to her right shoulder it is almost completely healed. At that time they wanted to give her tetanus but she refused since that time she has called back and made an appointment to get a tetanus. She was told over the phone that her insurance would not pay for it if she got it in the office as the time for that injury is over and this would be just a well vaccination. She said that she did not understand that. I informed her she can go to the health department to get this and then I was also informed by our  that would be around $150 for the injection in the administration of that injection. She wanted to go to get it today noting I encouraged her to go to the health department to save her $150 for the next time she is cut or injured to come to the office we can give it to them for that specific injury. 2.  Old CVA with continued occasional falls. She does have a walker I did offer her home health which she declined also offered outpatient therapy and she declined that as well she said that she has had it before and did not help her. I asked again what she wanted me to do for her today and she said I suppose there is nothing. She is very frail and I am concerned about her cognition.   I think Dr. Ambar Christy is asked in the past that a family member be with her but there is no one here with her today.   She said that she has a sister and she said that her nephews are in visiting her sister this week  Chief Complaint   Patient presents with    Other     pt fell the   went to urgent care 2 days after       Past Medical History:   Diagnosis Date    Age-related osteoporosis without current pathological fracture 8/10/2017    Arthritis     BPV (benign positional vertigo)     Collagenous colitis 8/10/2017    Dupuytren's contracture of both hands     Ehrlichiosis     Hyperlipidemia     Hypertensive crisis     Lumbar disc disease 8/10/2017    Memory loss     Meralgia paraesthetica     Other emphysema ([address] 2017    Primary osteoarthritis of knee 8/10/2017    Sensorineural hearing loss     Venous insufficiency     Venous stasis dermatitis of both lower extremities 2019    Vitamin D deficiency       Past Surgical History:   Procedure Laterality Date    COLONOSCOPY      KNEE SURGERY Bilateral     total knee arthroplasty 11/10    TOE AMPUTATION Right 2017    TOE AMPUTATION 2ND DIGIT performed by Yuly Sands DPM at [address] [facility] 2021 3/19/2021 2021 2020 2020 3/3/5215   SYSTOLIC 107 533 232 [phone]   DIASTOLIC 80 90 70 68 78 60   Site - - - Left Upper Arm - Left Upper Arm   Position - - - - - -   Pulse - 114 64 78 98 70   Temp - 97.3 - - 97.1 -   Resp - - - - 18 -   SpO2 - - - 96 98 93   Weight - 103 lb 109 lb 112 lb 115 lb 115 lb   Height - - 5' 0\" 5' 0\" 5' 3\" 5' 0\"   Body mass index - - 21.29 kg/m2 21.87 kg/m2 20.37 kg/m2 22.46 kg/m2   Some recent data might be hidden       Family History   Problem Relation Age of Onset    Atrial Fibrillation Sister     Stroke Sister         ischemic    Breast Cancer Sister [de-identified]       Social History     Tobacco Use    Smoking status: Former Smoker     Quit date: 1965     Years since quittin.2    Smokeless tobacco: Value Date     07/25/2017    K 3.6 07/25/2017     07/25/2017    CO2 26 07/25/2017    BUN 17 07/25/2017    CREATININE 0.7 07/25/2017    GLUCOSE 110 (H) 07/25/2017    CALCIUM 9.6 07/25/2017    PROT 6.7 07/25/2017    LABALBU 3.8 07/25/2017    BILITOT 0.5 07/25/2017    ALKPHOS 75 07/25/2017    AST 20 07/25/2017    ALT 17 07/25/2017    LABGLOM >60 07/25/2017     No results found for: CHOL  No results found for: TRIG  No results found for: HDL  No results found for: Dustin Gonzalez [address]  Lab Results   Component Value Date     07/25/2017    K 3.6 07/25/2017     07/25/2017    CO2 26 07/25/2017    BUN 17 07/25/2017    CREATININE 0.7 07/25/2017    GLUCOSE 110 07/25/2017    CALCIUM 9.6 07/25/2017      Lab Results   Component Value Date    WBC 6.8 10/03/2019    HGB 13.9 10/03/2019    HCT 41.5 10/03/2019    MCV 87.9 10/03/2019     10/03/2019    LABLYMP 1.37 06/12/2014    LYMPHOPCT 17.7 (L) 10/03/2019    RBC 4.72 10/03/2019    MCH 29.4 10/03/2019    MCHC 33.5 10/03/2019    RDW 13.2 10/03/2019     No results found for: VITD25    Subjective:      Review of Systems   Constitutional: Negative for fatigue, fever and unexpected weight change. HENT: Positive for hearing loss. Negative for ear discharge, ear pain, mouth sores, sore throat and trouble swallowing. Eyes: Negative for discharge, itching and visual disturbance. Respiratory: Negative for cough, choking, shortness of breath, wheezing and stridor. Cardiovascular: Negative for chest pain, palpitations and leg swelling. Gastrointestinal: Negative for abdominal distention, abdominal pain, blood in stool, constipation, diarrhea, nausea and vomiting. Endocrine: Negative for cold intolerance, polydipsia and polyuria. Genitourinary: Negative for difficulty urinating, dysuria, frequency and urgency. Musculoskeletal: Negative for arthralgias and gait problem. Skin: Negative for color change and rash.    Allergic/Immunologic: Negative for food allergies and immunocompromised state. Neurological: Positive for weakness. Negative for dizziness, tremors, syncope, speech difficulty and headaches. Old CVA with left-sided weakness   Hematological: Negative for adenopathy. Does not bruise/bleed easily. Psychiatric/Behavioral: Negative for confusion and hallucinations. Objective:     Physical Exam  Constitutional:       General: She is not in acute distress. Appearance: She is well-developed. HENT:      Head: Normocephalic and atraumatic. Eyes:      General: No scleral icterus. Right eye: No discharge. Left eye: No discharge. Pupils: Pupils are equal, round, and reactive to light. Neck:      Musculoskeletal: Normal range of motion and neck supple. Thyroid: No thyromegaly. Vascular: No JVD. Cardiovascular:      Rate and Rhythm: Normal rate and regular rhythm. Heart sounds: Normal heart sounds. No murmur. Pulmonary:      Effort: Pulmonary effort is normal. No respiratory distress. Breath sounds: Normal breath sounds. No wheezing or rales. Abdominal:      General: Bowel sounds are normal. There is no distension. Palpations: Abdomen is soft. There is no mass. Tenderness: There is no abdominal tenderness. There is no guarding or rebound. Musculoskeletal: Normal range of motion. General: No tenderness. Arms:       Comments: She has a healed skin tear on her right shoulder. Skin:     General: Skin is warm and dry. Findings: No erythema or rash. Neurological:      Mental Status: She is alert and oriented to person, place, and time. Cranial Nerves: No cranial nerve deficit. Coordination: Coordination normal.      Deep Tendon Reflexes: Reflexes are normal and symmetric. Reflexes normal.      Comments: She is hard of hearing she is very slow responses. She has shuffling gait with a cane.    Psychiatric:         Mood and Affect: Mood is not depressed. Behavior: Behavior normal.         Thought Content: Thought content normal.         Judgment: Judgment normal.       /80     Assessment:       Diagnosis Orders   1. Skin tear of right upper arm without complication, sequela     2. Cerebrovascular accident (CVA) due to thrombosis of left middle cerebral artery ([facility]     3. Dementia associated with other underlying disease without behavioral disturbance ([facility]     4. Unsteady gait         Diagnostics reviewed from recent urgent care visit in March  Plan:        Patient given educational materials - see patient instructions. Discussed use, benefit, and side effects of prescribed medications. Allpatient questions answered. Pt voiced understanding. Reviewed health maintenance. Instructed to continue current medications, diet and exercise. Patient agreed with treatment plan. Follow up as directed. MEDICATIONS:  No orders of the defined types were placed in this encounter. ORDERS:  No orders of the defined types were placed in this encounter. Follow-up:  Return for keep fu appt, have labs done prior to appt. PATIENT INSTRUCTIONS:  Patient Instructions   1. Skin tear is completely healed. Please go to the health department and get your tetanus shot or if you are injured again at home you cut yourself or just a small abrasion come over to the office and we can give you a tetanus and get it up-to-date. Is really no reason to give you one today in charge you the cost for that. 2.  Recent falls if you decide you want physical therapy at any point give us a call we can set that up for you. Electronically signed by GISELE Bone on 4/6/2021 at 9:21 AM        EMRDragon/transcription disclaimer:  Much of this encounter note is electronic transcription/translation of spoken language to printed texts.   The electronic translation of spoken language may be erroneous, or at times,nonsensical words or phrases may be inadvertently transcribed.   Although I have reviewed the note for such errors, some may still exist.

## 2021-04-06 NOTE — PATIENT INSTRUCTIONS
1.  Skin tear is completely healed. Please go to the health department and get your tetanus shot or if you are injured again at home you cut yourself or just a small abrasion come over to the office and we can give you a tetanus and get it up-to-date. Is really no reason to give you one today in charge you the cost for that. 2.  Recent falls if you decide you want physical therapy at any point give us a call we can set that up for you.

## 2021-04-10 ENCOUNTER — HOSPITAL ENCOUNTER (INPATIENT)
Age: 86
LOS: 4 days | Discharge: NURSING, REHAB OR LONG-TERM CARE | DRG: 280 | End: 2021-04-14
Attending: EMERGENCY MEDICINE | Admitting: INTERNAL MEDICINE
Payer: MEDICARE

## 2021-04-10 ENCOUNTER — APPOINTMENT (OUTPATIENT)
Dept: CT IMAGING | Age: 86
DRG: 280 | End: 2021-04-10
Payer: MEDICARE

## 2021-04-10 ENCOUNTER — APPOINTMENT (OUTPATIENT)
Dept: GENERAL RADIOLOGY | Age: 86
DRG: 280 | End: 2021-04-10
Payer: MEDICARE

## 2021-04-10 DIAGNOSIS — R45.1 AGITATION: ICD-10-CM

## 2021-04-10 DIAGNOSIS — F41.9 ANXIETY: ICD-10-CM

## 2021-04-10 DIAGNOSIS — I21.4 NSTEMI (NON-ST ELEVATED MYOCARDIAL INFARCTION) (HCC): Primary | ICD-10-CM

## 2021-04-10 DIAGNOSIS — M62.82 NON-TRAUMATIC RHABDOMYOLYSIS: ICD-10-CM

## 2021-04-10 DIAGNOSIS — S52.125A CLOSED NONDISPLACED FRACTURE OF HEAD OF LEFT RADIUS, INITIAL ENCOUNTER: ICD-10-CM

## 2021-04-10 DIAGNOSIS — Z51.5 PALLIATIVE CARE PATIENT: ICD-10-CM
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
## 2021-04-10 LAB
ALBUMIN SERPL-MCNC: 3.8 G/DL (ref 3.5–5.2)
ALP BLD-CCNC: 75 U/L (ref 35–104)
ALT SERPL-CCNC: 27 U/L (ref 5–33)
ANION GAP SERPL CALCULATED.3IONS-SCNC: 12 MMOL/L (ref 7–19)
APTT: 27.9 SEC (ref 26–36.2)
APTT: 28.2 SEC (ref 26–36.2)
AST SERPL-CCNC: 89 U/L (ref 5–32)
BACTERIA: NEGATIVE /HPF
BASOPHILS ABSOLUTE: 0.1 K/UL (ref 0–0.2)
BASOPHILS RELATIVE PERCENT: 0.4 % (ref 0–1)
BILIRUB SERPL-MCNC: 1 MG/DL (ref 0.2–1.2)
BILIRUBIN URINE: NEGATIVE
BLOOD, URINE: NEGATIVE
BUN BLDV-MCNC: 20 MG/DL (ref 8–23)
CALCIUM SERPL-MCNC: 9.3 MG/DL (ref 8.2–9.6)
CHLORIDE BLD-SCNC: 104 MMOL/L (ref 98–111)
CLARITY: CLEAR
CO2: 24 MMOL/L (ref 22–29)
COLOR: YELLOW
CREAT SERPL-MCNC: 0.6 MG/DL (ref 0.5–0.9)
CRYSTALS, UA: ABNORMAL /HPF
EOSINOPHILS ABSOLUTE: 0 K/UL (ref 0–0.6)
EOSINOPHILS RELATIVE PERCENT: 0.1 % (ref 0–5)
EPITHELIAL CELLS, UA: 1 /HPF (ref 0–5)
GFR AFRICAN AMERICAN: >59
GFR NON-AFRICAN AMERICAN: >60
GLUCOSE BLD-MCNC: 90 MG/DL (ref 74–109)
GLUCOSE URINE: NEGATIVE MG/DL
HCT VFR BLD CALC: 44.4 % (ref 37–47)
HEMOGLOBIN: 14.6 G/DL (ref 12–16)
HYALINE CASTS: 14 /HPF (ref 0–8)
IMMATURE GRANULOCYTES #: 0.1 K/UL
INR BLD: 1.02 (ref 0.88–1.18)
KETONES, URINE: 40 MG/DL
LEUKOCYTE ESTERASE, URINE: NEGATIVE
LYMPHOCYTES ABSOLUTE: 1.2 K/UL (ref 1.1–4.5)
LYMPHOCYTES RELATIVE PERCENT: 8.9 % (ref 20–40)
MCH RBC QN AUTO: 28.7 PG (ref 27–31)
MCHC RBC AUTO-ENTMCNC: 32.9 G/DL (ref 33–37)
MCV RBC AUTO: 87.2 FL (ref 81–99)
MONOCYTES ABSOLUTE: 1 K/UL (ref 0–0.9)
MONOCYTES RELATIVE PERCENT: 7.8 % (ref 0–10)
NEUTROPHILS ABSOLUTE: 10.9 K/UL (ref 1.5–7.5)
NEUTROPHILS RELATIVE PERCENT: 82.3 % (ref 50–65)
NITRITE, URINE: NEGATIVE
PDW BLD-RTO: 13.2 % (ref 11.5–14.5)
PH UA: 5 (ref 5–8)
PLATELET # BLD: 211 K/UL (ref 130–400)
PMV BLD AUTO: 9.2 FL (ref 9.4–12.3)
POTASSIUM SERPL-SCNC: 4.8 MMOL/L (ref 3.5–5)
PRO-BNP: 8177 PG/ML (ref 0–1800)
PROTEIN UA: 30 MG/DL
PROTHROMBIN TIME: 13.3 SEC (ref 12–14.6)
RBC # BLD: 5.09 M/UL (ref 4.2–5.4)
RBC UA: 7 /HPF (ref 0–4)
SARS-COV-2, NAAT: NOT DETECTED
SODIUM BLD-SCNC: 140 MMOL/L (ref 136–145)
SPECIFIC GRAVITY UA: 1.03 (ref 1–1.03)
TOTAL CK: 1339 U/L (ref 26–192)
TOTAL PROTEIN: 6.3 G/DL (ref 6.6–8.7)
TROPONIN: 0.18 NG/ML (ref 0–0.03)
TROPONIN: 0.2 NG/ML (ref 0–0.03)
TROPONIN: 0.24 NG/ML (ref 0–0.03)
TROPONIN: 0.34 NG/ML (ref 0–0.03)
UROBILINOGEN, URINE: 1 E.U./DL
WBC # BLD: 13.2 K/UL (ref 4.8–10.8)
WBC UA: 3 /HPF (ref 0–5)

## 2021-04-10 PROCEDURE — 84484 ASSAY OF TROPONIN QUANT: CPT

## 2021-04-10 PROCEDURE — 83880 ASSAY OF NATRIURETIC PEPTIDE: CPT

## 2021-04-10 PROCEDURE — 81001 URINALYSIS AUTO W/SCOPE: CPT

## 2021-04-10 PROCEDURE — 36415 COLL VENOUS BLD VENIPUNCTURE: CPT

## 2021-04-10 PROCEDURE — 87635 SARS-COV-2 COVID-19 AMP PRB: CPT

## 2021-04-10 PROCEDURE — 6360000002 HC RX W HCPCS: Performed by: EMERGENCY MEDICINE

## 2021-04-10 PROCEDURE — 85610 PROTHROMBIN TIME: CPT

## 2021-04-10 PROCEDURE — 82550 ASSAY OF CK (CPK): CPT

## 2021-04-10 PROCEDURE — 72192 CT PELVIS W/O DYE: CPT

## 2021-04-10 PROCEDURE — 72125 CT NECK SPINE W/O DYE: CPT

## 2021-04-10 PROCEDURE — 2140000000 HC CCU INTERMEDIATE R&B

## 2021-04-10 PROCEDURE — 80053 COMPREHEN METABOLIC PANEL: CPT

## 2021-04-10 PROCEDURE — 93005 ELECTROCARDIOGRAM TRACING: CPT | Performed by: EMERGENCY MEDICINE

## 2021-04-10 PROCEDURE — 70450 CT HEAD/BRAIN W/O DYE: CPT

## 2021-04-10 PROCEDURE — 73080 X-RAY EXAM OF ELBOW: CPT

## 2021-04-10 PROCEDURE — 6360000002 HC RX W HCPCS: Performed by: INTERNAL MEDICINE

## 2021-04-10 PROCEDURE — 85730 THROMBOPLASTIN TIME PARTIAL: CPT

## 2021-04-10 PROCEDURE — 71045 X-RAY EXAM CHEST 1 VIEW: CPT

## 2021-04-10 PROCEDURE — 85025 COMPLETE CBC W/AUTO DIFF WBC: CPT

## 2021-04-10 PROCEDURE — 96374 THER/PROPH/DIAG INJ IV PUSH: CPT

## 2021-04-10 PROCEDURE — 99284 EMERGENCY DEPT VISIT MOD MDM: CPT

## 2021-04-10 RX ORDER — ASPIRIN 81 MG/1
81 TABLET, CHEWABLE ORAL DAILY
Status: DISCONTINUED | OUTPATIENT
Start: 2021-04-11 | End: 2021-04-14 | Stop reason: HOSPADM

## 2021-04-10 RX ORDER — ACETAMINOPHEN 650 MG/1
650 SUPPOSITORY RECTAL EVERY 6 HOURS PRN
Status: DISCONTINUED | OUTPATIENT
Start: 2021-04-10 | End: 2021-04-14 | Stop reason: HOSPADM

## 2021-04-10 RX ORDER — CLOPIDOGREL BISULFATE 75 MG/1
75 TABLET ORAL DAILY
Status: DISCONTINUED | OUTPATIENT
Start: 2021-04-11 | End: 2021-04-14 | Stop reason: HOSPADM

## 2021-04-10 RX ORDER — POLYETHYLENE GLYCOL 3350 17 G/17G
17 POWDER, FOR SOLUTION ORAL DAILY PRN
Status: DISCONTINUED | OUTPATIENT
Start: 2021-04-10 | End: 2021-04-14 | Stop reason: HOSPADM

## 2021-04-10 RX ORDER — LOSARTAN POTASSIUM 25 MG/1
25 TABLET ORAL DAILY
Status: DISCONTINUED | OUTPATIENT
Start: 2021-04-10 | End: 2021-04-14 | Stop reason: HOSPADM

## 2021-04-10 RX ORDER — METOPROLOL SUCCINATE 25 MG/1
25 TABLET, EXTENDED RELEASE ORAL DAILY
Status: DISCONTINUED | OUTPATIENT
Start: 2021-04-11 | End: 2021-04-14 | Stop reason: HOSPADM

## 2021-04-10 RX ORDER — NITROGLYCERIN 0.4 MG/1
0.4 TABLET SUBLINGUAL EVERY 5 MIN PRN
Status: DISCONTINUED | OUTPATIENT
Start: 2021-04-10 | End: 2021-04-14 | Stop reason: HOSPADM

## 2021-04-10 RX ORDER — ATORVASTATIN CALCIUM 40 MG/1
40 TABLET, FILM COATED ORAL NIGHTLY
Status: DISCONTINUED | OUTPATIENT
Start: 2021-04-10 | End: 2021-04-14 | Stop reason: HOSPADM

## 2021-04-10 RX ORDER — SODIUM CHLORIDE 0.9 % (FLUSH) 0.9 %
5-40 SYRINGE (ML) INJECTION EVERY 12 HOURS SCHEDULED
Status: DISCONTINUED | OUTPATIENT
Start: 2021-04-10 | End: 2021-04-14 | Stop reason: HOSPADM

## 2021-04-10 RX ORDER — SODIUM CHLORIDE 9 MG/ML
25 INJECTION, SOLUTION INTRAVENOUS PRN
Status: DISCONTINUED | OUTPATIENT
Start: 2021-04-10 | End: 2021-04-14 | Stop reason: HOSPADM

## 2021-04-10 RX ORDER — ACETAMINOPHEN 325 MG/1
650 TABLET ORAL EVERY 6 HOURS PRN
Status: DISCONTINUED | OUTPATIENT
Start: 2021-04-10 | End: 2021-04-14 | Stop reason: HOSPADM

## 2021-04-10 RX ORDER — LORAZEPAM 2 MG/ML
1 INJECTION INTRAMUSCULAR ONCE
Status: COMPLETED | OUTPATIENT
Start: 2021-04-10 | End: 2021-04-10

## 2021-04-10 RX ORDER — HEPARIN SODIUM 1000 [USP'U]/ML
30 INJECTION, SOLUTION INTRAVENOUS; SUBCUTANEOUS PRN
Status: DISCONTINUED | OUTPATIENT
Start: 2021-04-10 | End: 2021-04-10

## 2021-04-10 RX ORDER — HEPARIN SODIUM 1000 [USP'U]/ML
60 INJECTION, SOLUTION INTRAVENOUS; SUBCUTANEOUS ONCE
Status: DISCONTINUED | OUTPATIENT
Start: 2021-04-10 | End: 2021-04-10

## 2021-04-10 RX ORDER — SODIUM CHLORIDE 0.9 % (FLUSH) 0.9 %
10 SYRINGE (ML) INJECTION PRN
Status: DISCONTINUED | OUTPATIENT
Start: 2021-04-10 | End: 2021-04-14 | Stop reason: HOSPADM

## 2021-04-10 RX ORDER — PROMETHAZINE HYDROCHLORIDE 12.5 MG/1
12.5 TABLET ORAL EVERY 6 HOURS PRN
Status: DISCONTINUED | OUTPATIENT
Start: 2021-04-10 | End: 2021-04-14 | Stop reason: HOSPADM

## 2021-04-10 RX ORDER — HEPARIN SODIUM 10000 [USP'U]/100ML
5-30 INJECTION, SOLUTION INTRAVENOUS CONTINUOUS
Status: DISCONTINUED | OUTPATIENT
Start: 2021-04-10 | End: 2021-04-10

## 2021-04-10 RX ORDER — HEPARIN SODIUM 1000 [USP'U]/ML
60 INJECTION, SOLUTION INTRAVENOUS; SUBCUTANEOUS PRN
Status: DISCONTINUED | OUTPATIENT
Start: 2021-04-10 | End: 2021-04-10

## 2021-04-10 RX ORDER — ONDANSETRON 2 MG/ML
4 INJECTION INTRAMUSCULAR; INTRAVENOUS EVERY 6 HOURS PRN
Status: DISCONTINUED | OUTPATIENT
Start: 2021-04-10 | End: 2021-04-14 | Stop reason: HOSPADM

## 2021-04-10 RX ORDER — DONEPEZIL HYDROCHLORIDE 5 MG/1
5 TABLET, FILM COATED ORAL NIGHTLY
Status: DISCONTINUED | OUTPATIENT
Start: 2021-04-10 | End: 2021-04-14 | Stop reason: HOSPADM

## 2021-04-10 RX ORDER — HYDROCHLOROTHIAZIDE 25 MG/1
25 TABLET ORAL DAILY
Status: DISCONTINUED | OUTPATIENT
Start: 2021-04-10 | End: 2021-04-14 | Stop reason: HOSPADM

## 2021-04-10 RX ADMIN — LORAZEPAM 1 MG: 2 INJECTION INTRAMUSCULAR; INTRAVENOUS at 12:59

## 2021-04-10 RX ADMIN — LORAZEPAM 1 MG: 2 INJECTION INTRAMUSCULAR; INTRAVENOUS at 17:14

## 2021-04-10 ASSESSMENT — ENCOUNTER SYMPTOMS
RHINORRHEA: 0
SORE THROAT: 0
VOMITING: 0
ABDOMINAL PAIN: 0
DIARRHEA: 0
NAUSEA: 0
SHORTNESS OF BREATH: 0
BACK PAIN: 0

## 2021-04-10 ASSESSMENT — PAIN SCALES - WONG BAKER: WONGBAKER_NUMERICALRESPONSE: 2

## 2021-04-10 NOTE — ED PROVIDER NOTES
[facility] EMERGENCY DEPT  eMERGENCY dEPARTMENT eNCOUnter      Pt Name: Jackie Maldonado  MRN: [id number]  [place] 3/25/1929  Date of evaluation: 4/10/2021  Provider: Alberto Rose MD    [address] [place] [address]       Chief Complaint   Patient presents with    Fall         HISTORY OF PRESENT ILLNESS   (Location/Symptom, Timing/Onset,Context/Setting, Quality, Duration, Modifying Factors, Severity)  Note limiting factors. Jackie Maldonado is a 80 y.o. female who presents to the emergency department after a fall. The patient lives alone. She says that she was checking on her chicken coop alarm when she fell on the pavement. She said she crawled inside. Left elbow was hurting yesterday but not today. She said she did not want to come today and it is a waste of time. Her sister states that she was found today. She has been lying on the floor all night long. They were unable to get her up. Sister is poa believes pt is unable to care for herself at home. She is DNR. HPI    NursingNotes were reviewed. REVIEW OF SYSTEMS    (2-9 systems for level 4, 10 or more for level 5)     Review of Systems   Constitutional: Negative for chills and fever. HENT: Negative for rhinorrhea and sore throat. Respiratory: Negative for shortness of breath. Cardiovascular: Negative for chest pain and leg swelling. Gastrointestinal: Negative for abdominal pain, diarrhea, nausea and vomiting. Genitourinary: Negative for difficulty urinating. Musculoskeletal: Positive for joint swelling. Negative for back pain and neck pain. Skin: Negative for rash. Neurological: Negative for weakness and headaches. Hematological: Bruises/bleeds easily. Psychiatric/Behavioral: Positive for confusion. A complete review of systems was performed and is negative except as noted above in the HPI.        PAST MEDICAL HISTORY     Past Medical History:   Diagnosis Date    Age-related osteoporosis without current pathological fracture 8/10/2017    Arthritis     BPV (benign positional vertigo)     Collagenous colitis 8/10/2017    Dupuytren's contracture of both hands 8/35/4056    Ehrlichiosis     Hyperlipidemia     Hypertensive crisis     Lumbar disc disease 8/10/2017    Memory loss     Meralgia paraesthetica     Other emphysema ([address] 12/20/2017    Primary osteoarthritis of knee 8/10/2017    Sensorineural hearing loss     Venous insufficiency     Venous stasis dermatitis of both lower extremities 1/11/2019    Vitamin D deficiency          SURGICAL HISTORY       Past Surgical History:   Procedure Laterality Date    COLONOSCOPY      KNEE SURGERY Bilateral     total knee arthroplasty 11/10    TOE AMPUTATION Right 1/23/2017    TOE AMPUTATION 2ND DIGIT performed by Benigno Zhong DPM at [address] [facility] OR         CURRENT MEDICATIONS       Previous Medications    ALENDRONATE (FOSAMAX) 70 MG TABLET    TAKE 1 TABLET EVERY WEEK    APOAEQUORIN (PREVAGEN) 10 MG CAPS    Take by mouth    ATORVASTATIN (LIPITOR) 20 MG TABLET    Take one pill daily    CLOPIDOGREL (PLAVIX) 75 MG TABLET    Take 1 tablet by mouth daily    DICLOFENAC SODIUM (VOLTAREN) 1 % GEL    Apply 4 g topically 4 times daily as needed for Pain    DONEPEZIL (ARICEPT) 5 MG TABLET    TAKE 1 TABLET EVERY NIGHT    FUROSEMIDE (LASIX) 20 MG TABLET    Take 1 tablet by mouth daily as needed (edema)    HYDROCHLOROTHIAZIDE (HYDRODIURIL) 25 MG TABLET    Take 1 tablet by mouth daily    LOSARTAN (COZAAR) 25 MG TABLET    Take 1 tablet by mouth daily    METOPROLOL SUCCINATE (TOPROL XL) 25 MG EXTENDED RELEASE TABLET    TAKE 1 TABLET EVERY DAY FOR BLOOD PRESSURE    MULTIPLE VITAMINS-MINERALS (PRESERVISION AREDS 2 PO)    Take by mouth daily    VITAMIN D (ERGOCALCIFEROL) 34074 UNITS CAPS CAPSULE    TAKE ONE CAPSULE BY MOUTH ONCE WEEKLY       ALLERGIES     Other    FAMILY HISTORY       Family History   Problem Relation Age of Onset    Atrial Fibrillation Sister     Stroke Sister         ischemic    Breast Cancer Sister [de-identified]          SOCIAL HISTORY       Social History     Socioeconomic History    Marital status: Single     Spouse name: None    Number of children: 0    Years of education: 15    Highest education level: None   Occupational History    Occupation: retired   Social Needs    Financial resource strain: Not very hard    Food insecurity     Worry: Never true     Inability: Never true    Transportation needs     Medical: None     Non-medical: None   Tobacco Use    Smoking status: Former Smoker     Quit date: 1965     Years since quittin.3    Smokeless tobacco: Never Used   Substance and Sexual Activity    Alcohol use: No    Drug use: No    Sexual activity: Never   Lifestyle    Physical activity     Days per week: None     Minutes per session: None    Stress: None   Relationships    Social connections     Talks on phone: None     Gets together: None     Attends Muslim service: None     Active member of club or organization: None     Attends meetings of clubs or organizations: None     Relationship status: None    Intimate partner violence     Fear of current or ex partner: None     Emotionally abused: None     Physically abused: None     Forced sexual activity: None   Other Topics Concern    None   Social History Narrative    None       SCREENINGS             PHYSICAL EXAM    (up to 7 for level 4, 8 or more for level 5)     ED Triage Vitals [04/10/21 1115]   BP Temp Temp src Pulse Resp SpO2 Height Weight   (!) 161/80 98.6 °F (37 °C) -- 80 16 99 % 5' (1.524 m) 103 lb (46.7 kg)       Physical Exam  Vitals signs and nursing note reviewed. Constitutional:       General: She is not in acute distress. Appearance: She is well-developed. She is not diaphoretic. HENT:      Head: Normocephalic and atraumatic. Eyes:      Pupils: Pupils are equal, round, and reactive to light. Neck:      Musculoskeletal: Normal range of motion and neck supple.    Cardiovascular:      Rate and Rhythm: Normal rate and regular rhythm. Heart sounds: Normal heart sounds. Pulmonary:      Effort: Pulmonary effort is normal. No respiratory distress. Breath sounds: Normal breath sounds. Abdominal:      General: Bowel sounds are normal. There is no distension. Palpations: Abdomen is soft. Tenderness: There is no abdominal tenderness. Musculoskeletal: Normal range of motion. General: Swelling, tenderness, deformity and signs of injury present. Comments: Bruising and edema to left elbow   Skin:     General: Skin is warm and dry. Findings: Bruising present. No rash. Neurological:      Mental Status: She is alert. She is disoriented. Cranial Nerves: No cranial nerve deficit. Motor: No abnormal muscle tone. Coordination: Coordination normal.      Comments: Pt mostly cooperative, but then intermittently yells and is uncooperative   Psychiatric:         Behavior: Behavior normal.         DIAGNOSTIC RESULTS     EKG: All EKG's are interpreted by the Emergency Department Physician who either signs or Co-signs this chart in the absence of a cardiologist.    NSR rate 76 inferior an lateral t wave inversion, changed from prior  Repeat ekg shows lynne t wave v2, inferior and lateral t wave inversion    RADIOLOGY:   Non-plain film images such as CT, Ultrasound and MRI are read by the radiologist. Elder Jaquez images are visualized and preliminarily interpreted by the emergency physician with the below findings:      Interpretation per the Radiologist below, if available at the time of this note:    XR ELBOW LEFT (MIN 3 VIEWS)   Final Result   Impression:   Findings concerning for radial head fracture of uncertain chronicity. Signed by Dr Lindsay Lin on 4/10/2021 2:11 PM      XR CHEST PORTABLE   Final Result   Impression:   No acute cardiopulmonary disease.    Signed by Dr Lindsay Lin on 4/10/2021 2:09 PM      CT PELVIS WO CONTRAST Additional Contrast? None   Final Result Impression:   No acute osseous pathology. Signed by Dr Clarisa Harris on 4/10/2021 1:17 PM      CT Cervical Spine WO Contrast   Final Result   1. No acute osseous posttraumatic findings. Signed by Dr Clarisa Harris on 4/10/2021 1:14 PM      CT HEAD WO CONTRAST   Final Result   1. No acute intracranial abnormality. Signed by Dr Clarisa Harris on 4/10/2021 1:09 PM            ED BEDSIDE ULTRASOUND:   Performed by ED Physician - none    LABS:  Labs Reviewed   CBC WITH AUTO DIFFERENTIAL - Abnormal; Notable for the following components:       Result Value    WBC 13.2 (*)     MCHC 32.9 (*)     MPV 9.2 (*)     Neutrophils % 82.3 (*)     Lymphocytes % 8.9 (*)     Neutrophils Absolute 10.9 (*)     Monocytes Absolute 1.00 (*)     All other components within normal limits   COMPREHENSIVE METABOLIC PANEL - Abnormal; Notable for the following components: Total Protein 6.3 (*)     AST 89 (*)     All other components within normal limits   TROPONIN - Abnormal; Notable for the following components:    Troponin 0.34 (*)     All other components within normal limits    Narrative:     [facility]  Chemistry results called to and read back by Chula Hodges in ED, 04/10/2021  13:34, by [facility]   URINE RT REFLEX TO CULTURE - Abnormal; Notable for the following components:    Ketones, Urine 40 (*)     Protein, UA 30 (*)     All other components within normal limits   CK - Abnormal; Notable for the following components:     Total CK 1,339 (*)     All other components within normal limits   MICROSCOPIC URINALYSIS - Abnormal; Notable for the following components:    Bacteria, UA NEGATIVE (*)     Crystals, UA NEG (*)     Hyaline Casts, UA 14 (*)     RBC, UA 7 (*)     All other components within normal limits   BRAIN NATRIURETIC PEPTIDE - Abnormal; Notable for the following components:    Pro-BNP 8,177 (*)     All other components within normal limits   COVID-19, RAPID   APTT   PROTIME-INR   TROPONIN       All other labs were within normal range or not returned as of this dictation. EMERGENCY DEPARTMENT COURSE and DIFFERENTIALDIAGNOSIS/MDM:   Vitals:    Vitals:    04/10/21 1115 04/10/21 1415   BP: (!) 161/80 (!) 150/77   Pulse: 80 68   Resp: 16 18   Temp: 98.6 °F (37 °C) 98.5 °F (36.9 °C)   SpO2: 99% 99%   Weight: 103 lb (46.7 kg)    Height: 5' (1.524 m)        MDM     D/w Sister, her POA> pt is DNR. She will need placement after admission. D/w Dr Maria Fernanda Cardoso. Medical management. Heparin, echo, no cath  D/w dr Luca Hinojosa. Sling the elbow, follow up outpatient  D/w Dr Jany Edwards for admission    CONSULTS:  [address] [place] [address] Road:  Unless otherwise notedbelow, none     Procedures    FINAL IMPRESSION     1. NSTEMI (non-ST elevated myocardial infarction) ([facility]    2. Non-traumatic rhabdomyolysis    3.  Closed nondisplaced fracture of head of left radius, initial encounter          DISPOSITION/PLAN   DISPOSITION Decision To Admit 04/10/2021 02:49:12 PM      PATIENT REFERRED TO:  @FUP@    DISCHARGE MEDICATIONS:  New Prescriptions    No medications on file          (Please note that portions of this note were completed with a voice recognition program.  Efforts were made to edit the dictations butoccasionally words are mis-transcribed.)    Kannan Velásquez MD (electronically signed)  AttendingEmergency Physician         Kannan Velásquez MD  04/10/21 5836

## 2021-04-10 NOTE — PROGRESS NOTES
Informed Dr Denise Brody of ortho consult.   Electronically signed by Coretta Morgan RN on 4/10/2021 at 6:42 PM

## 2021-04-10 NOTE — PROGRESS NOTES
Per Anais Horne, patient's sister/ POA, family does not want patient to have extensive testing or procedures done during hospital stay (particulary cardiac testing). Have notified Dr Bacilio Ramos and Dr Julio Hernandez.    Electronically signed by Vida Stewart RN on 4/10/2021 at 6:44 PM

## 2021-04-10 NOTE — PROGRESS NOTES
Per Willa Hernandez, patient's sister and POA, patient has received both covid vaccines, the second being about a month ago. Unsure about flu or pneumonia vaccines. Valarie Barbour does not want patient have flu vaccine until patient is able to make decisions on her own and decide for herself whether or not she wants it.    Electronically signed by Ceasar Goodman RN on 4/10/2021 at 5:33 PM

## 2021-04-10 NOTE — H&P
atorvastatin (LIPITOR) 20 MG tablet Take one pill daily 1/11/19   Santa Allen MD   alendronate (FOSAMAX) 70 MG tablet TAKE 1 TABLET EVERY WEEK 1/11/19   Santa Allen MD        ALLERGIES:    Allergies: Other       REVIEW OF SYSTEMS :    Review of Systems   Unable to perform ROS: Dementia          PHYSICAL EXAM:    Physical Exam:    Vitals:   BP (!) 145/75   Pulse 75   Temp 98.2 °F (36.8 °C)   Resp 16   Ht 5' (1.524 m)   Wt 105 lb 1.6 oz (47.7 kg)   SpO2 99%   BMI 20.53 kg/m²     Physical Exam  Vitals signs and nursing note reviewed. Constitutional:       General: She is not in acute distress. Appearance: Normal appearance. She is ill-appearing. She is not toxic-appearing or diaphoretic. HENT:      Head: Normocephalic and atraumatic. Right Ear: External ear normal.      Left Ear: External ear normal.      Nose: Nose normal. No congestion or rhinorrhea. Mouth/Throat:      Mouth: Mucous membranes are moist.      Pharynx: Oropharynx is clear. No oropharyngeal exudate or posterior oropharyngeal erythema. Eyes:      General: No scleral icterus. Right eye: No discharge. Left eye: No discharge. Extraocular Movements: Extraocular movements intact. Conjunctiva/sclera: Conjunctivae normal.      Pupils: Pupils are equal, round, and reactive to light. Neck:      Musculoskeletal: Normal range of motion and neck supple. No neck rigidity or muscular tenderness. Vascular: No carotid bruit. Cardiovascular:      Rate and Rhythm: Normal rate and regular rhythm. Pulses: Normal pulses. Heart sounds: Normal heart sounds. No murmur. No friction rub. No gallop. Pulmonary:      Effort: Pulmonary effort is normal. No respiratory distress. Breath sounds: Normal breath sounds. No stridor. No wheezing, rhonchi or rales. Chest:      Chest wall: No tenderness. Abdominal:      General: Bowel sounds are normal. There is no distension.       Palpations: Abdomen is IMAGING REPORTS:     Xr Elbow Left (min 3 Views)    Result Date: 4/10/2021  Exam:   XR ELBOW LEFT (MIN 3 VIEWS)  Date:  4/10/2021 History:  Female, age  80 years; fall COMPARISON:  None. Findings : Irregularity at the radial head, concerning for fracture of uncertain chronicity. Impression: Findings concerning for radial head fracture of uncertain chronicity. Signed by Dr Terrell Colón on 4/10/2021 2:11 PM    Ct Head Wo Contrast    Result Date: 4/10/2021  EXAM: CT OF THE HEAD WITHOUT IV CONTRAST 4/10/2021 COMPARISON: Head CT dated Kendra 15, 2018. INDICATION: Female, 80years-old. History of stroke , fall, syncope PROCEDURE: Non contrast enhanced head CT was performed. Radiation dose equals DLP 1193 mGy-cm. Automated exposure control dose reduction technique was implemented. FINDINGS: Mild generalized cerebral and loss. Mild chronic microvascular changes. . There is no evidence of mass-effect or midline shift. The gray-white differentiation is preserved. There is no evidence of intracranial contusion, hemorrhage, or skull fracture. The visualized portions of the paranasal sinuses are free of obstructive mucosal disease. The visualized portions of the mastoid air cells are clear. There is a large mucous retention cysts in the left maxillary sinus. 1.   No acute intracranial abnormality. Signed by Dr Terrell Colón on 4/10/2021 1:09 PM    Ct Cervical Spine Wo Contrast    Result Date: 4/10/2021  EXAMINATION: CT CERVICAL SPINE WITHOUT IV CONTRAST 4/10/2021 COMPARISON: CT cervical spine dated June 25, 2018. INDICATION: Female, 80years-old. Fall PROCEDURE: Multiple CT images of the cervical spine were obtained without IV contrast. Images were formatted in the axial, coronal and sagittal planes. FINDINGS: The odontoid process is well approximated with the anterior body of C1 and well aligned between the lateral masses of C1. Grade 1 anterolisthesis of C4 on C5 and C5 on C6. There is no acute compression deformity. Venous insufficiency 12/19/2017    Dementia associated with other underlying disease without behavioral disturbance ([address] 12/19/2017    Sensorineural hearing loss 12/19/2017    Essential hypertension 08/11/2017    Age-related osteoporosis without current pathological fracture 08/10/2017    Lumbar disc disease 08/10/2017     L5-S1 spondylolisthesis, Spinal stenosis, L5  Bilateral NF stenosis L4-5 , L5-S1  On MRI 2014      Dupuytren's contracture of both hands 08/10/2017    Collagenous colitis 08/10/2017     Biopsy 2013      Primary osteoarthritis of knee 08/10/2017    Cerebrovascular accident (CVA) ([address] 04/30/2017    Hyperlipidemia     Arthritis        Hospital Problems           Last Modified POA    * (Principal) NSTEMI (non-ST elevated myocardial infarction) ([address] 4/10/2021 Yes    Closed nondisplaced fracture of head of left radius 4/10/2021 Yes    Non-traumatic rhabdomyolysis 4/10/2021 Yes    Fall 4/10/2021 Yes    Dementia associated with other underlying disease without behavioral disturbance ([address] 4/10/2021 Yes          Principal Problem:    NSTEMI (non-ST elevated myocardial infarction) ([address]  Active Problems:    Closed nondisplaced fracture of head of left radius    Non-traumatic rhabdomyolysis    Fall    Dementia associated with other underlying disease without behavioral disturbance ([address]  Resolved Problems:    * No resolved hospital problems.  *          NSTEMI  Elevated proBNP  · Initial ProBNP: 8,177   - Initial troponin on 0.34 --> 0.020   - Will trend troponin    - Serial EKGs for chest pain   - Optimized medical management   --> Nitro glycerin sublingual when necessary for chest pain   - 2D Echocardiogram:    - Continue to monitor on telemetry   - Fasting Lipid panel in a.m    - Famotidine    - Cardiology consulted from ED   Heparin ggt   · NPO   · Further management as per cardiology recommendation    Fall  Closed nondisplaced fracture of head of left radius  · CT head without contrast with no acute intracranial pathology  · CT cervical spine without contrast - No acute osseous posttraumatic findings  · CT pelvis without contrast - No acute osseous pathology  · Chest x-ray: No acute cardiopulmonary disease. · X-ray left elbow: Impression: Findings concerning for radial head fracture of uncertain chronicity   · Orthopedic surgery consulted from ED  · No acute surgical intervention recommended  · Continue symptomatic and supportive management  · Fall precautions      Nontraumatic rhabdomyolysis  · Initial CK of 1,339 (04/10/2021)  · Gentle IV hydration   · Monitor CK level      History of HTN  · Continue home regimen  · Losartan 25 mg p.o. daily  · Hydrochlorothiazide 25 mg p.o. daily  · Metoprolol succinate 25 mg p.o. daily    History of dementia, with intermittent agitation  · Donepezil 5 mg p.o. nightly      Continue management of other chronic medical conditions - Please see orders above         CONSULTS:    IP CONSULT TO CARDIOLOGY  IP CONSULT TO ORTHOPEDIC SURGERY        INPATIENT CHECKLIST:      Nutrition: Diet NPO Effective Now    Prophylaxis Orders:   VTE - Heparin     CODE STATUS: DNR-CC  ISOLATION:       DISCHARGE PLAN: tbd     Total face-to-face time spent with this patient, time spent reviewing medical records, and in coordination of care with the emergency department physician, nursing staff, in the examination, evaluation/assessment, counseling, review of medications and plan, was  50 mins . Electronically signed by   Madison Martin MD, MPH  Internal Medicine Hospitalist   4/10/2021 5:09 PM      EMR Dragon/Transcription disclaimer:   Much of this encounter note is an electronic transcription/translation of spoken language to printed text.  The electronic translation of spoken language may permit erroneous, or at times, nonsensical words or phrases to be inadvertently transcribed; although attempts have made to review the note for such errors, some may still exist.

## 2021-04-10 NOTE — PROGRESS NOTES
Maureen Armas arrived to room # . Presented with: NSTEMI, confusion, fall at home. Mental Status: Patient is disoriented. Vitals:    04/10/21 1643   BP: (!) 159/73   Pulse: 92   Resp: 22   Temp: 98.2 °F (36.8 °C)   SpO2: 95%     Oriented Patient and Family to room. Call light within reach. Yes.     Electronically signed by Tyler Dyer RN on 4/10/2021 at 6:50 PM

## 2021-04-11 PROCEDURE — 2140000000 HC CCU INTERMEDIATE R&B

## 2021-04-11 PROCEDURE — 6360000002 HC RX W HCPCS: Performed by: INTERNAL MEDICINE

## 2021-04-11 PROCEDURE — 2580000003 HC RX 258: Performed by: INTERNAL MEDICINE

## 2021-04-11 PROCEDURE — 6370000000 HC RX 637 (ALT 250 FOR IP): Performed by: INTERNAL MEDICINE

## 2021-04-11 RX ORDER — LORAZEPAM 2 MG/ML
1 INJECTION INTRAMUSCULAR EVERY 4 HOURS
Status: DISCONTINUED | OUTPATIENT
Start: 2021-04-11 | End: 2021-04-11

## 2021-04-11 RX ORDER — LORAZEPAM 2 MG/ML
1 INJECTION INTRAMUSCULAR EVERY 4 HOURS PRN
Status: DISCONTINUED | OUTPATIENT
Start: 2021-04-11 | End: 2021-04-13

## 2021-04-11 RX ADMIN — HYDROCHLOROTHIAZIDE 25 MG: 25 TABLET ORAL at 11:47

## 2021-04-11 RX ADMIN — ATORVASTATIN CALCIUM 40 MG: 40 TABLET, FILM COATED ORAL at 19:20

## 2021-04-11 RX ADMIN — SODIUM CHLORIDE, PRESERVATIVE FREE 10 ML: 5 INJECTION INTRAVENOUS at 19:20

## 2021-04-11 RX ADMIN — ASPIRIN 81 MG: 81 TABLET, CHEWABLE ORAL at 11:47

## 2021-04-11 RX ADMIN — CLOPIDOGREL BISULFATE 75 MG: 75 TABLET ORAL at 11:48

## 2021-04-11 RX ADMIN — LORAZEPAM 1 MG: 2 INJECTION INTRAMUSCULAR; INTRAVENOUS at 02:55

## 2021-04-11 RX ADMIN — METOPROLOL SUCCINATE 25 MG: 25 TABLET, EXTENDED RELEASE ORAL at 11:48

## 2021-04-11 RX ADMIN — DONEPEZIL HYDROCHLORIDE 5 MG: 5 TABLET, FILM COATED ORAL at 19:19

## 2021-04-11 RX ADMIN — LOSARTAN POTASSIUM 25 MG: 25 TABLET, FILM COATED ORAL at 11:48

## 2021-04-11 RX ADMIN — LORAZEPAM 1 MG: 2 INJECTION INTRAMUSCULAR; INTRAVENOUS at 21:33

## 2021-04-11 ASSESSMENT — ENCOUNTER SYMPTOMS
GASTROINTESTINAL NEGATIVE: 1
RESPIRATORY NEGATIVE: 1
EYES NEGATIVE: 1
ALLERGIC/IMMUNOLOGIC NEGATIVE: 1

## 2021-04-11 NOTE — PROGRESS NOTES
[facility]    Patient:  Jasmyn Arroyo  YOB: 1929  Date of Service: 4/11/2021  MRN: [id number]   Acct: [de-identified]   Primary Care Physician: Severiano Hallman MD  Advance Directive: [facility]  Admit Date: 4/10/2021       Hospital Day: 1      CHIEF COMPLAINT:     Chief Complaint   Patient presents with   Mario Close       4/11/2021 11:50 AM  Subjective / Interval History:   04/11/2021  Patient seen and examined this AM.  No new complaints. Continues to be confused. Laying comfortably in bed in no acute distress. No acute changes or acute overnight event reported. Review of Systems:   Review of Systems   Unable to perform ROS: Dementia       .     Diet NPO Effective Now  No intake or output data in the 24 hours ending 04/11/21 1150    Medications:   sodium chloride       Current Facility-Administered Medications   Medication Dose Route Frequency Provider Last Rate Last Admin    LORazepam (ATIVAN) injection 1 mg  1 mg Intravenous Q4H PRN Rere Huff MD   1 mg at 04/11/21 0255    [START ON 4/12/2021] enoxaparin (LOVENOX) injection 40 mg  40 mg Subcutaneous Daily Jersey Medrano MD        donepezil (ARICEPT) tablet 5 mg  5 mg Oral Nightly Jersey Medrano MD        metoprolol succinate (TOPROL XL) extended release tablet 25 mg  25 mg Oral Daily Jersey Medrano MD   25 mg at 04/11/21 1148    hydroCHLOROthiazide (HYDRODIURIL) tablet 25 mg  25 mg Oral Daily Jersey Medrano MD   25 mg at 04/11/21 1147    losartan (COZAAR) tablet 25 mg  25 mg Oral Daily Jersey Medrano MD   25 mg at 04/11/21 1148    clopidogrel (PLAVIX) tablet 75 mg  75 mg Oral Daily Jersey Medrnao MD   75 mg at 04/11/21 1148    sodium chloride flush 0.9 % injection 5-40 mL  5-40 mL Intravenous 2 times per day Jersey Medrano MD        sodium chloride flush 0.9 % injection 10 mL  10 mL Intravenous PRN Jersey Medrano MD        0.9 % sodium chloride infusion  25 mL Intravenous PRN Gail Benson MD        famotidine (PEPCID) injection 20 mg  20 mg Intravenous Daily Gail Benson MD        promethazine (PHENERGAN) tablet 12.5 mg  12.5 mg Oral Q6H PRNEIDA Benson MD        Or    ondansetron [facility]) injection 4 mg  4 mg Intravenous Q6H PRNEIDA Benson MD        acetaminophen (TYLENOL) tablet 650 mg  650 mg Oral Q6H PRN Gail Benson MD        Or    acetaminophen (TYLENOL) suppository 650 mg  650 mg Rectal Q6H PRN Gail Benson MD        polyethylene glycol (GLYCOLAX) packet 17 g  17 g Oral Daily PRNEIDA Benson MD        aspirin chewable tablet 81 mg  81 mg Oral Daily Gail Benson MD   81 mg at 04/11/21 1147    atorvastatin (LIPITOR) tablet 40 mg  40 mg Oral Nightly Gail Benson MD        nitroGLYCERIN (NITROSTAT) SL tablet 0.4 mg  0.4 mg Sublingual Q5 Min PRNEIDA Benson MD             sodium chloride        [START ON 4/12/2021] enoxaparin  40 mg Subcutaneous Daily    donepezil  5 mg Oral Nightly    metoprolol succinate  25 mg Oral Daily    hydroCHLOROthiazide  25 mg Oral Daily    losartan  25 mg Oral Daily    clopidogrel  75 mg Oral Daily    sodium chloride flush  5-40 mL Intravenous 2 times per day    famotidine (PEPCID) injection  20 mg Intravenous Daily    aspirin  81 mg Oral Daily    atorvastatin  40 mg Oral Nightly     LORazepam, sodium chloride flush, sodium chloride, promethazine **OR** ondansetron, acetaminophen **OR** acetaminophen, polyethylene glycol, nitroGLYCERIN  Diet NPO Effective Now       Labs:   CBC with DIFF:  Recent Labs     04/10/21  1258   WBC 13.2*   RBC 5.09   HGB 14.6   HCT 44.4   MCV 87.2   MCH 28.7   MCHC 32.9*   RDW 13.2      MPV 9.2*   NEUTOPHILPCT 82.3*   LYMPHOPCT 8.9*   MONOPCT 7.8   EOSRELPCT 0.1   BASOPCT 0.4   NEUTROABS 10.9*   LYMPHSABS 1.2   MONOSABS 1.00*   EOSABS 0.00   BASOSABS 0.10       CMP/BMP:  Recent Labs     04/10/21  1258      K 4.8      CO2 24   ANIONGAP 12   GLUCOSE 90   BUN 20   CREATININE 0.6   LABGLOM >60   CALCIUM 9.3   PROT 6.3*   LABALBU 3.8   BILITOT 1.0   ALKPHOS 75   ALT 27   AST 89*         CRP:  No results for input(s): CRP in the last 72 hours. Sed Rate:  No results for input(s): SEDRATE in the last 72 hours. HgBA1c:  No components found for: HGBA1C  FLP:  No results found for: TRIG, HDL, LDLCALC, LDLDIRECT, LABVLDL  TSH:  No results found for: TSH  Troponin T:   Recent Labs     04/10/21  1503 04/10/21  1727 04/10/21  2051   TROPONINI 0.20* 0.24* 0.18*     Pro-BNP: No results for input(s): BNP in the last 72 hours. INR:   Recent Labs     04/10/21  1258   INR 1.02     ABGs: No results found for: PHART, PO2ART, SVW3YCP  UA:  Recent Labs     04/10/21  1215   COLORU YELLOW   PHUR 5.0   WBCUA 3   RBCUA 7*   BACTERIA NEGATIVE*   CLARITYU Clear   SPECGRAV 1.026   LEUKOCYTESUR Negative   UROBILINOGEN 1.0   BILIRUBINUR Negative   BLOODU Negative   GLUCOSEU Negative         Culture Results:    No results for input(s): CXSURG in the last 720 hours. Blood Culture Recent: No results for input(s): BC in the last 720 hours. Cultures:   No results for input(s): CULTURE in the last 72 hours. No results for input(s): BC, Andi Meiers in the last 72 hours. No results for input(s): CXSURG in the last 72 hours. No results for input(s): MG, PHOS in the last 72 hours. Recent Labs     04/10/21  1258   AST 89*   ALT 27   BILITOT 1.0   ALKPHOS 75         RAD:   Xr Elbow Left (min 3 Views)    Result Date: 4/10/2021  Exam:   XR ELBOW LEFT (MIN 3 VIEWS)  Date:  4/10/2021 History:  Female, age  80 years; fall COMPARISON:  None. Findings : Irregularity at the radial head, concerning for fracture of uncertain chronicity. Impression: Findings concerning for radial head fracture of uncertain chronicity.  Signed by Dr Krystina Guerrero on 4/10/2021 2:11 PM    Ct Head Wo Contrast    Result Date: 4/10/2021  EXAM: CT OF THE HEAD WITHOUT IV mGy-cm. Automated exposure control dose reduction technique was implemented. Findings : There is no acute displaced fracture. No dislocation. No suspicious lytic or blastic lesion. No radiopaque foreign body. Impression: No acute osseous pathology. Signed by Dr Delores Vasquez on 4/10/2021 1:17 PM    Xr Chest Portable    Result Date: 4/10/2021  Exam:   XR CHEST PORTABLE  Date:  4/10/2021 History:  Female, age  80 years; altered mental status. COMPARISON:  None. Findings : The heart and mediastinum are normal in size. Lungs are without focal infiltrate, mass or effusions. Calcified adenoma in the right midlung zone. Chronic interstitial lung changes. The bones show no acute pathology. Impression: No acute cardiopulmonary disease. Signed by Dr Delores Vasquez on 4/10/2021 2:09 PM      Objective:   Vitals:   BP (!) 148/76   Pulse 98   Temp 97.2 °F (36.2 °C) (Temporal)   Resp 20   Ht 5' (1.524 m)   Wt 105 lb 1.6 oz (47.7 kg)   SpO2 92%   BMI 20.53 kg/m²       Patient Vitals for the past 24 hrs:   BP Temp Temp src Pulse Resp SpO2 Weight   04/11/21 1025 (!) 148/76 97.2 °F (36.2 °C) Temporal 98 20 92 %    04/11/21 0030 (!) 161/75 97.4 °F (36.3 °C) Temporal 100 20     04/10/21 2341    100      04/10/21 2147 (!) 161/75 97.4 °F (36.3 °C)  100 20 92 %    04/10/21 1919 (!) 159/93 97.5 °F (36.4 °C)  98 18 93 %    04/10/21 1645       105 lb 1.6 oz (47.7 kg)   04/10/21 1643 (!) 159/73 98.2 °F (36.8 °C) Temporal 92 22 95 %    04/10/21 1600 (!) 145/75 98.2 °F (36.8 °C)  75 16 99 %    04/10/21 1415 (!) 150/77 98.5 °F (36.9 °C)  68 18 99 %        24HR INTAKE/OUTPUT:  No intake or output data in the 24 hours ending 04/11/21 1150    Physical Exam  Vitals signs and nursing note reviewed. Constitutional:       General: She is not in acute distress. Appearance: She is ill-appearing. She is not toxic-appearing or diaphoretic. HENT:      Head: Normocephalic and atraumatic.       Right Ear: External ear normal.      Left Ear: External ear normal.      Nose: Nose normal. No congestion or rhinorrhea. Mouth/Throat:      Mouth: Mucous membranes are moist.      Pharynx: Oropharynx is clear. No oropharyngeal exudate or posterior oropharyngeal erythema. Eyes:      General: No scleral icterus. Right eye: No discharge. Left eye: No discharge. Extraocular Movements: Extraocular movements intact. Conjunctiva/sclera: Conjunctivae normal.      Pupils: Pupils are equal, round, and reactive to light. Neck:      Musculoskeletal: Normal range of motion and neck supple. No neck rigidity or muscular tenderness. Vascular: No carotid bruit. Cardiovascular:      Rate and Rhythm: Normal rate and regular rhythm. Pulses: Normal pulses. Heart sounds: Normal heart sounds. No murmur. No friction rub. No gallop. Pulmonary:      Effort: Pulmonary effort is normal. No respiratory distress. Breath sounds: Normal breath sounds. No stridor. No wheezing, rhonchi or rales. Chest:      Chest wall: No tenderness. Abdominal:      General: Bowel sounds are normal. There is no distension. Palpations: Abdomen is soft. Tenderness: There is no abdominal tenderness. Musculoskeletal: Normal range of motion. General: No swelling, tenderness, deformity or signs of injury. Right lower leg: No edema. Left lower leg: No edema. Skin:     General: Skin is warm and dry. Capillary Refill: Capillary refill takes less than 2 seconds. Coloration: Skin is not jaundiced or pale. Findings: No bruising, erythema, lesion or rash. Neurological:      General: No focal deficit present. Mental Status: She is alert. She is disoriented. Cranial Nerves: No cranial nerve deficit. Sensory: No sensory deficit. Motor: No weakness.       Coordination: Coordination normal.   Psychiatric:         Mood and Affect: Mood normal.         Behavior: consulted from ED  · Patient's family/sister/POA reportedly does not want any extensive testing/work-up/procedures. · Heparin ggt  initially started on admission.,  However now discontinued  · 2D echocardiogram initially ordered, now discontinued  · Palliative care consulted, for discussion of goals of care.     Fall  Closed nondisplaced fracture of head of left radius  · CT head without contrast with no acute intracranial pathology  · CT cervical spine without contrast - No acute osseous posttraumatic findings  · CT pelvis without contrast - No acute osseous pathology  · Chest x-ray: No acute cardiopulmonary disease. · X-ray left elbow: Impression: Findings concerning for radial head fracture of uncertain chronicity   · Orthopedic surgery consulted from ED  · No acute surgical intervention recommended  · Continue symptomatic and supportive management  · Fall precautions  · SW for possible placement        Nontraumatic rhabdomyolysis  · Initial CK of 1,339 (04/10/2021)  · Gentle IV hydration   · Monitor CK level        History of HTN  · Continue home regimen  · Losartan 25 mg p.o. daily  · Hydrochlorothiazide 25 mg p.o. daily  · Metoprolol succinate 25 mg p.o. daily     History of dementia, with intermittent agitation  · Donepezil 5 mg p.o. nightly              Continue management of other chronic medical conditions - see above and orders. Advance Directive: DNR-CC    Diet NPO Effective Now         Consults Made:   IP CONSULT TO CARDIOLOGY  IP CONSULT TO ORTHOPEDIC SURGERY  IP CONSULT TO SOCIAL WORK  IP CONSULT TO PALLIATIVE CARE    DVT prophylaxis: Enoxaparin      Discharge planning: tbd    Time Spent is 25 mins in the examination, evaluation, counseling and review of medications, assessment and plan.      Electronically signed by   Pam Tim MD, MPH,   Internal Medicine Hospitalist   4/11/2021 11:50 AM

## 2021-04-11 NOTE — PROGRESS NOTES
Patients sister, Harvey Correa, brought in her POA and living will paperwork. Made a copy and placed them in her softchart. Informed her that social work does not get here until around 10 am today and her sister stated that she would come back then to speak with them.  Electronically signed by Chantal Giles RN on 4/11/2021 at 7:53 AM

## 2021-04-11 NOTE — PROGRESS NOTES
Per Dr Cornell May, patient can follow up outpatient regarding radial head fracture.    Electronically signed by Lew Goncalves RN on 4/10/2021 at 7:24 PM

## 2021-04-11 NOTE — PROGRESS NOTES
Patient has been very restless and pulling off telemetry. She has tried to get up with out help, is alert to person only. She has been very irritated at the staff this shift because she just wants to go to the movies. Have made multiple attempts to reorient the patient and she is still very confused, will not take anything oral at all. Will not allow chap stick to be applied to her lips and will not keep her gown on. Ativan requested from MD to help patient to calm down. Will give dose of ativan and continue to monitor.

## 2021-04-11 NOTE — PROGRESS NOTES
One ml IV ativan wasted with Marylou Bowman RN (from previous one-time dose given at 17:14).   Electronically signed by Lew Goncalves RN on 4/10/2021 at 8:15 PM

## 2021-04-11 NOTE — CARE COORDINATION
Spoke with sister who states her sister wants to be home and they are not interested in a facility at this time. Emailing sister a sitter list for 24 hour care, sister is also interested in hospice and home health services, as well as DME such as hospital bed!  Electronically signed by Hay Bolton on 4/11/2021 at 12:23 PM

## 2021-04-11 NOTE — PROGRESS NOTES
Notified hospice of the consult. Spoke with Neo Azevedo who stated she would get in touch with patients sister. Faxed facesheet, H&P, and notes from chart to hospice.  Electronically signed by Kitty Witt RN on 4/11/2021 at 2:15 PM

## 2021-04-11 NOTE — PROGRESS NOTES
Patient passed 3 oz swallow screen with nursing. Gave meds with applesauce after multiple attempts. Patient refused twice but then was agreeable to taking them. Patient is oriented to person only, but does recognize her sister at the bedside.  Electronically signed by Mart Andrade RN on 4/11/2021 at 12:03 PM

## 2021-04-11 NOTE — CONSULTS
[facility]  Cardiology Consult      Requesting MD:  Jersey Medrano MD   Admit Status:  Inpatient [101]       History obtained from:   [] Patient  [] Other (specify):     Patient:  Jasmyn Arroyo  [id number]     Chief Complaint:   Chief Complaint   Patient presents with   [facility] Fall       HPI:    Ms. Roberth Thakur, a 80 y.o. female with a history of HTN, HLD, presenting to [address] [place] [address] ED (04/10/2021), on account of an acute moderate-severe left elbow pain, from reported mechanical fall. Patient reported falling onto the pavement while she was checking on a chicken coop alarm. Reportedly crawled/pulled herself inside. Patient was reportedly found by her sister at the next day, having laid on the floor all night, and given patient lives by self. Abn troponin. Pt is dnr. No invasive work up. Review of Systems:  Review of Systems   Constitutional: Negative. HENT: Negative. Eyes: Negative. Respiratory: Negative. Cardiovascular: Negative. Gastrointestinal: Negative. Endocrine: Negative. Genitourinary: Negative. Musculoskeletal: Negative. Allergic/Immunologic: Negative. Neurological: Negative. Hematological: Negative. Psychiatric/Behavioral: Negative. All other systems reviewed and are negative.       Cardiac Specific Data:  Specialty Problems        Cardiology Problems    Cerebrovascular accident (CVA) [facility])        Hyperlipidemia        Essential hypertension        Venous insufficiency        * (Principal) NSTEMI (non-ST elevated myocardial infarction) [facility])              Past Medical History:  Past Medical History:   Diagnosis Date    Age-related osteoporosis without current pathological fracture 8/10/2017    Arthritis     BPV (benign positional vertigo)     Collagenous colitis 8/10/2017    Dupuytren's contracture of both hands 4/41/5933    Ehrlichiosis     Hyperlipidemia     Hypertensive crisis     Lumbar disc disease 8/10/2017    Memory loss     Meralgia paraesthetica     Other emphysema ([facility] [address] 2017    Primary osteoarthritis of knee 8/10/2017    Sensorineural hearing loss     Venous insufficiency     Venous stasis dermatitis of both lower extremities 2019    Vitamin D deficiency         Past Surgical History:  Past Surgical History:   Procedure Laterality Date    COLONOSCOPY      KNEE SURGERY Bilateral     total knee arthroplasty 11/10    TOE AMPUTATION Right 2017    TOE AMPUTATION 2ND DIGIT performed by Author CLARA Lema at [facility]       Past Family History:  Family History   Problem Relation Age of Onset    Atrial Fibrillation Sister     Stroke Sister         ischemic    Breast Cancer Sister [de-identified]       Past Social History:  Social History     Socioeconomic History    Marital status: Single     Spouse name: Not on file    Number of children: 0    Years of education: 15    Highest education level: Not on file   Occupational History    Occupation: retired   Social Needs    Financial resource strain: Not very hard    Food insecurity     Worry: Never true     Inability: Never true    Transportation needs     Medical: Not on file     Non-medical: Not on file   Tobacco Use    Smoking status: Former Smoker     Quit date: 1965     Years since quittin.1    Smokeless tobacco: Never Used   Substance and Sexual Activity    Alcohol use: No    Drug use: No    Sexual activity: Never   Lifestyle    Physical activity     Days per week: Not on file     Minutes per session: Not on file    Stress: Not on file   Relationships    Social connections     Talks on phone: Not on file     Gets together: Not on file     Attends Jewish service: Not on file     Active member of club or organization: Not on file     Attends meetings of clubs or organizations: Not on file     Relationship status: Not on file    Intimate partner violence     Fear of current or ex partner: Not on file     Emotionally abused: Not on file Physically abused: Not on file     Forced sexual activity: Not on file   Other Topics Concern    Not on file   Social History Narrative    Not on file       Allergies: Allergies   Allergen Reactions    Other      novocain doesn't remember side effects       Home Meds:  Prior to Admission medications    Medication Sig Start Date End Date Taking?  Authorizing Provider   diclofenac sodium (VOLTAREN) 1 % GEL Apply 4 g topically 4 times daily as needed for Pain 1/11/21   Severiano Hallman MD   donepezil (ARICEPT) 5 MG tablet TAKE 1 TABLET EVERY NIGHT 12/1/20   Severiano Hallman MD   hydroCHLOROthiazide (HYDRODIURIL) 25 MG tablet Take 1 tablet by mouth daily 3/3/20   Severiano Hallman MD   Apoaequorin (PREVAGEN) 10 MG CAPS Take by mouth    Historical Provider, MD   vitamin D (ERGOCALCIFEROL) 20663 units CAPS capsule TAKE ONE CAPSULE BY MOUTH ONCE WEEKLY 7/18/19   Severiano Hallman MD   furosemide (LASIX) 20 MG tablet Take 1 tablet by mouth daily as needed (edema) 7/18/19   Severiano Hallman MD   Multiple Vitamins-Minerals (PRESERVISION AREDS 2 PO) Take by mouth daily    Historical Provider, MD   metoprolol succinate (TOPROL XL) 25 MG extended release tablet TAKE 1 TABLET EVERY DAY FOR BLOOD PRESSURE 1/11/19   Joann Sykes MD   losartan (COZAAR) 25 MG tablet Take 1 tablet by mouth daily 1/11/19   Joann Sykes MD   clopidogrel (PLAVIX) 75 MG tablet Take 1 tablet by mouth daily 1/11/19   Joann Sykes MD   atorvastatin (LIPITOR) 20 MG tablet Take one pill daily 1/11/19   Joann Sykes MD   alendronate (FOSAMAX) 70 MG tablet TAKE 1 TABLET EVERY WEEK 1/11/19   Joann Sykes MD       Current Meds:   donepezil  5 mg Oral Nightly    metoprolol succinate  25 mg Oral Daily    hydroCHLOROthiazide  25 mg Oral Daily    losartan  25 mg Oral Daily    clopidogrel  75 mg Oral Daily    sodium chloride flush  5-40 mL Intravenous 2 times per day    famotidine (PEPCID) injection  20 mg Intravenous Daily    aspirin  81 mg Oral Daily    atorvastatin 40 mg Oral Nightly       Current Infused Meds:   sodium chloride         Physical Exam:  Vitals:    04/11/21 0030   BP: (!) 161/75   Pulse: 100   Resp: 20   Temp: 97.4 °F (36.3 °C)   SpO2:      No intake or output data in the 24 hours ending 04/11/21 1022  Estimated body mass index is 20.53 kg/m² as calculated from the following:    Height as of this encounter: 5' (1.524 m). Weight as of this encounter: 105 lb 1.6 oz (47.7 kg). Physical Exam  Vitals signs reviewed. Constitutional:       Appearance: Normal appearance. HENT:      Head: Normocephalic. Right Ear: Tympanic membrane normal.      Nose: Nose normal.      Mouth/Throat:      Mouth: Mucous membranes are moist.   Eyes:      Pupils: Pupils are equal, round, and reactive to light. Neck:      Musculoskeletal: Normal range of motion. Cardiovascular:      Rate and Rhythm: Normal rate and regular rhythm. Pulses: Normal pulses. Heart sounds: Normal heart sounds. Pulmonary:      Effort: Pulmonary effort is normal.   Abdominal:      General: Abdomen is flat. Musculoskeletal: Normal range of motion. Skin:     General: Skin is warm. Capillary Refill: Capillary refill takes less than 2 seconds. Neurological:      General: No focal deficit present. Mental Status: She is alert. Labs:  Recent Labs     04/10/21  1258   WBC 13.2*   HGB 14.6          Recent Labs     04/10/21  1258      K 4.8      CO2 24   BUN 20   CREATININE 0.6   LABGLOM >60   CALCIUM 9.3       CK, CKMB, Troponin: @LABRCNT (CKTOTAL:3, CKMB:3, TROPONINI:3)@    Last 3 BNP:  No results for input(s): BNP in the last 72 hours. IMAGING:  Xr Elbow Left (min 3 Views)    Result Date: 4/10/2021  Exam:   XR ELBOW LEFT (MIN 3 VIEWS)  Date:  4/10/2021 History:  Female, age  80 years; fall COMPARISON:  None. Findings : Irregularity at the radial head, concerning for fracture of uncertain chronicity.     Impression: Findings concerning for radial head fracture of uncertain chronicity. Signed by Dr Marlena Sacks on 4/10/2021 2:11 PM    Ct Head Wo Contrast    Result Date: 4/10/2021  EXAM: CT OF THE HEAD WITHOUT IV CONTRAST 4/10/2021 COMPARISON: Head CT dated Kendra 15, 2018. INDICATION: Female, 80years-old. History of stroke , fall, syncope PROCEDURE: Non contrast enhanced head CT was performed. Radiation dose equals DLP 1193 mGy-cm. Automated exposure control dose reduction technique was implemented. FINDINGS: Mild generalized cerebral and loss. Mild chronic microvascular changes. . There is no evidence of mass-effect or midline shift. The gray-white differentiation is preserved. There is no evidence of intracranial contusion, hemorrhage, or skull fracture. The visualized portions of the paranasal sinuses are free of obstructive mucosal disease. The visualized portions of the mastoid air cells are clear. There is a large mucous retention cysts in the left maxillary sinus. 1.   No acute intracranial abnormality. Signed by Dr Marlena Sacks on 4/10/2021 1:09 PM    Ct Cervical Spine Wo Contrast    Result Date: 4/10/2021  EXAMINATION: CT CERVICAL SPINE WITHOUT IV CONTRAST 4/10/2021 COMPARISON: CT cervical spine dated June 25, 2018. INDICATION: Female, 80years-old. Fall PROCEDURE: Multiple CT images of the cervical spine were obtained without IV contrast. Images were formatted in the axial, coronal and sagittal planes. FINDINGS: The odontoid process is well approximated with the anterior body of C1 and well aligned between the lateral masses of C1. Grade 1 anterolisthesis of C4 on C5 and C5 on C6. There is no acute compression deformity. No dislocation. Multilevel degenerative changes, with disc disease and facet arthropathy. Visualized posterior fossa demonstrates no acute findings. There is no paraspinal hematoma. 1.  No acute osseous posttraumatic findings.  Signed by Dr Marlena Sacks on 4/10/2021 1:14 PM    Ct Pelvis Wo Contrast Additional Contrast? None    Result Date: 4/10/2021  Exam:   CT PELVIS WO CONTRAST  Date:  4/10/2021 History:  Female, age  80 years; fall COMPARISON:  None. Radiation dose equals DLP 1085 mGy-cm. Automated exposure control dose reduction technique was implemented. Findings : There is no acute displaced fracture. No dislocation. No suspicious lytic or blastic lesion. No radiopaque foreign body. Impression: No acute osseous pathology. Signed by Dr Daquan Cheng on 4/10/2021 1:17 PM    Xr Chest Portable    Result Date: 4/10/2021  Exam:   XR CHEST PORTABLE  Date:  4/10/2021 History:  Female, age  80 years; altered mental status. COMPARISON:  None. Findings : The heart and mediastinum are normal in size. Lungs are without focal infiltrate, mass or effusions. Calcified adenoma in the right midlung zone. Chronic interstitial lung changes. The bones show no acute pathology. Impression: No acute cardiopulmonary disease. Signed by Dr Daquan Cheng on 4/10/2021 2:09 PM      Assessment:  1. nsteMI  2. Dementia  3. DNR  4. htn  5.  RHABDOMYOLYSIS      Recommendations:    Consider palliative care  Not a cath candidate  Supportive care,

## 2021-04-12 PROBLEM — E43 SEVERE MALNUTRITION (HCC): Status: ACTIVE | Noted: 2021-04-12

## 2021-04-12 LAB
EKG P AXIS: 54 DEGREES
EKG P AXIS: 58 DEGREES
EKG P-R INTERVAL: 136 MS
EKG P-R INTERVAL: 138 MS
EKG Q-T INTERVAL: 418 MS
EKG Q-T INTERVAL: 418 MS
EKG QRS DURATION: 72 MS
EKG QRS DURATION: 72 MS
EKG QTC CALCULATION (BAZETT): 437 MS
EKG QTC CALCULATION (BAZETT): 444 MS
EKG T AXIS: -57 DEGREES
EKG T AXIS: -71 DEGREES

## 2021-04-12 PROCEDURE — 2580000003 HC RX 258: Performed by: INTERNAL MEDICINE

## 2021-04-12 PROCEDURE — 6360000002 HC RX W HCPCS: Performed by: INTERNAL MEDICINE

## 2021-04-12 PROCEDURE — 93010 ELECTROCARDIOGRAM REPORT: CPT | Performed by: INTERNAL MEDICINE

## 2021-04-12 PROCEDURE — 2500000003 HC RX 250 WO HCPCS: Performed by: INTERNAL MEDICINE

## 2021-04-12 PROCEDURE — 6370000000 HC RX 637 (ALT 250 FOR IP): Performed by: INTERNAL MEDICINE

## 2021-04-12 PROCEDURE — 2140000000 HC CCU INTERMEDIATE R&B

## 2021-04-12 RX ORDER — ASPIRIN 81 MG/1
81 TABLET, CHEWABLE ORAL DAILY
Qty: 30 TABLET | Refills: 0 | Status: SHIPPED | OUTPATIENT
Start: 2021-04-13 | End: 2021-05-13

## 2021-04-12 RX ADMIN — CLOPIDOGREL BISULFATE 75 MG: 75 TABLET ORAL at 08:54

## 2021-04-12 RX ADMIN — LORAZEPAM 1 MG: 2 INJECTION INTRAMUSCULAR; INTRAVENOUS at 14:16

## 2021-04-12 RX ADMIN — LOSARTAN POTASSIUM 25 MG: 25 TABLET, FILM COATED ORAL at 08:54

## 2021-04-12 RX ADMIN — HYDROCHLOROTHIAZIDE 25 MG: 25 TABLET ORAL at 08:53

## 2021-04-12 RX ADMIN — SODIUM CHLORIDE, PRESERVATIVE FREE 10 ML: 5 INJECTION INTRAVENOUS at 21:22

## 2021-04-12 RX ADMIN — ASPIRIN 81 MG: 81 TABLET, CHEWABLE ORAL at 08:53

## 2021-04-12 RX ADMIN — METOPROLOL SUCCINATE 25 MG: 25 TABLET, EXTENDED RELEASE ORAL at 08:53

## 2021-04-12 RX ADMIN — FAMOTIDINE 20 MG: 10 INJECTION, SOLUTION INTRAVENOUS at 08:52

## 2021-04-12 RX ADMIN — SODIUM CHLORIDE, PRESERVATIVE FREE 10 ML: 5 INJECTION INTRAVENOUS at 08:52

## 2021-04-12 NOTE — PROGRESS NOTES
Attempted to call pt's sister, Fartun Whitehead, to see if equipment had been delivered but no answer received and unable to leave voicemail.  Electronically signed by Yosef Elizondo RN on 4/12/2021 at 3:15 PM

## 2021-04-12 NOTE — CARE COORDINATION
No family at bedside, so SW attempted to contact the pt's sister/POA. Sister's spouse answered the phone and stating sister was on her way back to [facility]. SW reviewed notes from hospice Chaplain Xochitl Soto that state pt is to dc home with 24/7 CGs the sister is arranging through [employer]. The home DME has been ordered and is scheduled to be delivered and assembled today. Once the pt's DME has been delivered the pt can dc. SW confirmed with pt's RN Domo Gómez.

## 2021-04-12 NOTE — PROGRESS NOTES
Comprehensive Nutrition Assessment    Type and Reason for Visit:  Initial, Positive Nutrition Screen    Nutrition Recommendations/Plan: follow for advanced diet    Nutrition Assessment:  Pt meet criteria for severe malnutrition AEB fat and muscle mass. Pt is NPO and am aware of Hospice consult    Malnutrition Assessment:  Malnutrition Status:  Severe malnutrition    Context:  Acute Illness     Findings of the 6 clinical characteristics of malnutrition:  Energy Intake:  Mild decrease in energy intake (Comment)  Weight Loss:  No significant weight loss     Body Fat Loss:  7 - Moderate body fat loss     Muscle Mass Loss:  7 - Moderate muscle mass loss    Fluid Accumulation:  1 - Mild Extremities   Strength:  Not Performed    Estimated Daily Nutrient Needs:  Energy (kcal):  969-1431 kcals (20-30 kcals/kg);  Weight Used for Energy Requirements:        Protein (g):  48-96g; Weight Used for Protein Requirements:  Current        Fluid (ml/day):  969-1431 ml; Method Used for Fluid Requirements:  1 ml/kcal      Nutrition Related Findings:         Wounds:  None       Current Nutrition Therapies:    Diet NPO Effective Now    Anthropometric Measures:  · Height: 5' (152.4 cm)  · Current Body Weight: 104 lb 8 oz (47.4 kg)   · Admission Body Weight: 103 lb (46.7 kg)(stated)    · Usual Body Weight: 109 lb (49.4 kg)(1/2021)     · Ideal Body Weight: 100 lbs; % Ideal Body Weight 104.5 %   · BMI: 20.4  · BMI Categories: Underweight (BMI less than 22) age over 72       Nutrition Diagnosis:   · Severe malnutrition related to inadequate protein-energy intake as evidenced by NPO or clear liquid status due to medical condition, weight loss, severe muscle loss, severe loss of subcutaneous fat      Nutrition Interventions:   Food and/or Nutrient Delivery:  Continue NPO  Nutrition Education/Counseling:  No recommendation at this time   Coordination of Nutrition Care:  Continue to monitor while inpatient    Goals:  comfort measures

## 2021-04-12 NOTE — PROGRESS NOTES
Pt very agitated. Pt yelling and throwing items out of room and pt also removed telemetry box. Attempted to calm pt and replace telemetry box unsuccessfully. Prn ativan administered per order. Pt currently in bed yelling. Bed alarm on and room to door open.  Electronically signed by Gil Marley RN on 4/12/2021 at 2:32 PM

## 2021-04-12 NOTE — PROGRESS NOTES
[facility] Progress Note    Patient:  Roger Scott  YOB: 1929  Date of Service: 4/12/2021  MRN: [id number]   Acct: [de-identified]   Primary Care Physician: Rui Jain MD  Advance Directive: [facility]  Admit Date: 4/10/2021       Hospital Day: 2      CHIEF COMPLAINT:     Chief Complaint   Patient presents with   Cassidy Bui       4/12/2021 8:20 AM  Subjective / Interval History:   04/12/2021  No acute changes or acute overnight event reported. Patient seen and examined this AM.  Doing well. Laying comfortably in bed in no apparent acute distress. Family at bedside. Patient's family/sister/POA reportedly does not want any extensive testing/work-up/procedures. Patient seen by hospice team.  Pending home hospice equipment set up for discharge. 04/11/2021  Patient seen and examined this AM.  No new complaints. Continues to be confused. Laying comfortably in bed in no acute distress. No acute changes or acute overnight event reported. Review of Systems:   Review of Systems   Unable to perform ROS: Dementia       .     Diet NPO Effective Now    Intake/Output Summary (Last 24 hours) at 4/12/2021 0820  Last data filed at 4/11/2021 1920  Gross per 24 hour   Intake 3 ml   Output    Net 3 ml       Medications:   sodium chloride       Current Facility-Administered Medications   Medication Dose Route Frequency Provider Last Rate Last Admin    LORazepam (ATIVAN) injection 1 mg  1 mg Intravenous Q4H PRN Baron Meredith MD   1 mg at 04/11/21 2133    enoxaparin (LOVENOX) injection 40 mg  40 mg Subcutaneous Daily Jana Trejo MD        donepezil (ARICEPT) tablet 5 mg  5 mg Oral Nightly Jana Trejo MD   5 mg at 04/11/21 1919    metoprolol succinate (TOPROL XL) extended release tablet 25 mg  25 mg Oral Daily Jana Trejo MD   25 mg at 04/11/21 1148    hydroCHLOROthiazide (HYDRODIURIL) tablet 25 mg  25 mg Oral Daily Jana Trejo MD   25 mg at 04/11/21 1147  losartan (COZAAR) tablet 25 mg  25 mg Oral Daily Mervin Lozano MD   25 mg at 04/11/21 1148    clopidogrel (PLAVIX) tablet 75 mg  75 mg Oral Daily Mervin Lozano MD   75 mg at 04/11/21 1148    sodium chloride flush 0.9 % injection 5-40 mL  5-40 mL Intravenous 2 times per day Mervin Lozano MD   10 mL at 04/11/21 1920    sodium chloride flush 0.9 % injection 10 mL  10 mL Intravenous PRN Mervin Lozano MD        0.9 % sodium chloride infusion  25 mL Intravenous PRN Mervin Lozano MD        famotidine (PEPCID) injection 20 mg  20 mg Intravenous Daily Mervin Lozano MD        promethazine (PHENERGAN) tablet 12.5 mg  12.5 mg Oral Q6H PRN Mervin Lozano MD        Or    ondansetron [facility]) injection 4 mg  4 mg Intravenous Q6H PRN Mervin Lozano MD        acetaminophen (TYLENOL) tablet 650 mg  650 mg Oral Q6H PRN Mervin Lozano MD        Or   [facility] acetaminophen (TYLENOL) suppository 650 mg  650 mg Rectal Q6H PRN Mervin Lozano MD        polyethylene glycol (GLYCOLAX) packet 17 g  17 g Oral Daily PRN Mervin Lozano MD        aspirin chewable tablet 81 mg  81 mg Oral Daily Mervin Lozano MD   81 mg at 04/11/21 1147    atorvastatin (LIPITOR) tablet 40 mg  40 mg Oral Nightly Mervin Lozano MD   40 mg at 04/11/21 1920    nitroGLYCERIN (NITROSTAT) SL tablet 0.4 mg  0.4 mg Sublingual Q5 Min PRN Mervin Lozano MD             sodium chloride        enoxaparin  40 mg Subcutaneous Daily    donepezil  5 mg Oral Nightly    metoprolol succinate  25 mg Oral Daily    hydroCHLOROthiazide  25 mg Oral Daily    losartan  25 mg Oral Daily    clopidogrel  75 mg Oral Daily    sodium chloride flush  5-40 mL Intravenous 2 times per day    famotidine (PEPCID) injection  20 mg Intravenous Daily    aspirin  81 mg Oral Daily    atorvastatin  40 mg Oral Nightly     LORazepam, sodium chloride flush, sodium chloride, promethazine **OR** ondansetron, acetaminophen **OR** acetaminophen, polyethylene glycol, nitroGLYCERIN  Diet NPO Effective Now       Labs:   CBC with DIFF:  Recent Labs     04/10/21  1258   WBC 13.2*   RBC 5.09   HGB 14.6   HCT 44.4   MCV 87.2   MCH 28.7   MCHC 32.9*   RDW 13.2      MPV 9.2*   NEUTOPHILPCT 82.3*   LYMPHOPCT 8.9*   MONOPCT 7.8   EOSRELPCT 0.1   BASOPCT 0.4   NEUTROABS 10.9*   LYMPHSABS 1.2   MONOSABS 1.00*   EOSABS 0.00   BASOSABS 0.10       CMP/BMP:  Recent Labs     04/10/21  1258      K 4.8      CO2 24   ANIONGAP 12   GLUCOSE 90   BUN 20   CREATININE 0.6   LABGLOM >60   CALCIUM 9.3   PROT 6.3*   LABALBU 3.8   BILITOT 1.0   ALKPHOS 75   ALT 27   AST 89*         CRP:  No results for input(s): CRP in the last 72 hours. Sed Rate:  No results for input(s): SEDRATE in the last 72 hours. HgBA1c:  No components found for: HGBA1C  FLP:  No results found for: TRIG, HDL, LDLCALC, LDLDIRECT, LABVLDL  TSH:  No results found for: TSH  Troponin T:   Recent Labs     04/10/21  1503 04/10/21  1727 04/10/21  2051   TROPONINI 0.20* 0.24* 0.18*     Pro-BNP: No results for input(s): BNP in the last 72 hours. INR:   Recent Labs     04/10/21  1258   INR 1.02     ABGs: No results found for: PHART, PO2ART, WNH2WKR  UA:  Recent Labs     04/10/21  1215   COLORU YELLOW   PHUR 5.0   WBCUA 3   RBCUA 7*   BACTERIA NEGATIVE*   CLARITYU Clear   SPECGRAV 1.026   LEUKOCYTESUR Negative   UROBILINOGEN 1.0   BILIRUBINUR Negative   BLOODU Negative   GLUCOSEU Negative         Culture Results:    No results for input(s): CXSURG in the last 720 hours. Blood Culture Recent: No results for input(s): BC in the last 720 hours. Cultures:   No results for input(s): CULTURE in the last 72 hours. No results for input(s): BC, Rola Estevan in the last 72 hours. No results for input(s): CXSURG in the last 72 hours. No results for input(s): MG, PHOS in the last 72 hours.   Recent Labs     04/10/21  1258   AST 89*   ALT 27   BILITOT 1.0   ALKPHOS 75         RAD: Xr Elbow Left (min 3 Views)    Result Date: 4/10/2021  Exam:   XR ELBOW LEFT (MIN 3 VIEWS)  Date:  4/10/2021 History:  Female, age  80 years; fall COMPARISON:  None. Findings : Irregularity at the radial head, concerning for fracture of uncertain chronicity. Impression: Findings concerning for radial head fracture of uncertain chronicity. Signed by Dr Marlena Sacks on 4/10/2021 2:11 PM    Ct Head Wo Contrast    Result Date: 4/10/2021  EXAM: CT OF THE HEAD WITHOUT IV CONTRAST 4/10/2021 COMPARISON: Head CT dated Kendra 15, 2018. INDICATION: Female, 80years-old. History of stroke , fall, syncope PROCEDURE: Non contrast enhanced head CT was performed. Radiation dose equals DLP 1193 mGy-cm. Automated exposure control dose reduction technique was implemented. FINDINGS: Mild generalized cerebral and loss. Mild chronic microvascular changes. . There is no evidence of mass-effect or midline shift. The gray-white differentiation is preserved. There is no evidence of intracranial contusion, hemorrhage, or skull fracture. The visualized portions of the paranasal sinuses are free of obstructive mucosal disease. The visualized portions of the mastoid air cells are clear. There is a large mucous retention cysts in the left maxillary sinus. 1.   No acute intracranial abnormality. Signed by Dr Marlena Sacks on 4/10/2021 1:09 PM    Ct Cervical Spine Wo Contrast    Result Date: 4/10/2021  EXAMINATION: CT CERVICAL SPINE WITHOUT IV CONTRAST 4/10/2021 COMPARISON: CT cervical spine dated June 25, 2018. INDICATION: Female, 80years-old. Fall PROCEDURE: Multiple CT images of the cervical spine were obtained without IV contrast. Images were formatted in the axial, coronal and sagittal planes. FINDINGS: The odontoid process is well approximated with the anterior body of C1 and well aligned between the lateral masses of C1. Grade 1 anterolisthesis of C4 on C5 and C5 on C6. There is no acute compression deformity. No dislocation. Multilevel degenerative changes, with disc disease and facet arthropathy. Visualized posterior fossa demonstrates no acute findings. There is no paraspinal hematoma. 1.  No acute osseous posttraumatic findings. Signed by Dr Roel Grimm on 4/10/2021 1:14 PM    Ct Pelvis Wo Contrast Additional Contrast? None    Result Date: 4/10/2021  Exam:   CT PELVIS WO CONTRAST  Date:  4/10/2021 History:  Female, age  80 years; fall COMPARISON:  None. Radiation dose equals DLP 1085 mGy-cm. Automated exposure control dose reduction technique was implemented. Findings : There is no acute displaced fracture. No dislocation. No suspicious lytic or blastic lesion. No radiopaque foreign body. Impression: No acute osseous pathology. Signed by Dr Roel Grimm on 4/10/2021 1:17 PM    Xr Chest Portable    Result Date: 4/10/2021  Exam:   XR CHEST PORTABLE  Date:  4/10/2021 History:  Female, age  80 years; altered mental status. COMPARISON:  None. Findings : The heart and mediastinum are normal in size. Lungs are without focal infiltrate, mass or effusions. Calcified adenoma in the right midlung zone. Chronic interstitial lung changes. The bones show no acute pathology. Impression: No acute cardiopulmonary disease.  Signed by Dr Roel Grimm on 4/10/2021 2:09 PM      Objective:   Vitals:   /71   Pulse 93   Temp 97.7 °F (36.5 °C) (Temporal)   Resp 18   Ht 5' (1.524 m)   Wt 104 lb 8 oz (47.4 kg)   SpO2 93%   BMI 20.41 kg/m²       Patient Vitals for the past 24 hrs:   BP Temp Temp src Pulse Resp SpO2 Weight   04/12/21 0746       104 lb 8 oz (47.4 kg)   04/12/21 0600 131/71 97.7 °F (36.5 °C) Temporal 93 18 93 %    04/11/21 2345 136/83 98.3 °F (36.8 °C) Temporal 97 20     04/11/21 2147    97      04/11/21 1839 136/83 98.3 °F (36.8 °C)  97 20 93 %    04/11/21 1428 (!) 140/78 97 °F (36.1 °C) Temporal 100 18 92 %    04/11/21 1025 (!) 148/76 97.2 °F (36.2 °C) Temporal 98 20 92 %        24HR INTAKE/OUTPUT:      Intake/Output Summary (Last 24 hours) at 4/12/2021 0820  Last data filed at 4/11/2021 1920  Gross per 24 hour   Intake 3 ml   Output    Net 3 ml       Physical Exam  Vitals signs and nursing note reviewed. Constitutional:       General: She is not in acute distress. Appearance: She is ill-appearing. She is not toxic-appearing or diaphoretic. HENT:      Head: Normocephalic and atraumatic. Right Ear: External ear normal.      Left Ear: External ear normal.      Nose: Nose normal. No congestion or rhinorrhea. Mouth/Throat:      Mouth: Mucous membranes are moist.      Pharynx: Oropharynx is clear. No oropharyngeal exudate or posterior oropharyngeal erythema. Eyes:      General: No scleral icterus. Right eye: No discharge. Left eye: No discharge. Extraocular Movements: Extraocular movements intact. Conjunctiva/sclera: Conjunctivae normal.      Pupils: Pupils are equal, round, and reactive to light. Neck:      Musculoskeletal: Normal range of motion and neck supple. No neck rigidity or muscular tenderness. Vascular: No carotid bruit. Cardiovascular:      Rate and Rhythm: Normal rate and regular rhythm. Pulses: Normal pulses. Heart sounds: Normal heart sounds. No murmur. No friction rub. No gallop. Pulmonary:      Effort: Pulmonary effort is normal. No respiratory distress. Breath sounds: Normal breath sounds. No stridor. No wheezing, rhonchi or rales. Chest:      Chest wall: No tenderness. Abdominal:      General: Bowel sounds are normal. There is no distension. Palpations: Abdomen is soft. Tenderness: There is no abdominal tenderness. Musculoskeletal: Normal range of motion. General: No swelling, tenderness, deformity or signs of injury. Right lower leg: No edema. Left lower leg: No edema. Skin:     General: Skin is warm and dry.       Capillary Refill: Capillary refill takes less than 2 seconds. Coloration: Skin is not jaundiced or pale. Findings: No bruising, erythema, lesion or rash. Neurological:      General: No focal deficit present. Mental Status: She is alert. She is disoriented. Cranial Nerves: No cranial nerve deficit. Sensory: No sensory deficit. Motor: No weakness. Coordination: Coordination normal.   Psychiatric:         Mood and Affect: Mood normal.         Behavior: Behavior normal.       Assessment/plan:         Hospital Problems           Last Modified POA    * (Principal) NSTEMI (non-ST elevated myocardial infarction) ([address] 4/10/2021 Yes    Closed nondisplaced fracture of head of left radius 4/10/2021 Yes    Non-traumatic rhabdomyolysis 4/10/2021 Yes    Fall 4/10/2021 Yes    Dementia associated with other underlying disease without behavioral disturbance ([address] 4/10/2021 Yes          Principal Problem:    NSTEMI (non-ST elevated myocardial infarction) ([address]  Active Problems:    Closed nondisplaced fracture of head of left radius    Non-traumatic rhabdomyolysis    Fall    Dementia associated with other underlying disease without behavioral disturbance ([address]  Resolved Problems:    * No resolved hospital problems. *        Brief Summary  Ms. Hugo Kitchen, a 80 y.o. female with a history of HTN, HLD, presenting to [address] [place] [address] ED (04/10/2021), on account of an acute moderate-severe left elbow pain, from reported mechanical fall. Patient reported falling onto the pavement while she was checking on a chicken coop alarm. Reportedly crawled/pulled herself inside. Patient was reportedly found by her sister at the next day, having laid on the floor all night, and given patient lives by self.     Initial work-up in the ED significant for;  CT head, cervical spine, pelvis, as well as chest x-ray, with no acute findings.   X-ray left elbow, concerning for radial head fracture of uncertain chronicity   Elevated CK level: 1,339   Elevated troponin: 0.34  Elevated proBNP: 5,818     Cardiology and Orthopedist consulted from ER.     Patient admitted for further work-up and management          NSTEMI  Elevated proBNP  · Initial ProBNP: 8,177  · - Initial troponin on 0.34 --> 0.20 --> 0.24 --> 0.18 (04/10/2021)  · - Serial EKGs for chest pain  · - Optimized medical management  · --> Nitro glycerin sublingual when necessary for chest pain  · - Continue to monitor on telemetry  · - Famotidine   · - Cardiology consulted from ED  · Patient's family/sister/POA reportedly does not want any extensive testing/work-up/procedures. · Heparin ggt  initially started on admission.,  However now discontinued  · 2D echocardiogram initially ordered, now discontinued  · Palliative care consulted, for discussion of goals of care.     Fall  Closed nondisplaced fracture of head of left radius  · CT head without contrast with no acute intracranial pathology  · CT cervical spine without contrast - No acute osseous posttraumatic findings  · CT pelvis without contrast - No acute osseous pathology  · Chest x-ray: No acute cardiopulmonary disease. · X-ray left elbow: Impression: Findings concerning for radial head fracture of uncertain chronicity   · Orthopedic surgery consulted from ED  · No acute surgical intervention recommended  · Continue symptomatic and supportive management  · Fall precautions  · SW for possible placement        Nontraumatic rhabdomyolysis  · Initial CK of 1,339 (04/10/2021)  · Gentle IV hydration   · Monitor CK level        History of HTN  · Continue home regimen  · Losartan 25 mg p.o. daily  · Hydrochlorothiazide 25 mg p.o. daily  · Metoprolol succinate 25 mg p.o. daily     History of dementia, with intermittent agitation  · Donepezil 5 mg p.o. nightly              Continue management of other chronic medical conditions - see above and orders.             Advance Directive: DNR-CC    Diet NPO Effective Now         Consults Made:   IP CONSULT TO CARDIOLOGY  IP CONSULT TO ORTHOPEDIC SURGERY  IP CONSULT TO SOCIAL WORK  IP CONSULT TO PALLIATIVE CARE  IP CONSULT TO HOSPICE    DVT prophylaxis: Enoxaparin      Discharge planning:   Patient's family/sister/POA reportedly does not want any extensive testing/work-up/procedures. Patient seen by hospice team.  Pending home hospice equipment set up for discharge. Time Spent is 25 mins in the examination, evaluation, counseling and review of medications, assessment and plan.      Electronically signed by   Katherine Nair MD, MPH,   Internal Medicine Hospitalist   4/12/2021 8:20 AM

## 2021-04-12 NOTE — PROGRESS NOTES
The pt and her sister states they do want the pt to receive home hospice services. The needed equipment has been ordered and will be set up today. The sister is working on arranging 24/7 sitters with [address] [place] [address]. It will be ok to dc the pt after the equipment is set up and after sitters in place. The sister will notify the pts nurse when it is ok to dc. Emotional and sc support provided.

## 2021-04-12 NOTE — PROGRESS NOTES
Consult received, pt to discharge home with hospice care today. No needs at this time. I will follow loosely and assist should GOC change. Thank you for consulting palliative care.

## 2021-04-13 LAB
ANION GAP SERPL CALCULATED.3IONS-SCNC: 13 MMOL/L (ref 7–19)
BASOPHILS ABSOLUTE: 0 K/UL (ref 0–0.2)
BASOPHILS RELATIVE PERCENT: 0.4 % (ref 0–1)
BUN BLDV-MCNC: 14 MG/DL (ref 8–23)
CALCIUM SERPL-MCNC: 9.8 MG/DL (ref 8.2–9.6)
CHLORIDE BLD-SCNC: 96 MMOL/L (ref 98–111)
CO2: 27 MMOL/L (ref 22–29)
CREAT SERPL-MCNC: 0.5 MG/DL (ref 0.5–0.9)
EOSINOPHILS ABSOLUTE: 0.1 K/UL (ref 0–0.6)
EOSINOPHILS RELATIVE PERCENT: 0.7 % (ref 0–5)
GFR AFRICAN AMERICAN: >59
GFR NON-AFRICAN AMERICAN: >60
GLUCOSE BLD-MCNC: 96 MG/DL (ref 74–109)
HCT VFR BLD CALC: 49.2 % (ref 37–47)
HEMOGLOBIN: 15.9 G/DL (ref 12–16)
IMMATURE GRANULOCYTES #: 0.1 K/UL
LYMPHOCYTES ABSOLUTE: 1 K/UL (ref 1.1–4.5)
LYMPHOCYTES RELATIVE PERCENT: 11.3 % (ref 20–40)
MCH RBC QN AUTO: 28 PG (ref 27–31)
MCHC RBC AUTO-ENTMCNC: 32.3 G/DL (ref 33–37)
MCV RBC AUTO: 86.8 FL (ref 81–99)
MONOCYTES ABSOLUTE: 0.9 K/UL (ref 0–0.9)
MONOCYTES RELATIVE PERCENT: 9.6 % (ref 0–10)
NEUTROPHILS ABSOLUTE: 7.1 K/UL (ref 1.5–7.5)
NEUTROPHILS RELATIVE PERCENT: 77.5 % (ref 50–65)
PDW BLD-RTO: 12.7 % (ref 11.5–14.5)
PLATELET # BLD: 231 K/UL (ref 130–400)
PMV BLD AUTO: 9.3 FL (ref 9.4–12.3)
POTASSIUM REFLEX MAGNESIUM: 4.9 MMOL/L (ref 3.5–5)
RBC # BLD: 5.67 M/UL (ref 4.2–5.4)
SODIUM BLD-SCNC: 136 MMOL/L (ref 136–145)
TOTAL CK: 286 U/L (ref 26–192)
WBC # BLD: 9.2 K/UL (ref 4.8–10.8)

## 2021-04-13 PROCEDURE — 80048 BASIC METABOLIC PNL TOTAL CA: CPT

## 2021-04-13 PROCEDURE — 36415 COLL VENOUS BLD VENIPUNCTURE: CPT

## 2021-04-13 PROCEDURE — 82550 ASSAY OF CK (CPK): CPT

## 2021-04-13 PROCEDURE — 2140000000 HC CCU INTERMEDIATE R&B

## 2021-04-13 PROCEDURE — 2580000003 HC RX 258: Performed by: INTERNAL MEDICINE

## 2021-04-13 PROCEDURE — 85025 COMPLETE CBC W/AUTO DIFF WBC: CPT

## 2021-04-13 PROCEDURE — 92610 EVALUATE SWALLOWING FUNCTION: CPT

## 2021-04-13 PROCEDURE — 6370000000 HC RX 637 (ALT 250 FOR IP): Performed by: INTERNAL MEDICINE

## 2021-04-13 RX ORDER — LORAZEPAM 0.5 MG/1
0.5 TABLET ORAL EVERY 6 HOURS PRN
Status: DISCONTINUED | OUTPATIENT
Start: 2021-04-13 | End: 2021-04-14 | Stop reason: HOSPADM

## 2021-04-13 RX ADMIN — ASPIRIN 81 MG: 81 TABLET, CHEWABLE ORAL at 12:33

## 2021-04-13 RX ADMIN — LOSARTAN POTASSIUM 25 MG: 25 TABLET, FILM COATED ORAL at 12:34

## 2021-04-13 RX ADMIN — CLOPIDOGREL BISULFATE 75 MG: 75 TABLET ORAL at 12:33

## 2021-04-13 RX ADMIN — HYDROCHLOROTHIAZIDE 25 MG: 25 TABLET ORAL at 12:33

## 2021-04-13 RX ADMIN — METOPROLOL SUCCINATE 25 MG: 25 TABLET, EXTENDED RELEASE ORAL at 12:34

## 2021-04-13 RX ADMIN — SODIUM CHLORIDE, PRESERVATIVE FREE 10 ML: 5 INJECTION INTRAVENOUS at 10:31

## 2021-04-13 ASSESSMENT — PAIN SCALES - GENERAL: PAINLEVEL_OUTOF10: 0

## 2021-04-13 ASSESSMENT — PAIN SCALES - WONG BAKER: WONGBAKER_NUMERICALRESPONSE: 0

## 2021-04-13 NOTE — CARE COORDINATION
Pt and dtr present at bedside report pt offered a private room and accepted from [facility]. SW is awaiting confirmation from the facility.      [facility] [phone]

## 2021-04-13 NOTE — PROGRESS NOTES
Pt's sister Laura Ambrosio asked to speak with me from the doorway. This  stepped into the pt's room to speak with her. Pt's sister Nancy Savage asked about pt going home with Hospice and asked to speak with [facility] . This  called Virgilio Avila and he spoke with pt's sister, Nancy Savage. Also provided spiritual care with mediation, support, and prayer. Pt's sister expressed gratitude for spiritual care.     Electronically signed by Alfie Bowen on 4/13/2021 at 1:36 PM

## 2021-04-13 NOTE — PROGRESS NOTES
[facility]    Patient:  Yaquelin Horta  YOB: 1929  Date of Service: 4/13/2021  MRN: [id number]   Acct: [de-identified]   Primary Care Physician: Lopez Barahona MD  Advance Directive: [facility]  Admit Date: 4/10/2021       Hospital Day: 3      CHIEF COMPLAINT:     Chief Complaint   Patient presents with   Patmadiha Pinch       4/13/2021 9:16 AM  Subjective / Interval History:   04/13/2021  Patient seen and examined this AM.  No new complaints. No acute changes or acute overnight event reported. Patient continues to be confused. Laying comfortably in bed however in no acute distress. 04/12/2021  No acute changes or acute overnight event reported. Patient seen and examined this AM.  Doing well. Laying comfortably in bed in no apparent acute distress. Family at bedside. Patient's family/sister/POA reportedly does not want any extensive testing/work-up/procedures. Patient seen by hospice team.  Pending home hospice equipment set up for discharge. 04/11/2021  Patient seen and examined this AM.  No new complaints. Continues to be confused. Laying comfortably in bed in no acute distress. No acute changes or acute overnight event reported. Review of Systems:   Review of Systems   Unable to perform ROS: Dementia       .     Diet NPO Effective Now    Intake/Output Summary (Last 24 hours) at 4/13/2021 0916  Last data filed at 4/12/2021 1515  Gross per 24 hour   Intake    Output 350 ml   Net -350 ml       Medications:   sodium chloride       Current Facility-Administered Medications   Medication Dose Route Frequency Provider Last Rate Last Admin    LORazepam (ATIVAN) injection 1 mg  1 mg Intravenous Q4H PRN Domi Solorzano MD   1 mg at 04/12/21 1416    enoxaparin (LOVENOX) injection 40 mg  40 mg Subcutaneous Daily Los Valentin MD        donepezil (ARICEPT) tablet 5 mg  5 mg Oral Nightly Los Valentin MD   5 mg at 04/11/21 1919    metoprolol succinate (TOPROL XL) extended release tablet 25 mg  25 mg Oral Daily Rolo Johnson MD   25 mg at 04/12/21 0853    hydroCHLOROthiazide (HYDRODIURIL) tablet 25 mg  25 mg Oral Daily Rolo Johnson MD   25 mg at 04/12/21 0853    losartan (COZAAR) tablet 25 mg  25 mg Oral Daily Rolo Johnson MD   25 mg at 04/12/21 0854    clopidogrel (PLAVIX) tablet 75 mg  75 mg Oral Daily Rolo Johnson MD   75 mg at 04/12/21 0854    sodium chloride flush 0.9 % injection 5-40 mL  5-40 mL Intravenous 2 times per day Rolo Johnson MD   10 mL at 04/12/21 2122    sodium chloride flush 0.9 % injection 10 mL  10 mL Intravenous PRN Rolo Johnson MD        0.9 % sodium chloride infusion  25 mL Intravenous PRN Rolo Johnson MD        famotidine (PEPCID) injection 20 mg  20 mg Intravenous Daily Rolo Johnson MD   20 mg at 04/12/21 0852    promethazine (PHENERGAN) tablet 12.5 mg  12.5 mg Oral Q6H PRN Rolo Johnson MD        Or    ondansetron [facility] injection 4 mg  4 mg Intravenous Q6H PRN Rolo Johnson MD        acetaminophen (TYLENOL) tablet 650 mg  650 mg Oral Q6H PRN Rolo Johnson MD        Or    acetaminophen (TYLENOL) suppository 650 mg  650 mg Rectal Q6H PRN Rolo Johnson MD        polyethylene glycol (GLYCOLAX) packet 17 g  17 g Oral Daily PRN Rolo Johnson MD        aspirin chewable tablet 81 mg  81 mg Oral Daily Rolo Johnson MD   81 mg at 04/12/21 0853    atorvastatin (LIPITOR) tablet 40 mg  40 mg Oral Nightly Rolo Johnson MD   40 mg at 04/11/21 1920    nitroGLYCERIN (NITROSTAT) SL tablet 0.4 mg  0.4 mg Sublingual Q5 Min PRN Rolo Johnson MD             sodium chloride        enoxaparin  40 mg Subcutaneous Daily    donepezil  5 mg Oral Nightly    metoprolol succinate  25 mg Oral Daily    hydroCHLOROthiazide  25 mg Oral Daily    losartan  25 mg Oral Daily    clopidogrel  75 mg Oral Daily    sodium chloride flush  5-40 mL Intravenous 2 times per day    famotidine (PEPCID) injection  20 mg Intravenous Daily    aspirin  81 mg Oral Daily    atorvastatin  40 mg Oral Nightly     LORazepam, sodium chloride flush, sodium chloride, promethazine **OR** ondansetron, acetaminophen **OR** acetaminophen, polyethylene glycol, nitroGLYCERIN  Diet NPO Effective Now       Labs:   CBC with DIFF:  Recent Labs     04/10/21  1258 04/13/21  0238   WBC 13.2* 9.2   RBC 5.09 5.67*   HGB 14.6 15.9   HCT 44.4 49.2*   MCV 87.2 86.8   MCH 28.7 28.0   MCHC 32.9* 32.3*   RDW 13.2 12.7    231   MPV 9.2* 9.3*   NEUTOPHILPCT 82.3* 77.5*   LYMPHOPCT 8.9* 11.3*   MONOPCT 7.8 9.6   EOSRELPCT 0.1 0.7   BASOPCT 0.4 0.4   NEUTROABS 10.9* 7.1   LYMPHSABS 1.2 1.0*   MONOSABS 1.00* 0.90   EOSABS 0.00 0.10   BASOSABS 0.10 0.00       CMP/BMP:  Recent Labs     04/10/21  1258 04/13/21  0238    136   K 4.8 4.9    96*   CO2 24 27   ANIONGAP 12 13   GLUCOSE 90 96   BUN 20 14   CREATININE 0.6 0.5   LABGLOM >60 >60   CALCIUM 9.3 9.8*   PROT 6.3*  --    LABALBU 3.8  --    BILITOT 1.0  --    ALKPHOS 75  --    ALT 27  --    AST 89*  --          CRP:  No results for input(s): CRP in the last 72 hours. Sed Rate:  No results for input(s): SEDRATE in the last 72 hours. HgBA1c:  No components found for: HGBA1C  FLP:  No results found for: TRIG, HDL, LDLCALC, LDLDIRECT, LABVLDL  TSH:  No results found for: TSH  Troponin T:   Recent Labs     04/10/21  1503 04/10/21  1727 04/10/21  2051   TROPONINI 0.20* 0.24* 0.18*     Pro-BNP: No results for input(s): BNP in the last 72 hours.   INR:   Recent Labs     04/10/21  1258   INR 1.02     ABGs: No results found for: PHART, PO2ART, XVS8HVD  UA:  Recent Labs     04/10/21  1215   COLORU YELLOW   PHUR 5.0   WBCUA 3   RBCUA 7*   BACTERIA NEGATIVE*   CLARITYU Clear   SPECGRAV 1.026   LEUKOCYTESUR Negative   UROBILINOGEN 1.0   BILIRUBINUR Negative   BLOODU Negative   GLUCOSEU Negative         Culture Results:    No results for input(s): CXSURG in the last 720 hours. Blood Culture Recent: No results for input(s): BC in the last 720 hours. Cultures:   No results for input(s): CULTURE in the last 72 hours. No results for input(s): BC, Vaishali Radisson in the last 72 hours. No results for input(s): CXSURG in the last 72 hours. No results for input(s): MG, PHOS in the last 72 hours. Recent Labs     04/10/21  1258   AST 89*   ALT 27   BILITOT 1.0   ALKPHOS 75         RAD:   Xr Elbow Left (min 3 Views)    Result Date: 4/10/2021  Exam:   XR ELBOW LEFT (MIN 3 VIEWS)  Date:  4/10/2021 History:  Female, age  80 years; fall COMPARISON:  None. Findings : Irregularity at the radial head, concerning for fracture of uncertain chronicity. Impression: Findings concerning for radial head fracture of uncertain chronicity. Signed by Dr Vi Mcclain on 4/10/2021 2:11 PM    Ct Head Wo Contrast    Result Date: 4/10/2021  EXAM: CT OF THE HEAD WITHOUT IV CONTRAST 4/10/2021 COMPARISON: Head CT dated Kendra 15, 2018. INDICATION: Female, 80years-old. History of stroke , fall, syncope PROCEDURE: Non contrast enhanced head CT was performed. Radiation dose equals DLP 1193 mGy-cm. Automated exposure control dose reduction technique was implemented. FINDINGS: Mild generalized cerebral and loss. Mild chronic microvascular changes. . There is no evidence of mass-effect or midline shift. The gray-white differentiation is preserved. There is no evidence of intracranial contusion, hemorrhage, or skull fracture. The visualized portions of the paranasal sinuses are free of obstructive mucosal disease. The visualized portions of the mastoid air cells are clear. There is a large mucous retention cysts in the left maxillary sinus. 1.   No acute intracranial abnormality.  Signed by Dr Vi Mcclain on 4/10/2021 1:09 PM    Ct Cervical Spine Wo Contrast    Result Date: 4/10/2021  EXAMINATION: CT CERVICAL SPINE WITHOUT IV CONTRAST 4/10/2021 COMPARISON: CT cervical spine dated June 25, 2018. INDICATION: Female, 80years-old. Fall PROCEDURE: Multiple CT images of the cervical spine were obtained without IV contrast. Images were formatted in the axial, coronal and sagittal planes. FINDINGS: The odontoid process is well approximated with the anterior body of C1 and well aligned between the lateral masses of C1. Grade 1 anterolisthesis of C4 on C5 and C5 on C6. There is no acute compression deformity. No dislocation. Multilevel degenerative changes, with disc disease and facet arthropathy. Visualized posterior fossa demonstrates no acute findings. There is no paraspinal hematoma. 1.  No acute osseous posttraumatic findings. Signed by Dr Shawn Caban on 4/10/2021 1:14 PM    Ct Pelvis Wo Contrast Additional Contrast? None    Result Date: 4/10/2021  Exam:   CT PELVIS WO CONTRAST  Date:  4/10/2021 History:  Female, age  80 years; fall COMPARISON:  None. Radiation dose equals DLP 1085 mGy-cm. Automated exposure control dose reduction technique was implemented. Findings : There is no acute displaced fracture. No dislocation. No suspicious lytic or blastic lesion. No radiopaque foreign body. Impression: No acute osseous pathology. Signed by Dr Shawn Caban on 4/10/2021 1:17 PM    Xr Chest Portable    Result Date: 4/10/2021  Exam:   XR CHEST PORTABLE  Date:  4/10/2021 History:  Female, age  80 years; altered mental status. COMPARISON:  None. Findings : The heart and mediastinum are normal in size. Lungs are without focal infiltrate, mass or effusions. Calcified adenoma in the right midlung zone. Chronic interstitial lung changes. The bones show no acute pathology. Impression: No acute cardiopulmonary disease.  Signed by Dr Shawn Caban on 4/10/2021 2:09 PM      Objective:   Vitals:   BP (!) 180/86   Pulse 83   Temp 96.9 °F (36.1 °C) (Temporal)   Resp 19   Ht 5' (1.524 m)   Wt 105 lb 0.2 oz (47.6 kg)   SpO2 94%   BMI 20.51 kg/m²       Patient Vitals for the past 24 normal. There is no distension. Palpations: Abdomen is soft. Tenderness: There is no abdominal tenderness. Musculoskeletal: Normal range of motion. General: No swelling, tenderness, deformity or signs of injury. Right lower leg: No edema. Left lower leg: No edema. Skin:     General: Skin is warm and dry. Capillary Refill: Capillary refill takes less than 2 seconds. Coloration: Skin is not jaundiced or pale. Findings: No bruising, erythema, lesion or rash. Neurological:      General: No focal deficit present. Mental Status: She is alert. She is disoriented. Cranial Nerves: No cranial nerve deficit. Sensory: No sensory deficit. Motor: No weakness. Coordination: Coordination normal.   Psychiatric:         Mood and Affect: Mood normal.         Behavior: Behavior normal.       Assessment/plan:         Hospital Problems           Last Modified POA    * (Principal) NSTEMI (non-ST elevated myocardial infarction) ([address] 4/10/2021 Yes    Closed nondisplaced fracture of head of left radius 4/10/2021 Yes    Non-traumatic rhabdomyolysis 4/10/2021 Yes    Fall 4/10/2021 Yes    Dementia associated with other underlying disease without behavioral disturbance ([address] 4/10/2021 Yes    Severe malnutrition ([address] 4/12/2021 Yes          Principal Problem:    NSTEMI (non-ST elevated myocardial infarction) ([address]  Active Problems:    Closed nondisplaced fracture of head of left radius    Non-traumatic rhabdomyolysis    Fall    Dementia associated with other underlying disease without behavioral disturbance ([address]    Severe malnutrition ([address]  Resolved Problems:    * No resolved hospital problems. *        Brief Summary  Ms. Maribel Parry, a 80 y.o. female with a history of HTN, HLD, presenting to [address] [place] [address] ED (04/10/2021), on account of an acute moderate-severe left elbow pain, from reported mechanical fall.   Patient reported falling onto the pavement while she was checking on a chicken of HTN  · Continue home regimen  · Losartan 25 mg p.o. daily  · Hydrochlorothiazide 25 mg p.o. daily  · Metoprolol succinate 25 mg p.o. daily     History of dementia, with intermittent agitation  · Donepezil 5 mg p.o. nightly              Continue management of other chronic medical conditions - see above and orders. Advance Directive: DNR-CC    Diet NPO Effective Now         Consults Made:   IP CONSULT TO CARDIOLOGY  IP CONSULT TO ORTHOPEDIC SURGERY  IP CONSULT TO SOCIAL WORK  IP CONSULT TO PALLIATIVE CARE  IP CONSULT TO HOSPICE    DVT prophylaxis: Enoxaparin      Discharge planning:   Patient's family/sister/POA reportedly does not want any extensive testing/work-up/procedures. Patient seen by hospice team.  Pending home hospice equipment set up for discharge, however given patient's family were unable to find a caretaker. Referral was placed to SNF  Patient has been accepted for possible discharge to SNF tomorrow (04/14/2021). Time Spent is 25 mins in the examination, evaluation, counseling and review of medications, assessment and plan.      Electronically signed by   Trell Kaufman MD, MPH,   Internal Medicine Hospitalist   4/13/2021 9:16 AM

## 2021-04-13 NOTE — CARE COORDINATION
[facility] has offered services for the pt and awaiting acceptance and approval to admit to the facility today 4/13.     [facility] [address] [facility]: [phone] T:[phone]

## 2021-04-13 NOTE — CARE COORDINATION
Confirmed with Odalis Capone in admissions at [facility] she received the pt's referral and is awaiting the facility morning meeting to determine if can acommodate the pt's sister's request for private room only.      [address] [place], [place] [address] [phone]

## 2021-04-13 NOTE — PROGRESS NOTES
Speech Language Pathology  Facility/Department: [facility] PROGRESSIVE CARE   CLINICAL BEDSIDE SWALLOW EVALUATION    NAME: Aubrey Sarabia  : 3/25/1929  MRN: [id number]    ADMISSION DATE: 4/10/2021  ADMITTING DIAGNOSIS: has Hyperlipidemia; Arthritis; Cerebrovascular accident (CVA) ([address] Age-related osteoporosis without current pathological fracture; Lumbar disc disease; Dupuytren's contracture of both hands; Collagenous colitis; Primary osteoarthritis of knee; Essential hypertension; Venous insufficiency; Dementia associated with other underlying disease without behavioral disturbance ([address] Sensorineural hearing loss; Vitamin D deficiency; Primary insomnia; Venous stasis dermatitis of both lower extremities; Localized edema; Pseudophakia; Unsteady gait; NSTEMI (non-ST elevated myocardial infarction) ([address] Fall; Closed nondisplaced fracture of head of left radius; Non-traumatic rhabdomyolysis; and Severe malnutrition ([address] on their problem list.    Date of Eval: 2021  Evaluating Therapist: Guadlupe Kayser    Current Diet level:  NPO    Reason for Referral  Aubrey Sarabia was referred for a bedside swallow evaluation to assess the efficiency of her swallow function, identify signs and symptoms of aspiration and make recommendations regarding safe dietary consistencies, effective compensatory strategies, and safe eating environment. Impression  Assessed patient's swallowing function. Patient exhibits decreased oral prep of more solid consistencies, fast oral transit and suspected swallow delay with thin liquid consistencies, and sluggish, mild-moderately decreased laryngeal elevation for swallow airway protection. It is noted that inconsistent delayed coughs were observed with every consistency presented during evaluation this date (ice chips;puree;nectar - cup;thin - cup). Do question residue stacking in the throat post swallows as well as esophageal dysmotility.     At this time, if PO intake is pursued, would trial puree consistency and mildly thick/nectar thick liquids. Recommend meds crushed in pudding/applesauce. If patient receives mouth care prior to intake, okay for ice chips IN BETWEEN MEALS for comfort. Thank you for this consult. Recommended Diet and Intervention  Diet Solids Recommendation: Puree  Liquid Consistency Recommendation: Mildly thick (Nectar thick)   Recommended Form of Meds: Meds crushed in puree as able     Compensatory Swallowing Strategies  Compensatory Swallowing Strategies: Upright as possible for all oral intake;Small bites/sips;Eat/Feed slowly; Alternate solids and liquids; Remain upright for 30-45 minutes after meals     General  Chart Reviewed: Yes  Behavior/Cognition: Alert;Confused  O2 Device: None (Room air)  Communication Observation: (Patient exhibits decreased volume of speech, decreased labial movements, and slowed, decreased lingual movements during verbalizations.)  Patient Positioning: Upright in bed  Consistencies Administered: Ice chips;Dysphagia Pureed (Dysphagia I); Nectar - cup; Thin - cup     Assessed patient's swallowing function with the following observations noted:     Oral Phase  Mastication: Ice chips (Patient exhibited decreased vertical jaw movement at the front of the mouth during oral prep of ice chip trials presented by SLP.)  Suspected Premature Bolus Loss: Thin - cup (Patient exhibited fast oral transit of thin H2O trials presented via cup by SLP.)  Oral Phase - Comment: Oral transit of ice chip trials primarily measured 1-2 seconds in length. Oral transit of puree consistency trials, presented by SLP, primarily measured 1-2 seconds in length and min oral cavity residue was noted post swallows; residue cleared from the mouth with additional dry swallows. Oral transit of nectar thick liquid trials, presented via cup by SLP, primarily measured 1-2 seconds in length. Pharyngeal Phase  Suspected Swallow Delay:  Thin - cup (Suspect secondary to oral transit times.)  Laryngeal Elevation: (Patient exhibited sluggish, mild-moderately decreased laryngeal elevation for swallow airway protection.)  Delayed Cough: All   Pharyngeal Phase - Comment: It is noted that inconsistent delayed coughs were observed with every consistency presented during assessment this date (ice chips;puree;nectar - cup;thin - cup). Do question residue stacking in the throat post swallows as well as esophageal dysmotility. At this time, if PO intake is pursued, would trial puree consistency and mildly thick/nectar thick liquids. Recommend meds crushed in pudding/applesauce. If patient receives mouth care prior to intake, okay for ice chips IN BETWEEN MEALS for comfort.     Electronically signed by CIRO Barksdale on 4/13/2021 at 12:40 PM

## 2021-04-13 NOTE — PROGRESS NOTES
Attempted to administer morning meds w/ applesauce. Pt had difficulty  Swallowing, risk for aspiration. AM meds held.  Awaiting speech eval. Electronically signed by Nadege Acosta RN on 4/13/2021 at 10:38 AM

## 2021-04-14 VITALS
HEART RATE: 71 BPM | DIASTOLIC BLOOD PRESSURE: 70 MMHG | TEMPERATURE: 97.7 F | OXYGEN SATURATION: 93 % | BODY MASS INDEX: 20.03 KG/M2 | WEIGHT: 102.03 LBS | SYSTOLIC BLOOD PRESSURE: 122 MMHG | HEIGHT: 60 IN | RESPIRATION RATE: 18 BRPM

## 2021-04-14 LAB
ANION GAP SERPL CALCULATED.3IONS-SCNC: 12 MMOL/L (ref 7–19)
BASOPHILS ABSOLUTE: 0.1 K/UL (ref 0–0.2)
BASOPHILS RELATIVE PERCENT: 0.6 % (ref 0–1)
BUN BLDV-MCNC: 32 MG/DL (ref 8–23)
CALCIUM SERPL-MCNC: 9.4 MG/DL (ref 8.2–9.6)
CHLORIDE BLD-SCNC: 99 MMOL/L (ref 98–111)
CO2: 26 MMOL/L (ref 22–29)
CREAT SERPL-MCNC: 1 MG/DL (ref 0.5–0.9)
EOSINOPHILS ABSOLUTE: 0.1 K/UL (ref 0–0.6)
EOSINOPHILS RELATIVE PERCENT: 1.2 % (ref 0–5)
GFR AFRICAN AMERICAN: >59
GFR NON-AFRICAN AMERICAN: 52
GLUCOSE BLD-MCNC: 77 MG/DL (ref 74–109)
HCT VFR BLD CALC: 48 % (ref 37–47)
HEMOGLOBIN: 15.5 G/DL (ref 12–16)
IMMATURE GRANULOCYTES #: 0.1 K/UL
LYMPHOCYTES ABSOLUTE: 1.5 K/UL (ref 1.1–4.5)
LYMPHOCYTES RELATIVE PERCENT: 18.2 % (ref 20–40)
MCH RBC QN AUTO: 28.5 PG (ref 27–31)
MCHC RBC AUTO-ENTMCNC: 32.3 G/DL (ref 33–37)
MCV RBC AUTO: 88.4 FL (ref 81–99)
MONOCYTES ABSOLUTE: 1.1 K/UL (ref 0–0.9)
MONOCYTES RELATIVE PERCENT: 12.8 % (ref 0–10)
NEUTROPHILS ABSOLUTE: 5.4 K/UL (ref 1.5–7.5)
NEUTROPHILS RELATIVE PERCENT: 66 % (ref 50–65)
PDW BLD-RTO: 13.1 % (ref 11.5–14.5)
PLATELET # BLD: 254 K/UL (ref 130–400)
PMV BLD AUTO: 9.7 FL (ref 9.4–12.3)
POTASSIUM REFLEX MAGNESIUM: 4.8 MMOL/L (ref 3.5–5)
RBC # BLD: 5.43 M/UL (ref 4.2–5.4)
SODIUM BLD-SCNC: 137 MMOL/L (ref 136–145)
WBC # BLD: 8.2 K/UL (ref 4.8–10.8)

## 2021-04-14 PROCEDURE — 80048 BASIC METABOLIC PNL TOTAL CA: CPT

## 2021-04-14 PROCEDURE — 2580000003 HC RX 258: Performed by: INTERNAL MEDICINE

## 2021-04-14 PROCEDURE — 6370000000 HC RX 637 (ALT 250 FOR IP): Performed by: INTERNAL MEDICINE

## 2021-04-14 PROCEDURE — 85025 COMPLETE CBC W/AUTO DIFF WBC: CPT

## 2021-04-14 PROCEDURE — 6360000002 HC RX W HCPCS: Performed by: INTERNAL MEDICINE

## 2021-04-14 PROCEDURE — 92526 ORAL FUNCTION THERAPY: CPT

## 2021-04-14 PROCEDURE — 36415 COLL VENOUS BLD VENIPUNCTURE: CPT

## 2021-04-14 PROCEDURE — 2500000003 HC RX 250 WO HCPCS: Performed by: INTERNAL MEDICINE

## 2021-04-14 RX ORDER — LORAZEPAM 0.5 MG/1
0.5 TABLET ORAL
Qty: 9 TABLET | Refills: 0 | Status: SHIPPED | OUTPATIENT
Start: 2021-04-14 | End: 2021-04-17

## 2021-04-14 RX ADMIN — HYDROCHLOROTHIAZIDE 25 MG: 25 TABLET ORAL at 09:32

## 2021-04-14 RX ADMIN — FAMOTIDINE 20 MG: 10 INJECTION, SOLUTION INTRAVENOUS at 09:24

## 2021-04-14 RX ADMIN — SODIUM CHLORIDE, PRESERVATIVE FREE 10 ML: 5 INJECTION INTRAVENOUS at 09:34

## 2021-04-14 RX ADMIN — CLOPIDOGREL BISULFATE 75 MG: 75 TABLET ORAL at 09:29

## 2021-04-14 RX ADMIN — ASPIRIN 81 MG: 81 TABLET, CHEWABLE ORAL at 09:32

## 2021-04-14 RX ADMIN — METOPROLOL SUCCINATE 25 MG: 25 TABLET, EXTENDED RELEASE ORAL at 09:32

## 2021-04-14 RX ADMIN — ENOXAPARIN SODIUM 30 MG: 30 INJECTION SUBCUTANEOUS at 09:24

## 2021-04-14 RX ADMIN — LOSARTAN POTASSIUM 25 MG: 25 TABLET, FILM COATED ORAL at 09:32

## 2021-04-14 ASSESSMENT — PAIN SCALES - GENERAL: PAINLEVEL_OUTOF10: 0

## 2021-04-14 NOTE — DISCHARGE SUMMARY
Jasmyn Arroyo  :  3/25/1929  MRN:  [id number]    Admit date:  4/10/2021  Discharge date:  2021       Admitting Physician:  Jersey Medrano MD    Advance Directive: DNR-CC    Consults Made:   IP CONSULT TO CARDIOLOGY  IP CONSULT TO ORTHOPEDIC SURGERY  IP CONSULT TO SOCIAL WORK  IP CONSULT TO PALLIATIVE CARE  IP CONSULT TO HOSPICE      Primary Care Physician:  Severiano Hallman MD    Discharge Diagnoses:  Principal Problem:    NSTEMI (non-ST elevated myocardial infarction) [facility])  Active Problems:    Closed nondisplaced fracture of head of left radius    Non-traumatic rhabdomyolysis    Fall    Dementia associated with other underlying disease without behavioral disturbance ([facility]    Severe malnutrition ([facility]  Resolved Problems:    * No resolved hospital problems. *            Pertinent Labs:  CBC with DIFF:  Recent Labs     21   WBC 9.2 8.2   RBC 5.67* 5.43*   HGB 15.9 15.5   HCT 49.2* 48.0*   MCV 86.8 88.4   MCH 28.0 28.5   MCHC 32.3* 32.3*   RDW 12.7 13.1    254   MPV 9.3* 9.7   NEUTOPHILPCT 77.5* 66.0*   LYMPHOPCT 11.3* 18.2*   MONOPCT 9.6 12.8*   EOSRELPCT 0.7 1.2   BASOPCT 0.4 0.6   NEUTROABS 7.1 5.4   LYMPHSABS 1.0* 1.5   MONOSABS 0.90 1.10*   EOSABS 0.10 0.10   BASOSABS 0.00 0.10       CMP/BMP:  Recent Labs     21    137   K 4.9 4.8   CL 96* 99   CO2 27 26   ANIONGAP 13 12   GLUCOSE 96 77   BUN 14 32*   CREATININE 0.5 1.0*   LABGLOM >60 52*   CALCIUM 9.8* 9.4         CRP:  No results for input(s): CRP in the last 72 hours. Sed Rate:  No results for input(s): SEDRATE in the last 72 hours. HgBA1c:  No components found for: HGBA1C  FLP:  No results found for: TRIG, HDL, LDLCALC, LDLDIRECT, LABVLDL  TSH:  No results found for: TSH  Troponin T:   No results for input(s): TROPONINI in the last 72 hours. Pro-BNP: No results for input(s): BNP in the last 72 hours. INR:   No results for input(s): INR in the last 72 hours.   ABGs: No results found for: PHART, PO2ART, XMI0OAF  UA:  No results for input(s): NITRITE, COLORU, PHUR, LABCAST, WBCUA, RBCUA, MUCUS, TRICHOMONAS, YEAST, BACTERIA, CLARITYU, SPECGRAV, LEUKOCYTESUR, UROBILINOGEN, BILIRUBINUR, BLOODU, GLUCOSEU, AMORPHOUS in the last 72 hours. Invalid input(s): Daniella Osullivan      Culture Results:    No results for input(s): CXSURG in the last 720 hours. Blood Culture Recent: No results for input(s): BC in the last 720 hours. Cultures:   No results for input(s): CULTURE in the last 72 hours. No results for input(s): BC, Manasa Hock in the last 72 hours. No results for input(s): CXSURG in the last 72 hours. No results for input(s): MG, PHOS in the last 72 hours. No results for input(s): AST, ALT, ALB, BILITOT, ALKPHOS, ALB in the last 72 hours. Significant Diagnostic Studies:   Xr Elbow Left (min 3 Views)    Result Date: 4/10/2021  Exam:   XR ELBOW LEFT (MIN 3 VIEWS)  Date:  4/10/2021 History:  Female, age  80 years; fall COMPARISON:  None. Findings : Irregularity at the radial head, concerning for fracture of uncertain chronicity. Impression: Findings concerning for radial head fracture of uncertain chronicity. Signed by Dr Yonatan Dye on 4/10/2021 2:11 PM    Ct Head Wo Contrast    Result Date: 4/10/2021  EXAM: CT OF THE HEAD WITHOUT IV CONTRAST 4/10/2021 COMPARISON: Head CT dated Kendra 15, 2018. INDICATION: Female, 80years-old. History of stroke , fall, syncope PROCEDURE: Non contrast enhanced head CT was performed. Radiation dose equals DLP 1193 mGy-cm. Automated exposure control dose reduction technique was implemented. FINDINGS: Mild generalized cerebral and loss. Mild chronic microvascular changes. . There is no evidence of mass-effect or midline shift. The gray-white differentiation is preserved. There is no evidence of intracranial contusion, hemorrhage, or skull fracture. The visualized portions of the paranasal sinuses are free of obstructive mucosal disease.  The visualized portions of the mastoid air cells are clear. There is a large mucous retention cysts in the left maxillary sinus. 1.   No acute intracranial abnormality. Signed by Dr Hal Rocha on 4/10/2021 1:09 PM    Ct Cervical Spine Wo Contrast    Result Date: 4/10/2021  EXAMINATION: CT CERVICAL SPINE WITHOUT IV CONTRAST 4/10/2021 COMPARISON: CT cervical spine dated June 25, 2018. INDICATION: Female, 80years-old. Fall PROCEDURE: Multiple CT images of the cervical spine were obtained without IV contrast. Images were formatted in the axial, coronal and sagittal planes. FINDINGS: The odontoid process is well approximated with the anterior body of C1 and well aligned between the lateral masses of C1. Grade 1 anterolisthesis of C4 on C5 and C5 on C6. There is no acute compression deformity. No dislocation. Multilevel degenerative changes, with disc disease and facet arthropathy. Visualized posterior fossa demonstrates no acute findings. There is no paraspinal hematoma. 1.  No acute osseous posttraumatic findings. Signed by Dr Hal Rocha on 4/10/2021 1:14 PM    Ct Pelvis Wo Contrast Additional Contrast? None    Result Date: 4/10/2021  Exam:   CT PELVIS WO CONTRAST  Date:  4/10/2021 History:  Female, age  80 years; fall COMPARISON:  None. Radiation dose equals DLP 1085 mGy-cm. Automated exposure control dose reduction technique was implemented. Findings : There is no acute displaced fracture. No dislocation. No suspicious lytic or blastic lesion. No radiopaque foreign body. Impression: No acute osseous pathology. Signed by Dr Hal Rocha on 4/10/2021 1:17 PM    Xr Chest Portable    Result Date: 4/10/2021  Exam:   XR CHEST PORTABLE  Date:  4/10/2021 History:  Female, age  80 years; altered mental status. COMPARISON:  None. Findings : The heart and mediastinum are normal in size. Lungs are without focal infiltrate, mass or effusions. Calcified adenoma in the right midlung zone.  Chronic interstitial lung changes. The bones show no acute pathology. Impression: No acute cardiopulmonary disease. Signed by Dr Pinky Vicente on 4/10/2021 2:09 PM      Hospital Course:   Ms. Johana Farris y. o. female with a history of HTN, HLD, presenting to [facility] ED (04/10/2021), on account of an acute moderate-severe left elbow pain, from reported mechanical fall.  Patient reported falling onto the pavement while she was checking on a chicken coop alarm.  Reportedly crawled/pulled herself inside.  Patient was reportedly found by her sister at the next day, having laid on the floor all night, and given patient lives by self.     Initial work-up in the ED significant for;  CT head, cervical spine, pelvis, as well as chest x-ray, with no acute findings. X-ray left elbow, concerning for radial head fracture of uncertain chronicity   Elevated CK level: 1,339   Elevated troponin: 0.34  Elevated proBNP: 8,177    Cardiology and Orthopedist consulted from ER. Patient admitted for further work-up and management        NSTEMI  Elevated proBNP  · Initial ProBNP: 8,177  · Initial troponin on 0.34 --> 0.20 --> 0.24 --> 0.18 (04/10/2021)  · Serial EKGs for chest pain  · Optimized medical management  · Continued to monitor on telemetry  · Famotidine   · Cardiology consulted from ED  · Patient's family/sister/POA reportedly does not want any extensive testing/work-up/procedures. · Heparin ggt  initially started on admission.,  However now discontinued  · 2D echocardiogram initially ordered, now discontinued  · Seen by Palliative/hospice care consulted, for discussion of goals of care. · Home with hospice.     Fall  Closed nondisplaced fracture of head of left radius  · CT head without contrast with no acute intracranial pathology  · CT cervical spine without contrast - No acute osseous posttraumatic findings  · CT pelvis without contrast - No acute osseous pathology  · Chest x-ray: No acute cardiopulmonary disease.   · X-ray left elbow: furosemide (LASIX) 20 MG tablet  Take 1 tablet by mouth daily as needed (edema)             hydroCHLOROthiazide (HYDRODIURIL) 25 MG tablet  Take 1 tablet by mouth daily             LORazepam (ATIVAN) 0.5 MG tablet  Take 1 tablet by mouth every 6 hours while awake for 3 days. losartan (COZAAR) 25 MG tablet  Take 1 tablet by mouth daily             metoprolol succinate (TOPROL XL) 25 MG extended release tablet  TAKE 1 TABLET EVERY DAY FOR BLOOD PRESSURE             Multiple Vitamins-Minerals (PRESERVISION AREDS 2 PO)  Take by mouth daily             vitamin D (ERGOCALCIFEROL) 81731 units CAPS capsule  TAKE ONE CAPSULE BY MOUTH ONCE WEEKLY                 Discharge Instructions: Follow up with Kyler Harris MD in 7 days. Take medications as directed. Resume activity as tolerated. Diet: DIET DYSPHAGIA PUREED; Mildly Thick (Nectar)        DISCHARGE STATUS:    Condition: Fair  Disposition: Patient is medically stable and will be discharged to SNF for Palliative Care    Extended Emergency Contact Information  Primary Emergency Contact: [address] Phone: [phone]  Relation: Brother/Sister       Time Spent on discharge is 36 mins in the examination, evaluation, counseling and review of medications and discharge plan. Electronically signed by   Akosua Arango MD, MPH,   Internal Medicine Hospitalist   4/14/2021 10:04 AM      Thank you Kyler Harris MD for the opportunity to be involved in this patient's care. If you have any questions or concerns please feel free to contact me at [phone]        EMR Dragon/Transcription disclaimer:   Much of this encounter note is an electronic transcription/translation of spoken language to printed text.  The electronic translation of spoken language may permit erroneous, or at times, nonsensical words or phrases to be inadvertently transcribed; although attempts have made to review the note for such errors, some may still exist.

## 2021-04-14 NOTE — PROGRESS NOTES
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    Date of Treat: 2021  Evaluating Therapist: Sandie Kong    Current Diet level:  Puree consistency with mildly thick/nectar thick liquids    Reason for Referral  Maureen Armas was referred for a bedside swallow evaluation to assess the efficiency of her swallow function, identify signs and symptoms of aspiration and make recommendations regarding safe dietary consistencies, effective compensatory strategies, and safe eating environment. Impression  Monitored patient's swallowing function. Patient exhibits sluggish, mild-moderately decreased laryngeal elevation for swallow airway protection. No outward S/S penetration/aspiration was noted with any puree consistency presentation or mildly thick/nectar thick liquid presentation administered during treatment session this date. Do however question residue stacking in the throat post swallows as well as esophageal dysmotility (patient produced multiple swallows with each bite/drink).      At this time, if PO intake is pursued, would recommend continuation puree consistency and mildly thick/nectar thick liquids. TOTAL FEED. Recommend meds crushed in pudding/applesauce. If patient receives mouth care prior to intake, okay for ice chips IN BETWEEN MEALS for comfort.     Recommended Diet and Intervention  Diet Solids Recommendation: Puree  Liquid Consistency Recommendation: Mildly thick (Nectar thick)   Recommended Form of Meds: Meds crushed in puree as able     Compensatory Swallowing Strategies  Compensatory Swallowing Strategies: Upright as possible for all oral intake;Small bites/sips;Eat/Feed slowly; Alternate solids and liquids; Remain upright for 30-45 minutes after meals     General  Chart Reviewed: Yes  Behavior/Cognition: Alert;Confused  O2 Device: None (Room air)  Communication Observation: (Patient exhibits decreased volume of speech, decreased labial movements, and slowed, decreased lingual movements during verbalizations.)  Patient Positioning: Upright in bed  Consistencies Administered: Dysphagia Pureed (Dysphagia I); Nectar - straw     Monitored patient's swallowing function with the following observations noted:     Oral Phase  Oral Phase - Comment: Oral transit of puree consistency presentations, administered by support group member, primarily measured 1-2 seconds in length and min oral cavity residue was noted post swallows; residue cleared from the mouth with additional dry swallows. Oral transit of nectar thick liquid presentations, administered via straw by support group member, primarily measured 1-2 seconds in length.      Pharyngeal Phase  Laryngeal Elevation: (Patient exhibited sluggish, mild-moderately decreased laryngeal elevation for swallow airway protection.)  Pharyngeal Phase - Comment: No outward S/S penetration/aspiration was noted with any puree consistency presentation or mildly thick/nectar thick liquid presentation administered during treatment session.  Do however question residue stacking in the throat post swallows as well as esophageal dysmotility (patient produced multiple swallows with each bite/drink).    At this time, if PO intake is pursued, would recommend continuation puree consistency and mildly thick/nectar thick liquids. TOTAL FEED. Recommend meds crushed in pudding/applesauce. If patient receives mouth care prior to intake, okay for ice chips IN BETWEEN MEALS for comfort.     Electronically signed by CIRO Heath on 4/14/2021 at 1:22 PM

## 2021-04-14 NOTE — PROGRESS NOTES
Pharmacy Renal Adjustment    Ricky Winter is a 80 y.o. female. Pharmacy has renally adjusted medications per protocol. Recent Labs     04/13/21  0238 04/14/21  0139   BUN 14 32*       Recent Labs     04/13/21  0238 04/14/21  0139   CREATININE 0.5 1.0*       Estimated Creatinine Clearance: 26 mL/min (A) (based on SCr of 1 mg/dL (H)).     Height:   Ht Readings from Last 1 Encounters:   04/12/21 5' (1.524 m)     Weight:  Wt Readings from Last 1 Encounters:   04/13/21 105 lb 0.2 oz (47.6 kg)     Plan: Adjust the following medications based on renal function:           Lovenox 40 mg SQ daily to 30 mg SQ daily    Electronically signed by Minoo Alcantara, [facility] on 4/14/2021 at 5:49 AM

## 2021-04-14 NOTE — CARE COORDINATION
[facility] with [facility] reports able to admit the pt today. SW notified attending Dr Adrian Fernandez the pt will require ativan as scheduled for SNF because they cannot administer controlled meds as PRN. Attending agreed.        [facility]  Ph: [phone]  Fax: [phone]

## 2021-04-14 NOTE — PROGRESS NOTES
Report called to PHONG Magallanes @ [facility], discharge summary and med list sent to [phone]. Pt will be transferred by ambulance.

## 2021-04-15 ENCOUNTER — TELEPHONE (OUTPATIENT)
Dept: INTERNAL MEDICINE | Age: 86
End: 2021-04-15

## 2021-04-15 NOTE — TELEPHONE ENCOUNTER
SKILLED NURSING FACILITY:   INITIAL CALL POST-HOSPITAL DISCHARGE    S[facility]: [facility]     PHONE NUMBER: [phone]      / : Darrion Parker     THERAPY: PT/OT/ST     ANTICIPATED LENGTH OF STAY: unknown    Per Jose,  at [facility], Mrs. Kike Ruelas was admitted for short term rehab. She will be evaluated by physical and occupational therapy for a plan of care. There is no discharge plans at this time.

## 2021-04-20 ENCOUNTER — TELEPHONE (OUTPATIENT)
Dept: PALLATIVE CARE | Age: 86
End: 2021-04-20

## 2021-04-20 NOTE — TELEPHONE ENCOUNTER
Referral received from [facility] for Supportive Care. Placed a call to patient's sister/DEEPAK Bender and was unable to contact her. I will try again.

## 2021-04-21 ENCOUNTER — OFFICE VISIT (OUTPATIENT)
Dept: PALLATIVE CARE | Age: 86
End: 2021-04-21
Payer: MEDICARE

## 2021-04-21 DIAGNOSIS — Z51.5 ENCOUNTER FOR PALLIATIVE CARE: ICD-10-CM

## 2021-04-21 DIAGNOSIS — I21.4 NSTEMI (NON-ST ELEVATED MYOCARDIAL INFARCTION) (HCC): ICD-10-CM

## 2021-04-21 DIAGNOSIS — F02.80 DEMENTIA ASSOCIATED WITH OTHER UNDERLYING DISEASE WITHOUT BEHAVIORAL DISTURBANCE (HCC): ICD-10-CM

## 2021-04-21 DIAGNOSIS — R45.1 RESTLESSNESS AND AGITATION: Primary | ICD-10-CM

## 2021-04-21 PROCEDURE — 1123F ACP DISCUSS/DSCN MKR DOCD: CPT | Performed by: NURSE PRACTITIONER

## 2021-04-21 PROCEDURE — 99306 1ST NF CARE HIGH MDM 50: CPT | Performed by: NURSE PRACTITIONER

## 2021-04-22 NOTE — PROGRESS NOTES
[facility]  Initial Consultation Note      Patient Name:  Lavada Felty  Medical Record Number:  [id number]  YOB: 1929    Date of Visit: 4/21/2021  Location of Visit:  []  Home    [x]  Other:  [facility] [facility] (patient admitted to facility 4/14/2021)  Referring Provider:  Primary Care Provider: Carl Marcelo MD  Additional Care Team Members:   Dr. Palma Keller Atttending  Reason for Consult:  [x]  Goals of care     [x]  Symptom Management    [x]  ACP   [x]  Family Support      History obtained from:  patient, family member - sister, non-family caregiver - facility staff, electronic medical record    Ajith Waters:   Rudy Hassan was seen today for agitation. Diagnoses and all orders for this visit:    Restlessness and agitation    Dementia associated with other underlying disease without behavioral disturbance ([facility] [address]    NSTEMI (non-ST elevated myocardial infarction) ([facility] [address]    Encounter for palliative care      RECOMMENDATIONS/PLAN:     Increase Ativan to 0.5mg every four hours while awake. Consider constipation/pain as possible contributor to agitation and treat as needed. Consider antipsychotic if agitation/behaviors persist.  Recommend bed alarm, however staff reports she does not yet meet facility protocol for alarm. I will see her again in 1-2 weeks or sooner if needed. [phone]    CHIEF COMPLAINT:     Chief Complaint   Patient presents with    Agitation       CLINICAL SUMMARY:          Ms. Anneliese Curtis is a 80year old female with PMH of dementia, emphysema, HTN, hyperlipidemia, arthritis, BPV, osteoarthritis, CVA, collagenous colitis. Recently admitted to [facility] due to fall. CT head, cervical spine, pelvis and chest xray - no acute findings. She was seen by cardiology due to elevated troponin and was dx with NSTEMI and no further work up was recommended. Palliative care was suggested.   She was seen by orthopedics due to closed nondisplaced fracture of left radius and sling and outpatient follow up was recommended. Her sister and POA requested hospice at home. This was being arranged and equipment delivered to the home. She was unable to hire caregivers to provide 24 hour care. She was then admitted to [facility] and supportive care requested. HPI AND VISIT SUMMARY:     I saw Ms. Janny Mayo in her room at [facility]. I was screened and afebrile on entry to the facility. Her sister and POA Ashley Rojas was present and participated in the visit. I introduced myself and the role of supportive care. She indicates patient was living alone and mostly independent until recently. Staff reports she has bladder incontinence. She is on a pureed nectar thick diet and only consuming 20% or less. She has been trying to get out of bed frequently. She has been yelling out on occasion. She has been receiving Ativan every six hours which has been helping with her agitation. During my visit she had to be redirected several times to stay in bed. She was awake, new her sister, oriented to person, new her birthdate, and that she was \"in rehab. \"  She asked her sister who was feeding her cat. Her sister's goal for her care is comfort only. She does not want her to have any aggressive treatment or therapy. She did not want me checking her BP today. She does not want her to have any needle sticks or labs. She is still open to hospice care at the facility or at home if she has 24 hours caregivers to assist In care of patient.     ADVANCED CARE PLANNING:                                            Surrogate Decision Maker: yes  Darlene Pugh (sister and POA)    Durable Power of : yes - on file    Advanced Directives/Living [place]: yes - on file    Out of hospital medical orders in place to reflect resuscitation status: yes  DNR  CLINICAL PAIN ASSESSMENT:     FLACC (if non-verbal): 2     FUNCTIONAL ASSESSMENT:     Palliative Performance Scale: 40%    HISTORY: Past Medical History:        Diagnosis Date    Age-related osteoporosis without current pathological fracture 08/10/2017    Arthritis     BPV (benign positional vertigo)     Collagenous colitis 08/10/2017    Dupuytren's contracture of both hands     Ehrlichiosis     Hyperlipidemia     Hypertensive crisis     Lumbar disc disease 08/10/2017    Memory loss     Meralgia paraesthetica     Other emphysema ([address] 2017    Palliative care patient 2021    Primary osteoarthritis of knee 08/10/2017    Sensorineural hearing loss     Venous insufficiency     Venous stasis dermatitis of both lower extremities 2019    Vitamin D deficiency        Past Surgical History:        Procedure Laterality Date    COLONOSCOPY      KNEE SURGERY Bilateral     total knee arthroplasty 11/10    TOE AMPUTATION Right 2017    TOE AMPUTATION 2ND DIGIT performed by Tushar De Leon DPM at [facility] OR       Family History:       Problem Relation Age of Onset    Atrial Fibrillation Sister     Stroke Sister         ischemic    Breast Cancer Sister [de-identified]       Social History:    Social History     Tobacco Use    Smoking status: Former Smoker     Quit date: 1965     Years since quittin.3    Smokeless tobacco: Never Used   Substance Use Topics    Alcohol use: No    Drug use: No       Current Medications:      Current Outpatient Medications:     aspirin 81 MG chewable tablet, Take 1 tablet by mouth daily, Disp: 30 tablet, Rfl: 0    diclofenac sodium (VOLTAREN) 1 % GEL, Apply 4 g topically 4 times daily as needed for Pain, Disp: 150 g, Rfl: 3    donepezil (ARICEPT) 5 MG tablet, TAKE 1 TABLET EVERY NIGHT, Disp: 90 tablet, Rfl: 3    Apoaequorin (PREVAGEN) 10 MG CAPS, Take by mouth, Disp: , Rfl:     furosemide (LASIX) 20 MG tablet, Take 1 tablet by mouth daily as needed (edema), Disp: 30 tablet, Rfl: 1    losartan (COZAAR) 25 MG tablet, Take 1 tablet by mouth daily, Disp: 90 tablet, Rfl: 3    clopidogrel (PLAVIX) 75 MG tablet, Take 1 tablet by mouth daily, Disp: 90 tablet, Rfl: 3    atorvastatin (LIPITOR) 20 MG tablet, Take one pill daily, Disp: 90 tablet, Rfl: 3    alendronate (FOSAMAX) 70 MG tablet, TAKE 1 TABLET EVERY WEEK, Disp: 12 tablet, Rfl: 3    magnesium hydroxide (MILK OF MAGNESIA) 400 MG/5ML suspension, Take 30 mLs by mouth daily as needed for Constipation, Disp: , Rfl:     hydroCHLOROthiazide (HYDRODIURIL) 25 MG tablet, Take 1 tablet by mouth daily, Disp: 90 tablet, Rfl: 3    vitamin D (ERGOCALCIFEROL) 76600 units CAPS capsule, TAKE ONE CAPSULE BY MOUTH ONCE WEEKLY, Disp: 12 capsule, Rfl: 3    Multiple Vitamins-Minerals (PRESERVISION AREDS 2 PO), Take by mouth daily, Disp: , Rfl:     metoprolol succinate (TOPROL XL) 25 MG extended release tablet, TAKE 1 TABLET EVERY DAY FOR BLOOD PRESSURE, Disp: 90 tablet, Rfl: 3     Allergies: Other    Review of Systems   Unable to perform ROS: Other (dementia/mental status)       OBJECTIVE:     Vitals:    04/21/21 1518   Pulse: 60   Resp: 20   Temp: 97.9 °F (36.6 °C)   SpO2: 98%     General appearance: alert and cooperative with exam, vital signs stable, well developed, elderly female, appears chronically ill  HEENT: atraumatic, sclera clear  and normal lids,  external ears and nose are normal, lips normal.  Neck:  supple  Lungs: equal bilateral expansion, clear to auscultation bilaterally, no cough, no dyspnea  Heart: regular rate and rhythm, S1 S2 normal, no murmur  Abdomen:  soft, non-tender, bowel sounds active  Extremities:  no cyanosis, clubbing, or edema, pulses palpable  Neurologic: alert and oriented to person and place, cooperative with exam, answers some questions  Psychiatric: had to be redirected to stay in bed several times  Skin: warm, dry    LAB DATA REVIEWED:      No visits with results within 7 Day(s) from this visit.    Latest known visit with results is:   Admission on 04/10/2021, Discharged on 04/14/2021   Component Date Value    WBC 04/10/2021 13.2*    RBC 04/10/2021 5.09     Hemoglobin 04/10/2021 14.6     Hematocrit 04/10/2021 44.4     MCV 04/10/2021 87.2     MCH 04/10/2021 28.7     MCHC 04/10/2021 32.9*    RDW 04/10/2021 13.2     Platelets 12/49/7071 211     MPV 04/10/2021 9.2*    Neutrophils % 04/10/2021 82.3*    Lymphocytes % 04/10/2021 8.9*    Monocytes % 04/10/2021 7.8     Eosinophils % 04/10/2021 0.1     Basophils % 04/10/2021 0.4     Neutrophils Absolute 04/10/2021 10.9*    Immature Granulocytes # 04/10/2021 0.1     Lymphocytes Absolute 04/10/2021 1.2     Monocytes Absolute 04/10/2021 1.00*    Eosinophils Absolute 04/10/2021 0.00     Basophils Absolute 04/10/2021 0.10     Sodium 04/10/2021 140     Potassium 04/10/2021 4.8     Chloride 04/10/2021 104     CO2 04/10/2021 24     Anion Gap 04/10/2021 12     Glucose 04/10/2021 90     BUN 04/10/2021 20     CREATININE 04/10/2021 0.6     GFR Non- 04/10/2021 >60     GFR  04/10/2021 >59     Calcium 04/10/2021 9.3     Total Protein 04/10/2021 6.3*    Albumin 04/10/2021 3.8     Total Bilirubin 04/10/2021 1.0     Alkaline Phosphatase 04/10/2021 75     ALT 04/10/2021 27     AST 04/10/2021 89*    P-R Interval 04/10/2021 138     QRS Duration 04/10/2021 72     Q-T Interval 04/10/2021 418     QTc Calculation (Bazett) 04/10/2021 437     P Axis 04/10/2021 54     T Axis 04/10/2021 -57     aPTT 04/10/2021 28.2     Protime 04/10/2021 13.3     INR 04/10/2021 1.02     Troponin 04/10/2021 0.34*    Color, UA 04/10/2021 YELLOW     Clarity, UA 04/10/2021 Clear     Glucose, Ur 04/10/2021 Negative     Bilirubin Urine 04/10/2021 Negative     Ketones, Urine 04/10/2021 40*    Specific Lee, UA 04/10/2021 1.026     Blood, Urine 04/10/2021 Negative     pH, UA 04/10/2021 5.0     Protein, UA 04/10/2021 30*    Urobilinogen, Urine 04/10/2021 1.0     Nitrite, Urine 04/10/2021 Negative     Leukocyte Esterase, Urine 04/10/2021 Negative     Total CK 04/10/2021 1,339*    Bacteria, UA 04/10/2021 NEGATIVE*    Crystals, UA 04/10/2021 NEG*    Hyaline Casts, UA 04/10/2021 14*    WBC, UA 04/10/2021 3     RBC, UA 04/10/2021 7*    Epithelial Cells, UA 04/10/2021 1     Pro-BNP 04/10/2021 8,177*    Troponin 04/10/2021 0.20*    P-R Interval 04/10/2021 136     QRS Duration 04/10/2021 72     Q-T Interval 04/10/2021 418     QTc Calculation (Bazett) 04/10/2021 444     P Axis 04/10/2021 58     T Axis 04/10/2021 -71     SARS-CoV-2, NAAT 04/10/2021 Not Detected     Troponin 04/10/2021 0.24*    Troponin 04/10/2021 0.18*    aPTT 04/10/2021 27.9     Sodium 04/13/2021 136     Potassium reflex Magnesi* 04/13/2021 4.9     Chloride 04/13/2021 96*    CO2 04/13/2021 27     Anion Gap 04/13/2021 13     Glucose 04/13/2021 96     BUN 04/13/2021 14     CREATININE 04/13/2021 0.5     GFR Non- 04/13/2021 >60     GFR  04/13/2021 >59     Calcium 04/13/2021 9.8*    WBC 04/13/2021 9.2     RBC 04/13/2021 5.67*    Hemoglobin 04/13/2021 15.9     Hematocrit 04/13/2021 49.2*    MCV 04/13/2021 86.8     MCH 04/13/2021 28.0     MCHC 04/13/2021 32.3*    RDW 04/13/2021 12.7     Platelets 46/50/5010 231     MPV 04/13/2021 9.3*    Neutrophils % 04/13/2021 77.5*    Lymphocytes % 04/13/2021 11.3*    Monocytes % 04/13/2021 9.6     Eosinophils % 04/13/2021 0.7     Basophils % 04/13/2021 0.4     Neutrophils Absolute 04/13/2021 7.1     Immature Granulocytes # 04/13/2021 0.1     Lymphocytes Absolute 04/13/2021 1.0*    Monocytes Absolute 04/13/2021 0.90     Eosinophils Absolute 04/13/2021 0.10     Basophils Absolute 04/13/2021 0.00     Total CK 04/13/2021 286*    Sodium 04/14/2021 137     Potassium reflex Magnesi* 04/14/2021 4.8     Chloride 04/14/2021 99     CO2 04/14/2021 26     Anion Gap 04/14/2021 12     Glucose 04/14/2021 77     BUN 04/14/2021 32*    CREATININE 04/14/2021 1.0*    GFR Non- 04/14/2021 52*    GFR  04/14/2021 >59     Calcium 04/14/2021 9.4     WBC 04/14/2021 8.2     RBC 04/14/2021 5.43*    Hemoglobin 04/14/2021 15.5     Hematocrit 04/14/2021 48.0*    MCV 04/14/2021 88.4     MCH 04/14/2021 28.5     MCHC 04/14/2021 32.3*    RDW 04/14/2021 13.1     Platelets 53/07/2962 254     MPV 04/14/2021 9.7     Neutrophils % 04/14/2021 66.0*    Lymphocytes % 04/14/2021 18.2*    Monocytes % 04/14/2021 12.8*    Eosinophils % 04/14/2021 1.2     Basophils % 04/14/2021 0.6     Neutrophils Absolute 04/14/2021 5.4     Immature Granulocytes # 04/14/2021 0.1     Lymphocytes Absolute 04/14/2021 1.5     Monocytes Absolute 04/14/2021 1.10*    Eosinophils Absolute 04/14/2021 0.10     Basophils Absolute 04/14/2021 0.10         Total time: more than 40 minutes  > 50% counseling / coordination?:  yes        Electronically signed by GISELE Martin CNP on 4/26/2021 at 1:52 PM

## 2021-05-04 ENCOUNTER — OFFICE VISIT (OUTPATIENT)
Dept: PALLATIVE CARE | Age: 86
End: 2021-05-04
Payer: MEDICARE

## 2021-05-04 VITALS
OXYGEN SATURATION: 96 % | DIASTOLIC BLOOD PRESSURE: 76 MMHG | TEMPERATURE: 97.5 F | SYSTOLIC BLOOD PRESSURE: 136 MMHG | RESPIRATION RATE: 20 BRPM | HEART RATE: 76 BPM

## 2021-05-04 VITALS — OXYGEN SATURATION: 98 % | RESPIRATION RATE: 20 BRPM | HEART RATE: 60 BPM | TEMPERATURE: 97.9 F

## 2021-05-04 DIAGNOSIS — Z51.5 ENCOUNTER FOR PALLIATIVE CARE: ICD-10-CM

## 2021-05-04 DIAGNOSIS — I21.4 NSTEMI (NON-ST ELEVATED MYOCARDIAL INFARCTION) (HCC): ICD-10-CM

## 2021-05-04 DIAGNOSIS — R45.1 RESTLESSNESS AND AGITATION: Primary | ICD-10-CM

## 2021-05-04 DIAGNOSIS — F02.818 DEMENTIA ASSOCIATED WITH OTHER UNDERLYING DISEASE WITH BEHAVIORAL DISTURBANCE: ICD-10-CM

## 2021-05-04 PROCEDURE — 99308 SBSQ NF CARE LOW MDM 20: CPT | Performed by: NURSE PRACTITIONER

## 2021-05-04 PROCEDURE — 1123F ACP DISCUSS/DSCN MKR DOCD: CPT | Performed by: NURSE PRACTITIONER

## 2021-05-04 RX ORDER — LORAZEPAM 0.5 MG/1
0.5 TABLET ORAL
COMMUNITY

## 2021-05-04 RX ORDER — DIVALPROEX SODIUM 125 MG/1
125 CAPSULE, COATED PELLETS ORAL 2 TIMES DAILY
COMMUNITY

## 2021-05-04 NOTE — PROGRESS NOTES
[facility]   Follow Up Note      Patient Name:  Roger Scott  Medical Record Number:  [id number]  YOB: 1929    Date of Visit: 5/4/2021  Location of Visit:  []  Home    [x]  Other:   Marya Johnson  (admitted 4/14/2021)    Care Team Members:   Dr. Rui Jain - PCP  Dr. Charlee Garcia Attending    History obtained from:  non-family caregiver - facility staff Concepcion GA, electronic medical record [facility] and [address] [place] [address]:   Lee Chapin was seen today for follow-up. Diagnoses and all orders for this visit:    Restlessness and agitation    Dementia associated with other underlying disease with behavioral disturbance ([facility] [address]    NSTEMI (non-ST elevated myocardial infarction) ([facility] [address]    Encounter for palliative care      RECOMMENDATIONS/PLAN:     -Patient continues to have intermittent agitation/calling out according to staff. Continues on Ativan and Depakote has been added recently by primary team.   -FLACC: 3  Recommend Tylenol 500mg 2 tabs every 8 hours prn pain. -GOC continues to be comfort care only. Consider transition to hospice care. Discussed with sister Haydee Vinicio 5/5/2021. Sister is also waiting to hear back from [facility] regarding possible transfer to that facility as she wants her to have private room. -I will see her again in 1-2 weeks or sooner if needed. CHIEF COMPLAINT:     Chief Complaint   Patient presents with    Follow-up     agitation, GOC       CLINICAL SUMMARY:        Ms. Isaías Velasco is a 80year old female with PMH of dementia, emphysema, HTN, hyperlipidemia, arthritis, BPV, osteoarthritis, CVA, collagenous colitis. Recently admitted to [facility] due to fall. CT head, cervical spine, pelvis and chest xray - no acute findings. She was seen by cardiology due to elevated troponin and was dx with NSTEMI and no further work up was recommended. Palliative care was suggested.   She was seen by orthopedics due to closed (ATIVAN) 0.5 MG tablet, Take 0.5 mg by mouth every 4 hours (while awake). , Disp: , Rfl:     divalproex (DEPAKOTE SPRINKLE) 125 MG capsule, Take 125 mg by mouth 2 times daily, Disp: , Rfl:     aspirin 81 MG chewable tablet, Take 1 tablet by mouth daily, Disp: 30 tablet, Rfl: 0    donepezil (ARICEPT) 5 MG tablet, TAKE 1 TABLET EVERY NIGHT, Disp: 90 tablet, Rfl: 3    hydroCHLOROthiazide (HYDRODIURIL) 25 MG tablet, Take 1 tablet by mouth daily, Disp: 90 tablet, Rfl: 3    Apoaequorin (PREVAGEN) 10 MG CAPS, Take by mouth, Disp: , Rfl:     Multiple Vitamins-Minerals (PRESERVISION AREDS 2 PO), Take by mouth daily, Disp: , Rfl:     metoprolol succinate (TOPROL XL) 25 MG extended release tablet, TAKE 1 TABLET EVERY DAY FOR BLOOD PRESSURE, Disp: 90 tablet, Rfl: 3    losartan (COZAAR) 25 MG tablet, Take 1 tablet by mouth daily, Disp: 90 tablet, Rfl: 3    clopidogrel (PLAVIX) 75 MG tablet, Take 1 tablet by mouth daily, Disp: 90 tablet, Rfl: 3    atorvastatin (LIPITOR) 20 MG tablet, Take one pill daily, Disp: 90 tablet, Rfl: 3    alendronate (FOSAMAX) 70 MG tablet, TAKE 1 TABLET EVERY WEEK, Disp: 12 tablet, Rfl: 3    magnesium hydroxide (MILK OF MAGNESIA) 400 MG/5ML suspension, Take 30 mLs by mouth daily as needed for Constipation, Disp: , Rfl:     diclofenac sodium (VOLTAREN) 1 % GEL, Apply 4 g topically 4 times daily as needed for Pain, Disp: 150 g, Rfl: 3    vitamin D (ERGOCALCIFEROL) 90895 units CAPS capsule, TAKE ONE CAPSULE BY MOUTH ONCE WEEKLY, Disp: 12 capsule, Rfl: 3    furosemide (LASIX) 20 MG tablet, Take 1 tablet by mouth daily as needed (edema), Disp: 30 tablet, Rfl: 1     Allergies:   Other    Review of Systems   Unable to perform ROS: Dementia       OBJECTIVE:     Vitals:    05/04/21 1454   BP: 136/76   Pulse: 76   Resp: 20   Temp: 97.5 °F (36.4 °C)   SpO2: 96%     General appearance: alert and cooperative with exam, vital signs stable, well developed, temporal muscle wasting noted  HEENT: atraumatic,

## 2021-05-05 ENCOUNTER — TELEPHONE (OUTPATIENT)
Dept: INTERNAL MEDICINE | Age: 86
End: 2021-05-05

## 2021-05-10 ENCOUNTER — CARE COORDINATION (OUTPATIENT)
Dept: CARE COORDINATION | Age: 86
End: 2021-05-10

## 2021-05-10 ENCOUNTER — CARE COORDINATION (OUTPATIENT)
Dept: CASE MANAGEMENT | Age: 86
End: 2021-05-10
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
## (undated) DEVICE — TIBURON EXTREMITY SHEET: Brand: CONVERTORS

## (undated) DEVICE — SKIN MARKER,REGULAR TIP WITH RULER: Brand: DEVON

## (undated) DEVICE — GLOVE SURG SZ 8 L11.2IN FNGR THK12.7MIL CUF THK9.7MIL BRN

## (undated) DEVICE — CHLORAPREP 26ML ORANGE

## (undated) DEVICE — AIRLIFE™ NASAL OXYGEN CANNULA CURVED, NONFLARED TIP, WITH 7' FEET (2.1 M) CRUSH RESISTANT TUBING, OVER-THE-EAR STYLE: Brand: AIRLIFE™

## (undated) DEVICE — SUTURE VCRL SZ 3-0 L27IN ABSRB UD L26MM SH 1/2 CIR J416H

## (undated) DEVICE — 9165 UNIVERSAL PATIENT PLATE: Brand: 3M™

## (undated) DEVICE — SUTURE VCRL SZ 3-0 L27IN ABSRB UD FS-2 L19MM 1/2 CIR J423H

## (undated) DEVICE — BANDAGE ESMARK 4IN ST

## (undated) DEVICE — BANDAGE,GAUZE,BULKEE II,4.5"X4.1YD,STRL: Brand: MEDLINE

## (undated) DEVICE — INTENDED FOR TISSUE SEPARATION, AND OTHER PROCEDURES THAT REQUIRE A SHARP SURGICAL BLADE TO PUNCTURE OR CUT.: Brand: BARD-PARKER ® CARBON RIB-BACK BLADES

## (undated) DEVICE — SUTURE ETHLN SZ 4-0 L18IN NONABSORBABLE BLK L19MM PS-2 3/8 1667H

## (undated) DEVICE — GAUZE SPONGES,12 PLY: Brand: CURITY

## (undated) DEVICE — OCCLUSIVE GAUZE STRIP,3% BISMUTH TRIBROMOPHENATE IN PETROLATUM BLEND: Brand: XEROFORM

## (undated) DEVICE — VELCLOSE LATEX FREE VELCRO ELASTIC BANDAGE 4INX5YD: Brand: VELCLOSE

## (undated) DEVICE — STANDARD SURGICAL GOWN, L: Brand: CONVERTORS